# Patient Record
Sex: MALE | URBAN - METROPOLITAN AREA
[De-identification: names, ages, dates, MRNs, and addresses within clinical notes are randomized per-mention and may not be internally consistent; named-entity substitution may affect disease eponyms.]

---

## 2017-07-15 ENCOUNTER — NURSE TRIAGE (OUTPATIENT)
Dept: NURSING | Facility: CLINIC | Age: 42
End: 2017-07-15

## 2017-07-15 NOTE — TELEPHONE ENCOUNTER
"  Reason for Disposition    [1] SEVERE pain (e.g., excruciating) AND [2] present > 1 hour     \"I am calling from [facility]. I am here because I am having severe abdominal pain(in th middle and lower right), They took my vitals but said they are waiting for a room. I haven't left the  because I haven't had to go. But the pain is bad, I am wondering if I should go to [place] [facility]?\" Denies other sx. Patient vomited this am after water and grape fruit juice.  Is urinating normally and having normal bowel movements. I advised to speak with the nurse at [facility] regarding pain. Offered [place] [facility], but explained there may also be a wait time there as well.    Additional Information    Negative: Shock suspected (e.g., cold/pale/clammy skin, too weak to stand, low BP, rapid pulse)    Negative: Difficult to awaken or acting confused  (e.g., disoriented, slurred speech)    Negative: Passed out (i.e., lost consciousness, collapsed and was not responding)    Negative: Sounds like a life-threatening emergency to the triager    Negative: Chest pain    Negative: Pain is mainly in upper abdomen  (if needed ask: \"is it mainly above the belly button?\")    Negative: Followed an abdomen (stomach) injury    Protocols used: ABDOMINAL PAIN - MALE-ADULT-    "

## 2017-09-05 NOTE — PROGRESS NOTES
"  SUBJECTIVE:   Carl Sierra is a 42 year old male who presents to clinic today for the following health issues:    Patient presents with:  Sleep Problem: having trouble while sleeping such as: feelings like heart burn,heart racing    Restlessness:  Restlessness at night with racing heart beat (like ), been stressed a lot lately  Denies any chest pain but has a discomfort. Sometimes occurs after he eats.  The symptoms been going on for 2-3 weeks.    GERD:   Sometimes when eats feels like food gets stuck in the throat.  Denies any heartburn    Elevated BP:   Not had high BP before   Father  Has high BP with DM, Mom has diabetes   Father passed from heart attack at 64.    BMI:   Has lost a lot of weight from about 275 a year ago.    Stress:   Staying in [place] and is supposed to move by end and looking for a place and seems like time is running out..   Been very anxious also about his health.   No history of anxiety or depression.    Problem list and histories reviewed & adjusted, as indicated.  Additional history: as documented    There is no problem list on file for this patient.    History reviewed. No pertinent surgical history.    Social History   Substance Use Topics     Smoking status: Never Smoker     Smokeless tobacco: Never Used     Alcohol use Yes      Comment: OCC     Family History   Problem Relation Age of Onset     DIABETES Mother      DIABETES Father      HEART DISEASE Father          ROS:  Constitutional, HEENT, cardiovascular, pulmonary, gi and gu systems are negative, except as otherwise noted.    OBJECTIVE:   BP (!) 150/100  Pulse 81  Temp 97.5  F (36.4  C) (Oral)  Ht 5' 8.98\" (1.752 m)  Wt 257 lb (116.6 kg)  SpO2 99%  BMI 37.98 kg/m2  Body mass index is 37.98 kg/(m^2).  GENERAL: healthy, anxious appearing, alert and no distress  EYES: Eyes grossly normal to inspection, PERRL and conjunctivae and sclerae normal  NECK: no adenopathy and thyroid normal to palpation  RESP: lungs clear to auscultation " - no rales, rhonchi or wheezes  CV: regular rate and rhythm, no murmur, click or rub, no peripheral edema   ABDOMEN: soft, nontender, no masses and bowel sounds normal  MS: no gross musculoskeletal defects noted, no edema  NEURO: Normal strength and tone, mentation intact and speech normal  PSYCH: mentation appears normal, affect normal/bright    Diagnostic Test Results:        ASSESSMENT/PLAN:     (R45.1) Restlessness  (primary encounter diagnosis)  Comment: Consistent with anxiety. BP also slightly elevated but no cardiac symptoms.   Plan: CBC with platelets differential    (R03.0) Elevated blood pressure reading without diagnosis of hypertension  Comment: Likely high BP. BMP normal. Recheck in 1 week if elevated will start a BP owering pill  Plan: Comprehensive metabolic panel    (Z68.37) BMI 37.0-37.9, adult  Comment: Counseled to make better food choices, exercise as tolerated, and lose weight.   Plan: Lipid panel reflex to direct LDL    (F43.9) Stress  Comment: Anxiety about his health and housing arrangements. Discussed fact that current evaluation is reassuring and fact that can seek community options for accomodation if runs out of time, reaching out to friends and colleagues might alsobe an option    (Z83.3) Family history of diabetes mellitus  Comment: At risk.   Plan: Hemoglobin A1c    Follow up in 1 week or sooner with concerns    Bruce Quiros MD  [facility]

## 2017-09-06 ENCOUNTER — OFFICE VISIT (OUTPATIENT)
Dept: FAMILY MEDICINE | Facility: CLINIC | Age: 42
End: 2017-09-06
Payer: COMMERCIAL

## 2017-09-06 VITALS
WEIGHT: 257 LBS | HEIGHT: 69 IN | BODY MASS INDEX: 38.06 KG/M2 | SYSTOLIC BLOOD PRESSURE: 142 MMHG | TEMPERATURE: 97.5 F | OXYGEN SATURATION: 99 % | HEART RATE: 81 BPM | DIASTOLIC BLOOD PRESSURE: 90 MMHG

## 2017-09-06 DIAGNOSIS — R03.0 ELEVATED BLOOD PRESSURE READING WITHOUT DIAGNOSIS OF HYPERTENSION: ICD-10-CM

## 2017-09-06 DIAGNOSIS — Z23 NEED FOR PROPHYLACTIC VACCINATION WITH TETANUS-DIPHTHERIA (TD): ICD-10-CM

## 2017-09-06 DIAGNOSIS — R45.1 RESTLESSNESS: Primary | ICD-10-CM

## 2017-09-06 DIAGNOSIS — F43.9 STRESS: ICD-10-CM

## 2017-09-06 DIAGNOSIS — Z83.3 FAMILY HISTORY OF DIABETES MELLITUS: ICD-10-CM

## 2017-09-06 LAB
ALBUMIN SERPL-MCNC: 3.8 G/DL (ref 3.4–5)
ALP SERPL-CCNC: 78 U/L (ref 40–150)
ALT SERPL W P-5'-P-CCNC: 36 U/L (ref 0–70)
ANION GAP SERPL CALCULATED.3IONS-SCNC: 6 MMOL/L (ref 3–14)
AST SERPL W P-5'-P-CCNC: 22 U/L (ref 0–45)
BASOPHILS # BLD AUTO: 0 10E9/L (ref 0–0.2)
BASOPHILS NFR BLD AUTO: 0.9 %
BILIRUB SERPL-MCNC: 1.1 MG/DL (ref 0.2–1.3)
BUN SERPL-MCNC: 7 MG/DL (ref 7–30)
CALCIUM SERPL-MCNC: 9.1 MG/DL (ref 8.5–10.1)
CHLORIDE SERPL-SCNC: 102 MMOL/L (ref 94–109)
CHOLEST SERPL-MCNC: 153 MG/DL
CO2 SERPL-SCNC: 28 MMOL/L (ref 20–32)
CREAT SERPL-MCNC: 0.95 MG/DL (ref 0.66–1.25)
DIFFERENTIAL METHOD BLD: ABNORMAL
EOSINOPHIL # BLD AUTO: 0.1 10E9/L (ref 0–0.7)
EOSINOPHIL NFR BLD AUTO: 2.9 %
ERYTHROCYTE [DISTWIDTH] IN BLOOD BY AUTOMATED COUNT: 17.1 % (ref 10–15)
GFR SERPL CREATININE-BSD FRML MDRD: 87 ML/MIN/1.7M2
GLUCOSE SERPL-MCNC: 130 MG/DL (ref 70–99)
HBA1C MFR BLD: 8.3 % (ref 4.3–6)
HCT VFR BLD AUTO: 44.3 % (ref 40–53)
HDLC SERPL-MCNC: 47 MG/DL
HGB BLD-MCNC: 14.9 G/DL (ref 13.3–17.7)
LDLC SERPL CALC-MCNC: 82 MG/DL
LYMPHOCYTES # BLD AUTO: 1.9 10E9/L (ref 0.8–5.3)
LYMPHOCYTES NFR BLD AUTO: 42.4 %
MCH RBC QN AUTO: 27.1 PG (ref 26.5–33)
MCHC RBC AUTO-ENTMCNC: 33.6 G/DL (ref 31.5–36.5)
MCV RBC AUTO: 81 FL (ref 78–100)
MONOCYTES # BLD AUTO: 0.4 10E9/L (ref 0–1.3)
MONOCYTES NFR BLD AUTO: 10 %
NEUTROPHILS # BLD AUTO: 1.9 10E9/L (ref 1.6–8.3)
NEUTROPHILS NFR BLD AUTO: 43.8 %
NONHDLC SERPL-MCNC: 106 MG/DL
PLATELET # BLD AUTO: 199 10E9/L (ref 150–450)
POTASSIUM SERPL-SCNC: 4.1 MMOL/L (ref 3.4–5.3)
PROT SERPL-MCNC: 7.5 G/DL (ref 6.8–8.8)
RBC # BLD AUTO: 5.49 10E12/L (ref 4.4–5.9)
SODIUM SERPL-SCNC: 136 MMOL/L (ref 133–144)
TRIGL SERPL-MCNC: 118 MG/DL
WBC # BLD AUTO: 4.4 10E9/L (ref 4–11)

## 2017-09-06 PROCEDURE — 99203 OFFICE O/P NEW LOW 30 MIN: CPT | Performed by: FAMILY MEDICINE

## 2017-09-06 PROCEDURE — 85025 COMPLETE CBC W/AUTO DIFF WBC: CPT | Performed by: FAMILY MEDICINE

## 2017-09-06 PROCEDURE — 83036 HEMOGLOBIN GLYCOSYLATED A1C: CPT | Performed by: FAMILY MEDICINE

## 2017-09-06 PROCEDURE — 36415 COLL VENOUS BLD VENIPUNCTURE: CPT | Performed by: FAMILY MEDICINE

## 2017-09-06 PROCEDURE — 80053 COMPREHEN METABOLIC PANEL: CPT | Performed by: FAMILY MEDICINE

## 2017-09-06 PROCEDURE — 80061 LIPID PANEL: CPT | Performed by: FAMILY MEDICINE

## 2017-09-06 RX ORDER — FLUTICASONE PROPIONATE 50 MCG
1 SPRAY, SUSPENSION (ML) NASAL DAILY PRN
COMMUNITY

## 2017-09-06 NOTE — NURSING NOTE
"Chief Complaint   Patient presents with     Sleep Problem     having trouble while sleeping such as: feelings like heart burn,heart racing       Initial BP (!) 150/100  Pulse 81  Temp 97.5  F (36.4  C) (Oral)  Ht 5' 8.98\" (1.752 m)  Wt 257 lb (116.6 kg)  SpO2 99%  BMI 37.98 kg/m2 Estimated body mass index is 37.98 kg/(m^2) as calculated from the following:    Height as of this encounter: 5' 8.98\" (1.752 m).    Weight as of this encounter: 257 lb (116.6 kg).  Medication Reconciliation: complete  An TY eZlaya      "

## 2017-09-06 NOTE — LETTER
[facility]  [address]  [place] [address]    September 7, 2017    Carl Sierra  [address] [address]          Dear Pasquale Henry is a copy of your results. Discussed results with patient and will schedule appointment to discuss diabetes.   Results for orders placed or performed in visit on 09/06/17   Lipid panel reflex to direct LDL   Result Value Ref Range    Cholesterol 153 <200 mg/dL    Triglycerides 118 <150 mg/dL    HDL Cholesterol 47 >39 mg/dL    LDL Cholesterol Calculated 82 <100 mg/dL    Non HDL Cholesterol 106 <130 mg/dL   Hemoglobin A1c   Result Value Ref Range    Hemoglobin A1C 8.3 (H) 4.3 - 6.0 %   Comprehensive metabolic panel   Result Value Ref Range    Sodium 136 133 - 144 mmol/L    Potassium 4.1 3.4 - 5.3 mmol/L    Chloride 102 94 - 109 mmol/L    Carbon Dioxide 28 20 - 32 mmol/L    Anion Gap 6 3 - 14 mmol/L    Glucose 130 (H) 70 - 99 mg/dL    Urea Nitrogen 7 7 - 30 mg/dL    Creatinine 0.95 0.66 - 1.25 mg/dL    GFR Estimate 87 >60 mL/min/1.7m2    GFR Estimate If Black >90 >60 mL/min/1.7m2    Calcium 9.1 8.5 - 10.1 mg/dL    Bilirubin Total 1.1 0.2 - 1.3 mg/dL    Albumin 3.8 3.4 - 5.0 g/dL    Protein Total 7.5 6.8 - 8.8 g/dL    Alkaline Phosphatase 78 40 - 150 U/L    ALT 36 0 - 70 U/L    AST 22 0 - 45 U/L   CBC with platelets differential   Result Value Ref Range    WBC 4.4 4.0 - 11.0 10e9/L    RBC Count 5.49 4.4 - 5.9 10e12/L    Hemoglobin 14.9 13.3 - 17.7 g/dL    Hematocrit 44.3 40.0 - 53.0 %    MCV 81 78 - 100 fl    MCH 27.1 26.5 - 33.0 pg    MCHC 33.6 31.5 - 36.5 g/dL    RDW 17.1 (H) 10.0 - 15.0 %    Platelet Count 199 150 - 450 10e9/L    Diff Method Automated Method     % Neutrophils 43.8 %    % Lymphocytes 42.4 %    % Monocytes 10.0 %    % Eosinophils 2.9 %    % Basophils 0.9 %    Absolute Neutrophil 1.9 1.6 - 8.3 10e9/L    Absolute Lymphocytes 1.9 0.8 - 5.3 10e9/L    Absolute Monocytes 0.4 0.0 - 1.3  10e9/L    Absolute Eosinophils 0.1 0.0 - 0.7 10e9/L    Absolute Basophils 0.0 0.0 - 0.2 10e9/L       If you have any questions or concerns, please me or my clinic team at [phone].      Sincerely,        Bruce Quiros MD/bt

## 2017-09-06 NOTE — PATIENT INSTRUCTIONS
[facility]    If you have any questions regarding to your visit please contact your care team:       Team Purple:   Clinic Hours Telephone Number   Dr. Ruma Burns     7am-7pm  Monday - Thursday   7am-5pm  Fridays  [phone]  (Appointment scheduling available 24/7)    Questions about your Visit?   Team Line:  [phone]   Urgent Care - [place] and [facility] - 11am-9pm Monday-Friday Saturday-Sunday- 9am-5pm   [place] - 5pm-9pm Monday-Friday Saturday-Sunday- 9am-5pm  [phone] - [facility]  [phone] - [place]       What options do I have for visits at the clinic other than the traditional office visit?  To expand how we care for you, many of our providers are utilizing electronic visits (e-visits) and telephone visits, when medically appropriate, for interactions with their patients rather than a visit in the clinic.   We also offer nurse visits for many medical concerns. Just like any other service, we will bill your insurance company for this type of visit based on time spent on the phone with your provider. Not all insurance companies cover these visits. Please check with your medical insurance if this type of visit is covered. You will be responsible for any charges that are not paid by your insurance.      E-visits via [employer]:  generally incur a $35.00 fee.  Telephone visits:  Time spent on the phone: *charged based on time that is spent on the phone in increments of 10 minutes. Estimated cost:   5-10 mins $30.00   11-20 mins. $59.00   21-30 mins. $85.00     Use SunLinkt (secure email communication and access to your chart) to send your primary care provider a message or make an appointment. Ask someone on your Team how to sign up for [employer].  For a Price Quote for your services, please call our Consumer Price Line at [phone].  As always, Thank you for trusting us with your health care needs!    Discharged By: An

## 2018-12-08 NOTE — MR AVS SNAPSHOT
After Visit Summary   9/6/2017    Carl Sierra    MRN: [id number]           Patient Information     Date Of Birth          1975        Visit Information        Provider Department      9/6/2017 9:20 AM Bruce Quiros MD [facility]        Today's Diagnoses     Restlessness    -  1    Elevated blood pressure reading without diagnosis of hypertension        BMI 37.0-37.9, adult        Stress        Family history of diabetes mellitus        Need for prophylactic vaccination with tetanus-diphtheria (TD)          Care Instructions    [facility]    If you have any questions regarding to your visit please contact your care team:       Team Purple:   Clinic Hours Telephone Number   Dr. Ruma Burns     7am-7pm  Monday - Thursday   7am-5pm  Fridays  [phone]  (Appointment scheduling available 24/7)    Questions about your Visit?   Team Line:  [phone]   [facility] [place] [facility] - 11am-9pm Monday-Friday Saturday-Sunday- 9am-5pm   [place] - 5pm-9pm Monday-Friday Saturday-Sunday- 9am-5pm  [phone] - [place]   [phone] - [place]       What options do I have for visits at the clinic other than the traditional office visit?  To expand how we care for you, many of our providers are utilizing electronic visits (e-visits) and telephone visits, when medically appropriate, for interactions with their patients rather than a visit in the clinic.   We also offer nurse visits for many medical concerns. Just like any other service, we will bill your insurance company for this type of visit based on time spent on the phone with your provider. Not all insurance companies cover these visits. Please check with your medical insurance if this type of visit is covered. You will be responsible for any charges that are not paid by your insurance.      E-visits via [employer]:  generally incur a $35.00  "fee.  Telephone visits:  Time spent on the phone: *charged based on time that is spent on the phone in increments of 10 minutes. Estimated cost:   5-10 mins $30.00   11-20 mins. $59.00   21-30 mins. $85.00     Use [employer] AShart (secure email communication and access to your chart) to send your primary care provider a message or make an appointment. Ask someone on your Team how to sign up for [employer]elli[employer].  For a Price Quote for your services, please call our [employer] Line at [phone].  As always, Thank you for trusting us with your health care needs!    Discharged By: An            Follow-ups after your visit        Who to contact     If you have questions or need follow up information about today's clinic visit or your schedule please contact [facility] directly at [phone].  Normal or non-critical lab and imaging results will be communicated to you by MyChart, letter or phone within 4 business days after the clinic has received the results. If you do not hear from us within 7 days, please contact the clinic through [employer] or phone. If you have a critical or abnormal lab result, we will notify you by phone as soon as possible.  Submit refill requests through [employer] or call your pharmacy and they will forward the refill request to us. Please allow 3 business days for your refill to be completed.          Additional Information About Your Visit        Sekal AShart Information     IntelliFlot lets you send messages to your doctor, view your test results, renew your prescriptions, schedule appointments and more. To sign up, go to [email or web address] . Click on \"Log in\" on the left side of the screen, which will take you to the Welcome page. Then click on \"Sign up Now\" on the right side of the page.     You will be asked to enter the access code listed below, as well as some personal information. Please follow the directions to create your username and password.     Your access code is: " "[email or web address]  Expires: 2017 10:25 AM     Your access code will  in 90 days. If you need help or a new code, please call your [place] clinic or [phone].        Care EveryWhere ID     This is your Care EveryWhere ID. This could be used by other organizations to access your [place] medical records  [id number]        Your Vitals Were     Pulse Temperature Height Pulse Oximetry BMI (Body Mass Index)       81 97.5  F (36.4  C) (Oral) 5' 8.98\" (1.752 m) 99% 37.98 kg/m2        Blood Pressure from Last 3 Encounters:   17 (!) 150/100    Weight from Last 3 Encounters:   17 257 lb (116.6 kg)              We Performed the Following     CBC with platelets differential     Comprehensive metabolic panel     Hemoglobin A1c     Lipid panel reflex to direct LDL        Primary Care Provider    None Specified       No primary provider on file.        Equal Access to Services     SHANIQUE YEH : Hadii nydia Pope, waaxda luarnulfo, qaybta kaalmada adestephany, sue lau . So [facility] [phone].    ATENCIÓN: Si habla español, tiene a dangelo disposición servicios gratuitos de asistencia lingüística. Nicole al [phone].    We comply with applicable federal civil rights laws and [place] laws. We do not discriminate on the basis of race, color, national origin, age, disability sex, sexual orientation or gender identity.            Thank you!     Thank you for choosing [facility]  for your care. Our goal is always to provide you with excellent care. Hearing back from our patients is one way we can continue to improve our services. Please take a few minutes to complete the written survey that you may receive in the mail after your visit with us. Thank you!             Your Updated Medication List - Protect others around you: Learn how to safely use, store and throw away your medicines at www.disposemymeds.org.          This list is accurate as of: 17 10:25 " AM.  Always use your most recent med list.                   Brand Name Dispense Instructions for use Diagnosis    fluticasone 50 MCG/ACT spray    FLONASE     Spray 1 spray into both nostrils daily as needed for rhinitis or allergies           negative

## 2021-04-08 ENCOUNTER — IMMUNIZATION (OUTPATIENT)
Dept: FAMILY MEDICINE | Facility: CLINIC | Age: 46
End: 2021-04-08
Payer: COMMERCIAL

## 2021-04-08 PROCEDURE — 0011A PR COVID VAC MODERNA 100 MCG/0.5 ML IM: CPT

## 2021-04-08 PROCEDURE — 91301 PR COVID VAC MODERNA 100 MCG/0.5 ML IM: CPT

## 2021-05-06 ENCOUNTER — IMMUNIZATION (OUTPATIENT)
Dept: FAMILY MEDICINE | Facility: CLINIC | Age: 46
End: 2021-05-06
Attending: FAMILY MEDICINE
Payer: COMMERCIAL

## 2021-05-06 PROCEDURE — 0012A PR COVID VAC MODERNA 100 MCG/0.5 ML IM: CPT

## 2021-05-06 PROCEDURE — 91301 PR COVID VAC MODERNA 100 MCG/0.5 ML IM: CPT

## 2024-11-04 ENCOUNTER — HOSPITAL ENCOUNTER (INPATIENT)
Facility: CLINIC | Age: 49
DRG: 057 | End: 2024-11-04
Attending: PHYSICAL MEDICINE & REHABILITATION | Admitting: PHYSICAL MEDICINE & REHABILITATION
Payer: COMMERCIAL

## 2024-11-04 DIAGNOSIS — I63.511 ACUTE ISCHEMIC RIGHT MCA STROKE (H): Primary | ICD-10-CM

## 2024-11-04 LAB
GLUCOSE BLDC GLUCOMTR-MCNC: 124 MG/DL (ref 70–99)
GLUCOSE BLDC GLUCOMTR-MCNC: 128 MG/DL (ref 70–99)

## 2024-11-04 PROCEDURE — 250N000012 HC RX MED GY IP 250 OP 636 PS 637

## 2024-11-04 PROCEDURE — 128N000003 HC R&B REHAB

## 2024-11-04 PROCEDURE — 99222 1ST HOSP IP/OBS MODERATE 55: CPT | Mod: AI | Performed by: PHYSICAL MEDICINE & REHABILITATION

## 2024-11-04 PROCEDURE — 250N000013 HC RX MED GY IP 250 OP 250 PS 637

## 2024-11-04 PROCEDURE — 250N000013 HC RX MED GY IP 250 OP 250 PS 637: Performed by: PHYSICAL MEDICINE & REHABILITATION

## 2024-11-04 RX ORDER — AMLODIPINE BESYLATE 10 MG/1
10 TABLET ORAL DAILY
Status: DISCONTINUED | OUTPATIENT
Start: 2024-11-05 | End: 2024-11-22 | Stop reason: HOSPADM

## 2024-11-04 RX ORDER — LIDOCAINE 4 G/G
1 PATCH TOPICAL
Status: DISCONTINUED | OUTPATIENT
Start: 2024-11-04 | End: 2024-11-04

## 2024-11-04 RX ORDER — NALOXONE HYDROCHLORIDE 0.4 MG/ML
0.4 INJECTION, SOLUTION INTRAMUSCULAR; INTRAVENOUS; SUBCUTANEOUS
Status: DISCONTINUED | OUTPATIENT
Start: 2024-11-04 | End: 2024-11-04

## 2024-11-04 RX ORDER — ASPIRIN 81 MG/1
81 TABLET, CHEWABLE ORAL DAILY
Status: DISCONTINUED | OUTPATIENT
Start: 2024-11-05 | End: 2024-11-22 | Stop reason: HOSPADM

## 2024-11-04 RX ORDER — FAMOTIDINE 20 MG/1
20 TABLET, FILM COATED ORAL 2 TIMES DAILY
Status: DISCONTINUED | OUTPATIENT
Start: 2024-11-04 | End: 2024-11-11

## 2024-11-04 RX ORDER — POLYETHYLENE GLYCOL 3350 17 G/17G
17 POWDER, FOR SOLUTION ORAL DAILY
Status: DISCONTINUED | OUTPATIENT
Start: 2024-11-04 | End: 2024-11-15

## 2024-11-04 RX ORDER — LOSARTAN POTASSIUM 50 MG/1
50 TABLET ORAL 2 TIMES DAILY
Status: DISCONTINUED | OUTPATIENT
Start: 2024-11-04 | End: 2024-11-22 | Stop reason: HOSPADM

## 2024-11-04 RX ORDER — BISACODYL 10 MG
10 SUPPOSITORY, RECTAL RECTAL DAILY PRN
Status: DISCONTINUED | OUTPATIENT
Start: 2024-11-04 | End: 2024-11-06

## 2024-11-04 RX ORDER — DEXTROSE MONOHYDRATE 25 G/50ML
25-50 INJECTION, SOLUTION INTRAVENOUS
Status: DISCONTINUED | OUTPATIENT
Start: 2024-11-04 | End: 2024-11-22 | Stop reason: HOSPADM

## 2024-11-04 RX ORDER — NICOTINE POLACRILEX 4 MG
15-30 LOZENGE BUCCAL
Status: DISCONTINUED | OUTPATIENT
Start: 2024-11-04 | End: 2024-11-22 | Stop reason: HOSPADM

## 2024-11-04 RX ORDER — ATORVASTATIN CALCIUM 40 MG/1
40 TABLET, FILM COATED ORAL EVERY EVENING
Status: DISCONTINUED | OUTPATIENT
Start: 2024-11-04 | End: 2024-11-22 | Stop reason: HOSPADM

## 2024-11-04 RX ORDER — METOPROLOL TARTRATE 50 MG
50 TABLET ORAL 2 TIMES DAILY
Status: DISCONTINUED | OUTPATIENT
Start: 2024-11-04 | End: 2024-11-22 | Stop reason: HOSPADM

## 2024-11-04 RX ORDER — NALOXONE HYDROCHLORIDE 0.4 MG/ML
0.2 INJECTION, SOLUTION INTRAMUSCULAR; INTRAVENOUS; SUBCUTANEOUS
Status: DISCONTINUED | OUTPATIENT
Start: 2024-11-04 | End: 2024-11-04

## 2024-11-04 RX ORDER — OXYCODONE HYDROCHLORIDE 5 MG/1
5 TABLET ORAL EVERY 6 HOURS PRN
Status: DISCONTINUED | OUTPATIENT
Start: 2024-11-04 | End: 2024-11-04

## 2024-11-04 RX ORDER — ACETAMINOPHEN 325 MG/1
650 TABLET ORAL EVERY 4 HOURS PRN
Status: DISCONTINUED | OUTPATIENT
Start: 2024-11-04 | End: 2024-11-22 | Stop reason: HOSPADM

## 2024-11-04 RX ADMIN — ACETAMINOPHEN 650 MG: 325 TABLET, FILM COATED ORAL at 17:46

## 2024-11-04 RX ADMIN — METOPROLOL TARTRATE 50 MG: 50 TABLET, FILM COATED ORAL at 20:29

## 2024-11-04 RX ADMIN — FAMOTIDINE 20 MG: 20 TABLET ORAL at 20:30

## 2024-11-04 RX ADMIN — INSULIN GLARGINE 7 UNITS: 100 INJECTION, SOLUTION SUBCUTANEOUS at 21:21

## 2024-11-04 RX ADMIN — Medication 3 MG: at 20:30

## 2024-11-04 RX ADMIN — POLYETHYLENE GLYCOL 3350 17 G: 17 POWDER, FOR SOLUTION ORAL at 17:40

## 2024-11-04 RX ADMIN — ATORVASTATIN CALCIUM 40 MG: 40 TABLET, FILM COATED ORAL at 20:30

## 2024-11-04 RX ADMIN — LOSARTAN POTASSIUM 50 MG: 50 TABLET, FILM COATED ORAL at 20:30

## 2024-11-04 RX ADMIN — ACETAMINOPHEN 650 MG: 325 TABLET, FILM COATED ORAL at 21:20

## 2024-11-04 ASSESSMENT — ACTIVITIES OF DAILY LIVING (ADL)
ADLS_ACUITY_SCORE: 0

## 2024-11-04 NOTE — H&P
[facility]   Acute Rehabilitation Unit  Admission History and Physical    CHIEF COMPLAINT   stroke     HISTORY OF PRESENT ILLNESS  Carl Sierra is a 49 year old left hand dominant male with past medical history of untreated/undiagnosed obstructive sleep apnea and hypertension who was initially admitted on October 2nd with hypertensive emergency, left upper extremity numbness and weakness.  He was found to have right MCA ischemic stroke and NORMA.  He received TNK on October 2 with no significant improvement of his symptoms.  Brain MRI also showed small right posterior cerebellar stroke and multiple old infarcts.    His hospital course was complicated by progression of his symptoms due to worsening cerebral edema and midline shift requiring right decompressive craniectomy on October 5.  He was also diagnosed by DM II, HLD, nonischemic cardiomyopathy and heart failure with reserved ejection fraction, oral candidiasis, and strep group B and H. Influenza infection (received 5 days course of amoxicillin 10/11-16)  and depression.    Repeat head CT on 10/29 showed revolving infarction, unchanged degree of edema and herniation and some petechial hemorrhage which was also grossly unchanged.     Unfortunately his left-sided weakness has been persistent. Staples were removed from his surgical incision site and he has to use helmet when he is out of bed.  He has had some episodes of waxing and waning of his mental status and headaches.  It seems like his mental status and headaches have both improved with Tylenol and other medical management.  He required NG tube placement and tube feeding early after his surgery but that was removed and he has been able to eat well.  He had hyponatremia which resolved.  Neurosurgery team is planning for a cranioplasty in about 4 to 6 weeks.  Cardiology team also recommended repeat echocardiogram in a month and follow-up in clinic.    During acute  hospitalization, patient was seen and evaluated by PT, OT, and SLP who collectively recommended that patient would benefit from ongoing therapies in the acute inpatient rehabilitation setting, and patient was admitted on 11/4/2024.     Per therapy notes, he has been able to follow one-step commands.  He requires max assist of 2 and lift for transfers and also A of 2 for all mobility and ADLs.  He is on a regular diet with thin liquids but needs assistance with eating.  He has been constipated and receiving bowel meds.  They have been using primofit all day and all night.  He has left-sided inattention, impaired cognition and language which needs further evaluation during his rehab stay.    Upon arrival to the rehab unit, patient was seen at the bedside.  His partner Renea and mother Mya were both available and very supportive.  He reported intermittent headaches, some left knee pain and some tightness and muscle spasms in his neck area and right trap.  He feels the urge for voiding but has been using primofit as above.  He also noted that he has been constipated and his last bowel movement was about 2 days ago.  He also reported left-sided weakness and some numbness.  Appetite has been low. Family and really both confirmed changes in his cognition and language.    PAST MEDICAL HISTORY  Hypertension  Hyperlipidemia  GLENN  Diabetes mellitus.       SOCIAL HISTORY  He lives in a townhouse with his partner Renea.  They have a very small step (threshold) to enter and about 16 to go up stairs where bedrooms and shower are located.  They have a bathroom on the main floor but no shower.    He works full-time as an  at the [employer].    Per chart review: Carl Sierra  reports that he has never smoked. He has never used smokeless tobacco. He reports current alcohol use. He reports that he does not use drugs.    PRIOR FUNCTIONAL HISTORY   Pt was independent with all ADLs/IADLs, transfers, mobility  and gait with no assistive device.    Left hand dominant  Drives      Social History     Socioeconomic History    Marital status: Single     Spouse name: Not on file    Number of children: Not on file    Years of education: Not on file    Highest education level: Not on file   Occupational History    Not on file   Tobacco Use    Smoking status: Never    Smokeless tobacco: Never   Substance and Sexual Activity    Alcohol use: Yes     Comment: OCC    Drug use: No    Sexual activity: Not Currently   Other Topics Concern    Parent/sibling w/ CABG, MI or angioplasty before 65F 55M? Not Asked   Social History Narrative    Not on file     Social Drivers of Health     Financial Resource Strain: Low Risk  (11/4/2024)    Financial Resource Strain     Within the past 12 months, have you or your family members you live with been unable to get utilities (heat, electricity) when it was really needed?: No   Food Insecurity: Low Risk  (11/4/2024)    Food Insecurity     Within the past 12 months, did you worry that your food would run out before you got money to buy more?: No     Within the past 12 months, did the food you bought just not last and you didn t have money to get more?: No   Transportation Needs: Low Risk  (11/4/2024)    Transportation Needs     Within the past 12 months, has lack of transportation kept you from medical appointments, getting your medicines, non-medical meetings or appointments, work, or from getting things that you need?: No   Physical Activity: Not on file   Stress: Not on file   Social Connections: Not on file   Interpersonal Safety: Low Risk  (11/4/2024)    Interpersonal Safety     Do you feel physically and emotionally safe where you currently live?: Yes     Within the past 12 months, have you been hit, slapped, kicked or otherwise physically hurt by someone?: No     Within the past 12 months, have you been humiliated or emotionally abused in other ways by your partner or ex-partner?: No   Housing  "Stability: Low Risk  (11/4/2024)    Housing Stability     Do you have housing? : Yes     Are you worried about losing your housing?: No         FAMILY HISTORY  Reviewed and updated in Epic.  Family History   Problem Relation Age of Onset    Diabetes Mother     Diabetes Father     Heart Disease Father        MEDICATIONS  Scheduled meds  Medications Prior to Admission   Medication Sig Dispense Refill Last Dose/Taking    fluticasone (FLONASE) 50 MCG/ACT spray Spray 1 spray into both nostrils daily as needed for rhinitis or allergies   Unknown       ALLERGIES   No Known Allergies    REVIEW OF SYSTEMS  A 10 point ROS was performed and negative unless otherwise noted in HPI.     PHYSICAL EXAM  VITAL SIGNS:  BP (!) 140/90 (BP Location: Left arm)   Pulse 79   Temp 98  F (36.7  C) (Oral)   Resp 20   Ht 1.753 m (5' 9\")   Wt 102 kg (224 lb 13.9 oz)   SpO2 100%   BMI 33.21 kg/m    BMI:  Estimated body mass index is 33.21 kg/m  as calculated from the following:    Height as of this encounter: 1.753 m (5' 9\").    Weight as of this encounter: 102 kg (224 lb 13.9 oz).     Gen: No acute distress, cooperative.  HEENT: Right cranial incision healing well.  Noted the swelling/asymmetry of his skull on the right side.  Moist mucous membrane  CV: Regular rate, rhythm, no murmurs.  Respiratory: CTAB, no wheezing, crackles or rhonchi. Breathing comfortably on room air.  Abd: Bowel sounds present. Soft, non-distended, non-tender to palpation in four quadrants.  Extremities: Calves warm, soft, non-tender bilaterally.    Neuromuscular: Speech fluent & comprehensible.    Cranial Nerves: Left facial droop, left weak shoulder shrug, he could not follow the instructions for extraocular movement testing.  He also noted to have some nystagmus when looking to the left.  It was not clear if he had left homonymous hemianopsia or just an attention to the left side.   Sensory: Diminished to light touch at left upper and left lower " extremity   Motor: Intact at right upper and right lower extremity.  No volitional movement at left upper or left lower extremity but he had some activation of his pec and proximal shoulder muscles when positioned antigravity.  He also had some resistance with synergistic movement of muscles in his left lower extremity and closed kinetic chain pattern   Harkins's test: negative bilaterally    Babinski reflex: normal downgoing bilaterally    Tone per Modified Cindy Scale: He was mostly flaccid at left upper and lower extremity but noted a slight increase at the end of the range for elbow flexor and knee flexor muscles 1 out of 4 per modified Cindy score.  He also had a few beats of clonus at left wrist and left ankle      LABS  Reviewed in Care Everywhere    IMPRESSION/PLAN:  Carl Sierra is a 49 year old left hand dominant male with past medical history of HTN and undiagnosed GLENN, who initially presented to October 2 with hypertensive emergency, left upper extremity weakness and numbness due to right MCA ischemic stroke. He underwent right craniectomy on October 5 for the management of cerebral edema and midline shift. Hospital course was complicated but he clinically improved and was transferred to ARU.     Admission to acute inpatient rehab: 11/4/2024   Impairment group code: 01.1 Stroke     PT, OT and SLP 60 minutes of each on a daily basis, 6x a week, for 25 days,  in addition to rehab nursing and close management of physiatrist.     Impairment of ADL's:  OT for 60 min daily, 6x a week for 25 days, to work on upper and lower body self care, dressing, toileting, bathing, energy conservation techniques with use of ADs as needed   Impairment of mobility:   PT for 60 min daily, 6x a week for 25 days, to work on strengthening, endurance buildup, transfers and most likely WC mobility.   Impairment of cognition/language/swallow:   SLP for 60 min daily, 6x a week  for cognitive evaluation and treatment strategies  for higher level cognitive deficits and memory impairment   Rehab RN to administer medication, patient education on medication taking, VS monitoring, bowel regimen, glucose monitoring and wound care/surgical wound dressing changes and monitoring.     Medical Conditions  # Right MCA ischemic stroke s/p TNK  # Cerebral edema s/p decompressive craniectomy 10/5  Brain MRI also showed  1. Relatively large acute infarct in the right middle cerebral artery territory involving the insular cortex as well as portions of the frontal and parietal cortex including the pre and postcentral gyri. Additional smaller acute infarcts are also present along the cortex elsewhere in the right middle cerebral artery territory. A few small acute infarcts are present in the posterior medial right cerebellum and a few punctate acute infarcts are present in the left anterior cerebral artery territory. There is no hemorrhage or mass effect.   2. Chronic infarcts in the left brachium pontis and left cerebral peduncle.   CT angio showed  Short segment severe stenosis of the posterior branch of the M2 segment of the right MCA.   1 cm long severe irregular stenosis of the A3 segment of the right anterior cerebral artery.   CT head was done on 10/18, which was concerning for hemorrhagic petechiae in right MCA territory with evolution of stroke. Repeat CT head ordered on 10/19, unchanged compared to previous imaging.     Completed 7 day course of [facility]   Discharging team and neurosurgery  recommended low dose caffeine or modafinil to help with alertness if needed    - continue ASA and plavix  - consider modafinil   - continue acetaminophen-caffeine (Excedrin Tension Headache) 500-65 mg tablet daily (was started on 10/25)    - stroke secondary prevention as below  - surgical wound healing well; staples were removed 10/21  - helmet when OOB  - follow up with neurosurgery for bone flap replacement - [facility]  (Would be direct admission  day prior for lumbar drain placement and bone flap replacement the following day.)  - follow up stroke neurology and PM&R as outpatient        # HTN  Not managed prior to admission    - continue metoprolol 50 bid   - continue losartan 50 bid (was dosed bid for better control of morning elevation in BP and can be changed to daily dosing)  - amlodipine 10 daily       # HLD  LDL 98 on 10/3.   - continue lipitor 40  - repeat lipid panel in 3 months       # DM II  HgbA1c was 9.2 10/3  - DM education   - continue lantus 7 units bid   - continue low dose sliding scale insulin   - BG check QID  - discontinued 2am check for now   - will add an oral agent in 1-2 days and adjust insulin dos accordingly   - consider endo consult pending his course   - repeat HgbA1C in 3 months       # NICM/HFrEF  EF 40 to 45% with global hypokinesis but no intracardiac shunt   - repeat TTE and follow up with cardiology in a month       # Probable undiagnosed GLENN   Family reported episodes of apnea during the night.   - continue prn O2  - formal sleep study as outpatient        # Hyponatremia   Resolved per discharging team; it was 135 and slightly low on 11/4  Will continue to monitor       # Anemia   Mild and stable, likely due to chronic disease vs CHAD.   Will continue to monitor       # Headaches  # Neck pain   # L knee pain   Headaches seem to be well controlled on tylenol. Typically starts towards the end of the day and also responded to lying flat and hydration.   Was using lidocaine patch and bengay for neck pain as well as prn norco. Pain seems myofascial in nature with no clinical s/s of radiculopathy.   L knee pain for 20 years due to sports injury and likely early OA.   - continue tylenol  - was also on norco 0.5-1 tablet q4h prn since 10/29; was discontinued at discharge.   - reported no benefits from lidocaine so it was discontinued  - added topical bengay cream   - can consider TENS for the myofascial pain       # Insomnia    Seroquel made him agitated and was not beneficial so it was discontinued at discharge and per sign out from discharging team (last dose 11/2)  - continue melatonin   - consider ramelteon       # Obesity  BMI 33.86  - nutrition consult       # Depression  # Adjustment to disability  Worsening mood in the setting of his stroke and disability.   - rehab psych consult   - spiritual health consult   - can consider selective serotonin reuptake inhibitor vs SNRI       # Neurogenic bowel  - PRN and scheduled bowel meds available  - consider starting bowel program with scheduled supp      # Neurogenic bladder:   - ok to use primofit  - will check PVRs  - will start using urinal during the day and possibly toileting using the commode pending his course       FEN: regular with thin. Continued B1 supplement.   DVT Prophylaxis: on dual antiplatelet but no chemical DVT prophylaxis. Will discuss with patient consider adding lovenox (per neurosurgery team note on 10/30 - Okay for daily lovenox if needed). Low threshold for checking US pending his clinical course   GI Prophylaxis: none; added protonix upon admission. On pepcid bid which can discontinued pending his symptoms.    Code: Full - confirmed upon admission   Disposition: he will WC based and will require assistance with all ADLs and mobility; home is not accessible. Will need as ramp and possibly chair lift for stairs.   ELOS/Discharge Date:  4 weeks and most likely TCU after that before discharge to home.  Rehab prognosis:  Good   Follow up Appointments on Discharge:   Outpatient PCP in 1-2 weeks.   Outpatient PM&R in 1-2 months.  Stroke neurology   Neurosurgery    Cardiology   Sleep medicine   Psychology         Yessica Villalobos MD  Physical Medicine & Rehabilitation

## 2024-11-05 ENCOUNTER — APPOINTMENT (OUTPATIENT)
Dept: PHYSICAL THERAPY | Facility: CLINIC | Age: 49
DRG: 057 | End: 2024-11-05
Attending: PHYSICAL MEDICINE & REHABILITATION
Payer: COMMERCIAL

## 2024-11-05 ENCOUNTER — APPOINTMENT (OUTPATIENT)
Dept: OCCUPATIONAL THERAPY | Facility: CLINIC | Age: 49
DRG: 057 | End: 2024-11-05
Payer: COMMERCIAL

## 2024-11-05 ENCOUNTER — APPOINTMENT (OUTPATIENT)
Dept: SPEECH THERAPY | Facility: CLINIC | Age: 49
DRG: 057 | End: 2024-11-05
Attending: PHYSICAL MEDICINE & REHABILITATION
Payer: COMMERCIAL

## 2024-11-05 LAB
ANION GAP SERPL CALCULATED.3IONS-SCNC: 9 MMOL/L (ref 7–15)
BUN SERPL-MCNC: 5.4 MG/DL (ref 6–20)
CALCIUM SERPL-MCNC: 9.3 MG/DL (ref 8.8–10.4)
CHLORIDE SERPL-SCNC: 102 MMOL/L (ref 98–107)
CREAT SERPL-MCNC: 0.69 MG/DL (ref 0.67–1.17)
EGFRCR SERPLBLD CKD-EPI 2021: >90 ML/MIN/1.73M2
ERYTHROCYTE [DISTWIDTH] IN BLOOD BY AUTOMATED COUNT: 15.6 % (ref 10–15)
GLUCOSE BLDC GLUCOMTR-MCNC: 104 MG/DL (ref 70–99)
GLUCOSE BLDC GLUCOMTR-MCNC: 105 MG/DL (ref 70–99)
GLUCOSE BLDC GLUCOMTR-MCNC: 120 MG/DL (ref 70–99)
GLUCOSE BLDC GLUCOMTR-MCNC: 99 MG/DL (ref 70–99)
GLUCOSE SERPL-MCNC: 102 MG/DL (ref 70–99)
HCO3 SERPL-SCNC: 24 MMOL/L (ref 22–29)
HCT VFR BLD AUTO: 34.8 % (ref 40–53)
HGB BLD-MCNC: 11.2 G/DL (ref 13.3–17.7)
MCH RBC QN AUTO: 25.6 PG (ref 26.5–33)
MCHC RBC AUTO-ENTMCNC: 32.2 G/DL (ref 31.5–36.5)
MCV RBC AUTO: 80 FL (ref 78–100)
PLATELET # BLD AUTO: 261 10E3/UL (ref 150–450)
POTASSIUM SERPL-SCNC: 3.9 MMOL/L (ref 3.4–5.3)
RBC # BLD AUTO: 4.38 10E6/UL (ref 4.4–5.9)
SODIUM SERPL-SCNC: 135 MMOL/L (ref 135–145)
WBC # BLD AUTO: 4.8 10E3/UL (ref 4–11)

## 2024-11-05 PROCEDURE — 97530 THERAPEUTIC ACTIVITIES: CPT | Mod: GP

## 2024-11-05 PROCEDURE — 97163 PT EVAL HIGH COMPLEX 45 MIN: CPT | Mod: GP

## 2024-11-05 PROCEDURE — 92523 SPEECH SOUND LANG COMPREHEN: CPT | Mod: GN | Performed by: SPEECH-LANGUAGE PATHOLOGIST

## 2024-11-05 PROCEDURE — 80048 BASIC METABOLIC PNL TOTAL CA: CPT

## 2024-11-05 PROCEDURE — 99232 SBSQ HOSP IP/OBS MODERATE 35: CPT | Mod: GC | Performed by: PHYSICAL MEDICINE & REHABILITATION

## 2024-11-05 PROCEDURE — 250N000011 HC RX IP 250 OP 636

## 2024-11-05 PROCEDURE — 250N000013 HC RX MED GY IP 250 OP 250 PS 637

## 2024-11-05 PROCEDURE — 128N000003 HC R&B REHAB

## 2024-11-05 PROCEDURE — 85014 HEMATOCRIT: CPT | Performed by: PHYSICAL MEDICINE & REHABILITATION

## 2024-11-05 PROCEDURE — 97167 OT EVAL HIGH COMPLEX 60 MIN: CPT | Mod: GO | Performed by: OCCUPATIONAL THERAPIST

## 2024-11-05 PROCEDURE — 36415 COLL VENOUS BLD VENIPUNCTURE: CPT

## 2024-11-05 PROCEDURE — 97535 SELF CARE MNGMENT TRAINING: CPT | Mod: GO | Performed by: OCCUPATIONAL THERAPIST

## 2024-11-05 PROCEDURE — 250N000013 HC RX MED GY IP 250 OP 250 PS 637: Performed by: PHYSICAL MEDICINE & REHABILITATION

## 2024-11-05 PROCEDURE — 85048 AUTOMATED LEUKOCYTE COUNT: CPT | Performed by: PHYSICAL MEDICINE & REHABILITATION

## 2024-11-05 RX ORDER — HYDROCODONE BITARTRATE AND ACETAMINOPHEN 5; 325 MG/1; MG/1
1 TABLET ORAL
Status: COMPLETED | OUTPATIENT
Start: 2024-11-05 | End: 2024-11-06

## 2024-11-05 RX ORDER — SENNOSIDES 8.6 MG
2 TABLET ORAL DAILY
Status: DISCONTINUED | OUTPATIENT
Start: 2024-11-06 | End: 2024-11-15

## 2024-11-05 RX ORDER — PANTOPRAZOLE SODIUM 40 MG/1
40 TABLET, DELAYED RELEASE ORAL
Status: DISCONTINUED | OUTPATIENT
Start: 2024-11-05 | End: 2024-11-22 | Stop reason: HOSPADM

## 2024-11-05 RX ORDER — CLOPIDOGREL BISULFATE 75 MG/1
75 TABLET ORAL DAILY
Status: DISCONTINUED | OUTPATIENT
Start: 2024-11-05 | End: 2024-11-22 | Stop reason: HOSPADM

## 2024-11-05 RX ORDER — ENOXAPARIN SODIUM 100 MG/ML
40 INJECTION SUBCUTANEOUS EVERY 24 HOURS
Status: DISCONTINUED | OUTPATIENT
Start: 2024-11-05 | End: 2024-11-22 | Stop reason: HOSPADM

## 2024-11-05 RX ORDER — SENNOSIDES 8.6 MG
2 TABLET ORAL DAILY
Status: DISCONTINUED | OUTPATIENT
Start: 2024-11-05 | End: 2024-11-05

## 2024-11-05 RX ADMIN — ENOXAPARIN SODIUM 40 MG: 40 INJECTION SUBCUTANEOUS at 14:03

## 2024-11-05 RX ADMIN — ASPIRIN 81 MG CHEWABLE TABLET 81 MG: 81 TABLET CHEWABLE at 08:19

## 2024-11-05 RX ADMIN — METOPROLOL TARTRATE 50 MG: 50 TABLET, FILM COATED ORAL at 20:16

## 2024-11-05 RX ADMIN — Medication: at 09:58

## 2024-11-05 RX ADMIN — INSULIN GLARGINE 7 UNITS: 100 INJECTION, SOLUTION SUBCUTANEOUS at 21:49

## 2024-11-05 RX ADMIN — INSULIN GLARGINE 7 UNITS: 100 INJECTION, SOLUTION SUBCUTANEOUS at 08:27

## 2024-11-05 RX ADMIN — ACETAMINOPHEN 650 MG: 325 TABLET, FILM COATED ORAL at 09:58

## 2024-11-05 RX ADMIN — ATORVASTATIN CALCIUM 40 MG: 40 TABLET, FILM COATED ORAL at 20:16

## 2024-11-05 RX ADMIN — POLYETHYLENE GLYCOL 3350 17 G: 17 POWDER, FOR SOLUTION ORAL at 19:35

## 2024-11-05 RX ADMIN — Medication 3 MG: at 19:34

## 2024-11-05 RX ADMIN — PANTOPRAZOLE SODIUM 40 MG: 40 TABLET, DELAYED RELEASE ORAL at 08:20

## 2024-11-05 RX ADMIN — FAMOTIDINE 20 MG: 20 TABLET ORAL at 20:16

## 2024-11-05 RX ADMIN — Medication: at 15:36

## 2024-11-05 RX ADMIN — Medication: at 02:27

## 2024-11-05 RX ADMIN — CLOPIDOGREL BISULFATE 75 MG: 75 TABLET ORAL at 10:44

## 2024-11-05 RX ADMIN — THIAMINE HCL TAB 100 MG 100 MG: 100 TAB at 08:20

## 2024-11-05 RX ADMIN — SENNOSIDES 2 TABLET: 8.6 TABLET, FILM COATED ORAL at 14:03

## 2024-11-05 RX ADMIN — LOSARTAN POTASSIUM 50 MG: 50 TABLET, FILM COATED ORAL at 08:20

## 2024-11-05 RX ADMIN — AMLODIPINE BESYLATE 10 MG: 10 TABLET ORAL at 08:20

## 2024-11-05 RX ADMIN — Medication: at 20:03

## 2024-11-05 RX ADMIN — ACETAMINOPHEN 650 MG: 325 TABLET, FILM COATED ORAL at 14:03

## 2024-11-05 RX ADMIN — Medication 6.25 MG: at 02:33

## 2024-11-05 RX ADMIN — ACETAMINOPHEN, CAFFEINE 1 TABLET: 500; 65 TABLET, FILM COATED ORAL at 08:20

## 2024-11-05 RX ADMIN — ACETAMINOPHEN 650 MG: 325 TABLET, FILM COATED ORAL at 19:35

## 2024-11-05 RX ADMIN — METOPROLOL TARTRATE 50 MG: 50 TABLET, FILM COATED ORAL at 08:20

## 2024-11-05 RX ADMIN — FAMOTIDINE 20 MG: 20 TABLET ORAL at 08:20

## 2024-11-05 RX ADMIN — LOSARTAN POTASSIUM 50 MG: 50 TABLET, FILM COATED ORAL at 20:16

## 2024-11-05 ASSESSMENT — ACTIVITIES OF DAILY LIVING (ADL)
ADLS_ACUITY_SCORE: 0
BADLS,_PREVIOUS_FUNCTIONAL_LEVEL: INDEPENDENT
ADLS_ACUITY_SCORE: 0
IADLS,_PREVIOUS_FUNCTIONAL_LEVEL: INDEPENDENT
ADLS_ACUITY_SCORE: 0
IADLS,_PREVIOUS_FUNCTIONAL_LEVEL: INDEPENDENT
ADLS_ACUITY_SCORE: 0
PREVIOUS_RESPONSIBILITIES: MEAL PREP;HOUSEKEEPING;LAUNDRY;SHOPPING;MEDICATION MANAGEMENT;FINANCES;DRIVING;WORK
ADLS_ACUITY_SCORE: 0
ADLS_ACUITY_SCORE: 0
BADLS,_PREVIOUS_FUNCTIONAL_LEVEL: INDEPENDENT

## 2024-11-05 NOTE — PLAN OF CARE
Goal Outcome Evaluation:      Plan of Care Reviewed With: patient    Overall Patient Progress: no changeOverall Patient Progress: no change    Patient is alert and oriented x4. Make needs known. Woke up at 0200 and requested for seroquel and Norco for neck pain. Per patient, these meds are available to him overnight if he needs it. Was very anxious. Paged on-call. PRN meds were ordered but per Dr. Hurley it should not be taken together. Patient opted for Seroquel. PRN bengay for the neck was also applied. Patient settled down and sleep was encouraged. He is A1-2 in bed mobility. Continent of bladder but is on a primofit. Family at bedside. VSS. Refused left hand splint and L prafo boot. Call light in reach. Bed alarm on. Continue poc.

## 2024-11-05 NOTE — PROGRESS NOTES
"SPEECH LANGUAGE COGNITION EVALUATION     11/05/24 9128   Appointment Info   Signing Clinician's Name / Credentials (SLP) Ayde Douglasisael DURAND   General Information   Onset of Illness/Injury or Date of Surgery 10/02/24   Referring Physician Zac Hurley MD   Patient/Family Therapy Goal Statement (SLP) To gain mobility   Pertinent History of Current Problem Per provider note: \"Carl Sierra is a 49 year old left hand dominant male with past medical history of HTN and undiagnosed GLENN, who initially presented to October 2 with hypertensive emergency, left upper extremity weakness and numbness due to right MCA ischemic stroke. He underwent right craniectomy on October 5 for the management of cerebral edema and midline shift. Hospital course was complicated but he clinically improved and was transferred to ARU.\"   General Observations Patient pleasant, cooperative. Note left inattention/neglect. Patient needed assist of 2 for repositioning upright in bed. Patient used non-dominant right hand for written tasks.   Type of Evaluation   Type of Evaluation Speech, Language, Cognition   Motor Speech   Vocal Loudness (Motor Speech) reduced loudness   Speech Intelligibility (Motor Speech) conversational level  (80% with careful listening; appears variable)   Comment, Motor Speech Assessment Patient did not appear aware of communication breakdowns when intelligibility reduced.   Rate/Prosody (Motor Speech) rapid rate   Articulation (Motor Speech) imprecise articulation   Dysarthria differential diagnosis   (mild mixed unilateral UMN/ataxic dysarthria)   Conversational Level, Speech Intelligibility (Motor Speech) minimal impairment   Cognition   Cognitive Function attention deficit;executive function deficit;memory deficit;safety deficit   Additional cognitive-linguistic evaluation indicated  Completed   Cognitive Status Exam Comments See below for results of standardized assessment.   Affect/Mental Status (Cognition) " flat/blunted affect   Follows Commands (Cognition) WFL   Safety Deficit (Cognition) awareness of need for assistance;impulsivity;insight into deficits/self-awareness;judgment;problem-solving;safety precautions awareness;safety precautions follow-through/compliance   Executive Function Deficit (Cognition) severe deficit;insight/awareness of deficits;judgment;organization;planning/decision-making;problem-solving;reasoning;self-monitoring/self-correction   Attention Deficit (Cognition) severe deficit;focused/sustained attention;alternating attention;divided attention  (visual inattention on left side)   Memory Deficit (Cognition) moderate deficit;short-term memory   General Therapy Interventions   Planned Therapy Interventions Cognitive Treatment   Cognitive treatment Internal memory strategy training;External memory strategy training;Progressive attention training   Clinical Impression   Criteria for Skilled Therapeutic Interventions Met (SLP Eval) Yes, treatment indicated   SLP Diagnosis Cogntiive-communication impairment in areas of attention, memory, executive function and visuospatial skills   Risks & Benefits of therapy have been explained evaluation/treatment results reviewed;care plan/treatment goals reviewed;risks/benefits reviewed;current/potential barriers reviewed;participants voiced agreement with care plan;participants included;patient;spouse/significant other   Clinical Impression Comments Mild dysarthria and cogntiive-communication impairment in areas of attention, memory, executive function and visuospatial skills. Patient scored moderate severity overall on standardized assessment, but in severe range for attention, executive function and visuospatial skills and moderate severity range for memory. Patient scored WNL for language. Recommend initiate SLP itnerventions to optimize functional cognitive-communication for daily interactions and work/life demands as patient independent with all iADL's and  working FT PTA.   SLP Total Evaluation Time   Eval: Sound production with lang comprehension and expression Minutes (71302) 55   SLP Goals   Therapy Frequency (SLP Eval) 6 times/week   SLP Predicted Duration/Target Date for Goal Attainment 12/18/24   SLP Goals SLP Goal 1;SLP Goal 2;SLP Goal 3   SLP: Goal 1 Patient will attend to left side within tasks given strategies and mod cues.   SLP: Goal 2 Patient will recall important information with use of external aids and strategies given min cues.   SLP: Goal 3 Patient will complete easy to moderate level executive function and problem solving/reasoning tasks with 80% accuracy given min cues.   SLP Discharge Planning       SLP Time and Intention   Total Session Time (sum of timed and untimed services) 55   SLP - Acute Rehab Center Time                                    SUMMARY OF TEST:    The CLQT assesses visual attention and perception, working memory and language output skills, as well as auditory memory and comprehension.  Non-linguistic tasks can help assess planning, and self-monitoring, visual discrimination and analysis, as well as creativity and mental flexibility.   Together, these subtests assess the cognitive domains of attention, memory, executive function, language, and visuospatial skills using a severity rating of either WNL (within normal limits), Mild, Moderate or Severe.        Cognitive Domain                   Severity Rating/Score  Attention                                   Severe  Memory                                    Moderate  Executive Functions   Severe  Language                                 WNL  Visuospatial Skills                    Severe  Composite Severity Rating        1.8/4.0 (moderate)  Clock Drawing Severity Rating    Mild    INTERPRETATION OF TEST RESULTS: Cogntiive-communication impairment in areas of attention, memory, executive function and visuospatial skills. Patient scored moderate severity overall on standardized  assessment, but in severe range for attention, executive function and visuospatial skills and moderate severity range for memory. Patient scored WNL for language. Recommend initiate SLP itnerventions to optimize functional cognitive-communication for daily interactions and work/life demands as patient independent with all iADL's and working FT PTA.     Reference:  Yin Narvaez, Angela, CCC-SLP, (2001) [employer]

## 2024-11-05 NOTE — PHARMACY-MEDICATION REGIMEN REVIEW
Pharmacy Medication Regimen Review  Carl Sierra is a 49 year old male who is currently in the Acute Rehab Unit.    Assessment: Upon review of the medications and patient chart the following irregularities were found:   Duplicate therapies: Pantoprazole 40mg daily and Famotidine 20mg BID    Other Recommendations:   Medications that are clinically indicated and are not ordered: Clopidogrel 75mg daily    Plan:   Consider reordering clopidogrel while inpatient   Consider discontinuing famotidine as outlined in the H&P    3. Attending provider will be sent this note for review.  If there are any emergent issues noted above, pharmacist will contact provider directly by phone.      4. Pharmacy will periodically review the resident's medication regimen for any PRN medications not administered in > 72 hours and discontinue them. The pharmacist will discuss gradual dose reductions of psychopharmacologic medications with interdisciplinary team on a regular basis.    5. Please contact pharmacy if the above does not answer specific medication questions/concerns.    Background:  A pharmacist has reviewed all medications and pertinent medical history today.  Medications were reviewed for appropriate use and any irregularities found are listed with recommendations.      Current Facility-Administered Medications:     acetaminophen (TYLENOL) tablet 650 mg, 650 mg, Oral, Q4H PRN, Zac Hurley MD, 650 mg at 11/04/24 2120    acetaminophen-caffeine (EXCEDRIN TENSION HEADACHE) 500-65 MG tablet 1 tablet, 1 tablet, Oral, Daily, Yessica Vilallobos MD    amLODIPine (NORVASC) tablet 10 mg, 10 mg, Oral, Daily, Yessica Villalobos MD    aspirin (ASA) chewable tablet 81 mg, 81 mg, Oral, Daily, Zac Hurley MD    atorvastatin (LIPITOR) tablet 40 mg, 40 mg, Oral, QPM, Zac Hurley MD, 40 mg at 11/04/24 2030    bisacodyl (DULCOLAX) suppository 10 mg, 10 mg, Rectal, Daily PRN, Zac Hurley MD    glucose gel 15-30 g, 15-30 g, Oral, Q15 Min PRN  **OR** dextrose 50 % injection 25-50 mL, 25-50 mL, Intravenous, Q15 Min PRN **OR** glucagon injection 1 mg, 1 mg, Subcutaneous, Q15 Min PRN, Zac Hurley MD    famotidine (PEPCID) tablet 20 mg, 20 mg, Oral, BID, Zac Hurley MD, 20 mg at 11/04/24 2030    HYDROcodone-acetaminophen (NORCO) 5-325 MG per tablet 1 tablet, 1 tablet, Oral, Once PRN, Zac Hurley MD    insulin aspart (NovoLOG) injection (RAPID ACTING), 1-3 Units, Subcutaneous, TID AC, Zac Hurley MD    insulin aspart (NovoLOG) injection (RAPID ACTING), 1-3 Units, Subcutaneous, At Bedtime, Zac Hurley MD    insulin glargine (LANTUS PEN) injection 7 Units, 7 Units, Subcutaneous, QAM , Zac Hurley MD    insulin glargine (LANTUS PEN) injection 7 Units, 7 Units, Subcutaneous, At Bedtime, Zac Hurley MD, 7 Units at 11/04/24 2121    losartan (COZAAR) tablet 50 mg, 50 mg, Oral, BID, Yessica Villalobos MD, 50 mg at 11/04/24 2030    melatonin tablet 3 mg, 3 mg, Oral, At Bedtime, Zac Hurley MD, 3 mg at 11/04/24 2030    menthol (Topical Analgesic) 2.5% (BENGAY VANISHING SCENT) 2.5 % topical gel, , Topical, 4x Daily PRN, eYssica Villalobos MD, Given at 11/05/24 0227    metoprolol tartrate (LOPRESSOR) tablet 50 mg, 50 mg, Oral, BID, Yessica Villalobos MD, 50 mg at 11/04/24 2029    pantoprazole (PROTONIX) EC tablet 40 mg, 40 mg, Oral, TAYO Griselda MENDEZ Parisa, MD    polyethylene glycol (MIRALAX) Packet 17 g, 17 g, Oral, Daily, Zac Hurley MD, 17 g at 11/04/24 1740    thiamine (B-1) tablet 100 mg, 100 mg, Oral, Daily, Zac Hurley MD  No current outpatient prescriptions on file.   PMH: untreated/undiagnosed obstructive sleep apnea and hypertension

## 2024-11-05 NOTE — PLAN OF CARE
Discharge Planner Post-Acute Rehab SLP:     Discharge Plan: TCU vs home with ongoing SLP    Precautions: fall, crani/helmet, impulsivity    Current Status:  Hearing: WFL  Vision: glasses  Communication: Mild dysarthria; speech intelligibility 80% in conversation  Cognition: Moderate cogntiive-communication impairment in areas of attention, memory, executive function and visuospatial skills.   Swallow: Regular diet and thin liquids. 1:1 supervision/assist for safety due to impulsivity. Fully upright position and chair preferred. Small sips/bites. Slow pace.    Assessment: Mild dysarthria and cogntiive-communication impairment in areas of attention, memory, executive function and visuospatial skills. Patient scored moderate severity overall on standardized assessment, but in severe range for attention, executive function and visuospatial skills and moderate severity range for memory. Patient scored WNL for language. Recommend initiate SLP itnerventions to optimize functional cognitive-communication for daily interactions and work/life demands as patient independent with all iADL's and working FT PTA.     Other Barriers to Discharge (Family Training, etc): TBD

## 2024-11-05 NOTE — PROGRESS NOTES
11/05/24 0827   Appointment Info   Signing Clinician's Name / Credentials (PT) Margaux Benitez DPT   Living Environment   People in Home significant other   Living Environment Comments Lives w/ s/o Renea in [facility] w/ 1STE and 12-15 STI to access shower and laundry. 16 to go up stairs where bedrooms and shower are located. They have a bathroom on the main floor but no shower. Spouse working remotely.   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 Enjoys fishing, watching baseball.No formal exercise program. Does not own any equipment.   Post-Acute Assessment Only   Post-Acute Functional Assessment See below   Previous Level of Function/Home Environm   Bathing, Previous Functional Level independent   Grooming, Previous Functional Level independent   Dressing, Previous Functional Level independent   Eating/Feeding, Previous Functional Level independent   Toileting, Previous Functional Level independent   BADLs, Previous Functional Level independent   IADLs, Previous Functional Level independent   Bed Mobility, Previous Functional Level independent   Transfers, Previous Functional Level independent   Household Ambulation, Previous Functional Level independent   Stairs, Previous Functional Level independent   Community Ambulation, Previous Functional Level independent   Functional Cognition, Previous Functional Level WFL   General Information   Onset of Illness/Injury or Date of Surgery 10/02/24   Referring Physician Dr Angel Vargas, DO   Patient/Family Therapy Goals Statement (PT) Get function back   Pertinent History of Current Problem (include personal factors and/or comorbidities that impact the POC) Carl Sierra is a 49 year old left hand dominant male with past medical history of  "HTN and undiagnosed GLENN, who initially presented to October 2 with hypertensive emergency, left upper extremity weakness and numbness due to right MCA ischemic stroke. He underwent right craniectomy on October 5 for the management of cerebral edema and midline shift. Hospital course was complicated but he clinically improved and was transferred to ARU.   Existing Precautions/Restrictions   (fall, crani, LUE hemiplegic shoulder)   General Observations Followed up w/ MD, crani precautions but pt no longer needs to keep head >30degrees. Per spouse, pt was lying flat to help manage headaches. Spouse present during session and assisting w/ subjective. Spouse verbalizing, \"i hope he can walk out of here\". Discussed w/ pt and spouse, expectations for rehab timeline and possible DME pt may need at discharge. Encouraged family continue w/ setup for main level living.   Cognition   Cognitive Status Comments Follows single step commands 75% of the time. Easily distractible, able to redirect to task. At times looks towards spouse to answer questions.   Pain Assessment   Patient Currently in Pain   (L knee pain at baseline. Some L shoulder pain intermittently.)   Integumentary/Edema   Integumentary/Edema Comments No LE edema.   Posture    Posture Comments L glenohumeral subluxation 2 fingers. Long discussion w/ spouse and pt regarding importance of protection LUE and positioning in bed vs in w/c.   Range of Motion (ROM)   ROM Comment LLE WFL, crepitus at L knee  w/ flexion/ext   Strength (Manual Muscle Testing)   Strength Comments Flaccid LUE, functionally noted some proximal activation at pec/upper trap. LLE 0/5 plantar flexion/DF, minimal proximal activation observed functionally w/ bridging and spontaneous activation at quad.   Balance   Balance Comments At best requies minAx1 for sitting w/ RUE support, at most requiring maxAx2. PASS 0/36. Rice 0/56.   Sensory Examination   Sensory Perception Comments RLE intact vibration " light touch, diminished on LLE. Absent monofilament on LLE, 5/6 on RLE. Unable to assess kinesthetic awareness/proprioception d/t pt attention and distractibility .   Coordination   Coordination Comments Limited d/t L hemiparesis   Muscle Tone   Muscle Tone Comments Ankle PF and hamstrings 0/4   Clinical Impression   Criteria for Skilled Therapeutic Intervention Yes, treatment indicated   PT Diagnosis (PT) Force production deficit   Influenced by the following impairments Dense hemiparesis on L, Poor trunk control, impaired sensation, inattention, impaired cognition.   Functional limitations due to impairments Impaired gait and mobility   Clinical Presentation (PT Evaluation Complexity) evolving   Clinical Presentation Rationale >3 personal factors and comorbidities impacting plan of care   Clinical Decision Making (Complexity) high complexity   Planned Therapy Interventions (PT) balance training;groups;neuromuscular re-education;patient/family education;ROM (range of motion);strengthening;stretching;transfer training;TENS;wheelchair management/propulsion training;progressive activity/exercise;risk factor education   Risk & Benefits of therapy have been explained care plan/treatment goals reviewed;evaluation/treatment results reviewed;risks/benefits reviewed;participants voiced agreement with care plan;current/potential barriers reviewed;participants included;patient;spouse/significant other   Clinical Impression Comments Pt presents below baseline following R MCA stroke and small posterior cerebellar stroke, subsequent R craniectomy d/t cerebral edema and midline shift. Pt previously IND w/ all mobility and ADLs. PASS score indicative of pt w/c based at discharge and expect will need physical assist w/ transfers and ADLs pending progress. Need to further assess if L lateropulsion vs L inattention w/ dense hemiparesis and trunk weakness. Pt does have baseline L knee injury (PCL/MCL ~20 years ago). Will benefit from  skilled physical therapy to promote improved functional IND and decreased caregiver burden. ELOS 3-4 weeks.   PT Total Evaluation Time   PT Eval, High Complexity Minutes (59176) 20   Physical Therapy Goals   PT Frequency 6x/week   PT Predicted Duration/Target Date for Goal Attainment 12/02/24   PT Goals Bed Mobility;Transfers;Wheelchair Mobility;PT Goal 1;PT Goal 2   PT: Bed Mobility Minimal assist   PT: Transfers Minimal assist   PT: Wheelchair Mobility 150 feet;manual wheelchair   PT: Goal 1 Pt and family will independently verbalize 3 fall prevention strategies   PT: Goal 2 Pt's family will complete caregiver training and demonstrate 100% consistency w/ safe assist  for transfers, bed mobility, w/c management and positioning w/ pt prior to discharge.   Interventions   Interventions Quick Adds Therapeutic Activity   Therapeutic Activity   Therapeutic Activities: dynamic activities to improve functional performance Minutes (73955) 40   Treatment Detail/Skilled Intervention Obtained broda w/c to encourage OOB sitting. Attempted sarastedy transfer following ~5 min sitting EOB, however when attempting to setup, pt having worsening trunk control and not safe to attempt sarastedy at this time. Recommending Liko Ax2 for all transfers until improved trunk control. See GG. Pt incontinent w/ primofit on during session, removed primofit and Ax2 to change linenes/brief and don new shorts. Attempted donning shirt sitting EOB, not safe w/ pt's impaired trunk control and needing to maintain crani precautions w/ helmet, handoff to OT to trial and expect much safer when sitting up in w/c.   PT Discharge Planning   PT Plan Initiate FES. F/u w/ MD/orthotics to see if better fitting helmet available.   Physical Therapy Time and Intention   Timed Code Treatment Minutes 40   Total Session Time (sum of timed and untimed services) 60   Post Acute Settings Only   What unit is patient on? Acute Rehab   PT - Acute Rehab Center Time    Individual Time (minutes) - PT 60   Group Time (minutes) - PT 0   Concurrent Time (minutes) - PT 0   Co-Treatment Time (minutes) - PT 0   ARC Total Session Time (minutes) - PT 60   ARC Daily Total Session Time   PT ARC Daily Total Session Time 60   ARC Daily Rehab Total Minutes 60   Chair/bed-to-chair Transfer   Describe performance Ax2 liko   Roll Left and Right   Assistance Needed Adaptive equipment   Physical Assistance Level Total assistance   Roll Left to Right CARE Score 1   Sit to Lying   Assistance Needed Adaptive equipment   Physical Assistance Level Total assistance   Sit to Lying CARE Score 1   Lying to Sitting on Side of Bed   Assistance Needed Adaptive equipment   Physical Assistance Level Total assistance   Lying to Sitting CARE Score 1   Sit to Stand   Reason if not Attempted Safety concerns   Sitting to Standing CARE Score 88   Locomotion   Locomotion Comment unable, unsafe   Walk 10 Feet   Reason if not Attempted Safety concerns   Walk 10 Ft. CARE Score 88   Walk 50 Feet with Two Turns   Reason if not Attempted Safety concerns   Walk 50 Ft. CARE Score 88   Walk 150 Feet   Reason if not Attempted Safety concerns   Walk 150 Ft. CARE Score 88   Walking 10 Feet on Uneven Surfaces   Reason if not Attempted Safety concerns   Walking 10 Feet on Uneven Surfaces CARE Score 88   Wheel 50 Feet with Two Turns   Reason if not Attempted Safety concerns   Wheel 50 Feet with Two Turns CARE Score 88   Wheel 150 Feet   Reason if not Attempted Safety concerns   Wheel 150 Feet CARE Score 88   Stairs   Stairs Comment unable, unsafe   1 Step (Curb)   Reason if not Attempted Safety concerns   1 Step CARE Score 88   4 Steps   Reason if not Attempted Safety concerns   4 Steps CARE Score 88   12 Steps   Reason if not Attempted Safety concerns   12 Steps CARE Score 88   Picking Up Object   Reason if not Attempted Safety concerns    CARE Score 88   Car Transfer   Reason if not Attempted Safety concerns   Car Transfer  CARE Score 88

## 2024-11-05 NOTE — PLAN OF CARE
Discharge Planner Post-Acute Rehab OT:     Discharge Plan: Home with assistance vs TCU    Precautions: Falls, Craniectomy - helmet OOB, LUE/LLE hemiparesis, L neglect.  *Safe sitting plan    Current Status:  ADLs:  Mobility: A x 2 bed mobility. Lift transfer. Wheelchair based in Broda chair.   Grooming: Min A seated.  Dressing: UB A x 2 seated in chair. LB A x 2 supine.   Bathing: TBA  Toileting: Bedpan. Dependent hygiene/clothing.  IADLs: PLOF IND. Recommend assist.  Vision/Cognition: Full time corrective lenses. L neglect, possible peripheral field deficits. Moderate cognitive deficits attention, recall, comprehension, problem solving.     Assessment: Pt is a 49 yr old male admitted to IP ARU following hospitalization for a R MCA stroke. Pt is below functional baseline for ADLs due to hemiparesis, L neglect, impaired cognition, and impaired mobility. Goals to maximize IND and safety ADLs and functional mobility. Anticipating extended rehab course to reach a safe functional level to return home, need for increased physical assistance and home modifications for discharge home from ARU. ELOS 4 weeks.     Other Barriers to Discharge (DME, Family Training, etc):   Home set up: 1 LUPE. 16 stairs to access bedroom/bathroom. Can stay on main level with bedroom/bathroom but no shower.   Caregiver support: Partner working remotely, involved and present on unit.   Equipment needs pending progress.

## 2024-11-05 NOTE — PROGRESS NOTES
[facility]   Acute Rehabilitation Unit  Daily Progress Note    INTERVAL HISTORY  Notes and labs reviewed over past 24 hours. No acute overnight events reported. The patient has persistent left sided weakness. He has been experiencing pain and spasms in his extremities at nighttime. He additionally has pain in his right neck which he says is positional. He feels that his pain is well controlled by Tylenol. With regards to anxiety, he feels that the Seroquel has been useful. The therapy that he has received thus far has been going well.     No issues with eating/drinking. His last bowel movement was two days ago. No issues with urination. No headaches, nausea, vomiting, diarrhea, chest pain, or shortness of breath.     ROS: 10 point ROS was assessed and was negative unless otherwise stated in HPI    Functionally,    With PT:   Pending evaluation    With OT:   Pending evaluation    With SLP: 11/5  Hearing: WFL  Vision: glasses  Communication: Mild dysarthria; speech intelligibility 80% in conversation  Cognition: Moderate cogntiive-communication impairment in areas of attention, memory, executive function and visuospatial skills.   Swallow: Regular diet and thin liquids. 1:1 supervision/assist for safety due to impulsivity. Fully upright position and chair preferred. Small sips/bites. Slow pace.    MEDICATIONS  Scheduled meds  Current Facility-Administered Medications   Medication Dose Route Frequency Provider Last Rate Last Admin    acetaminophen-caffeine (EXCEDRIN TENSION HEADACHE) 500-65 MG tablet 1 tablet  1 tablet Oral Daily Yessica Villalobos MD   1 tablet at 11/05/24 0820    amLODIPine (NORVASC) tablet 10 mg  10 mg Oral Daily Yessica Villalobos MD   10 mg at 11/05/24 0820    aspirin (ASA) chewable tablet 81 mg  81 mg Oral Daily Zac Hurley MD   81 mg at 11/05/24 0819    atorvastatin (LIPITOR) tablet 40 mg  40 mg Oral QPM Zac Hurley MD   40 mg at 11/04/24 2030     clopidogrel (PLAVIX) tablet 75 mg  75 mg Oral Daily Zac Hurley MD   75 mg at 11/05/24 1044    enoxaparin ANTICOAGULANT (LOVENOX) injection 40 mg  40 mg Subcutaneous Q24H Zac Hurley MD        famotidine (PEPCID) tablet 20 mg  20 mg Oral BID Zac Hurley MD   20 mg at 11/05/24 0820    insulin aspart (NovoLOG) injection (RAPID ACTING)  1-3 Units Subcutaneous TID  Zac Hurley MD        insulin aspart (NovoLOG) injection (RAPID ACTING)  1-3 Units Subcutaneous At Bedtime Zac Hurley MD        insulin glargine (LANTUS PEN) injection 7 Units  7 Units Subcutaneous QAM  Zac Hurley MD   7 Units at 11/05/24 0827    insulin glargine (LANTUS PEN) injection 7 Units  7 Units Subcutaneous At Bedtime Zac Hurley MD   7 Units at 11/04/24 2121    losartan (COZAAR) tablet 50 mg  50 mg Oral BID Yessica Villalobos MD   50 mg at 11/05/24 0820    melatonin tablet 3 mg  3 mg Oral At Bedtime Zac Hurley MD   3 mg at 11/04/24 2030    metoprolol tartrate (LOPRESSOR) tablet 50 mg  50 mg Oral BID Yessica Villalobos MD   50 mg at 11/05/24 0820    pantoprazole (PROTONIX) EC tablet 40 mg  40 mg Oral QAM  Yessica Villalobos MD   40 mg at 11/05/24 0820    polyethylene glycol (MIRALAX) Packet 17 g  17 g Oral Daily Zac Hurley MD   17 g at 11/04/24 1740    sennosides (SENOKOT) tablet 2 tablet  2 tablet Oral Daily Zac Hurley MD        thiamine (B-1) tablet 100 mg  100 mg Oral Daily Zac Hurley MD   100 mg at 11/05/24 0820       PRN meds:  Current Facility-Administered Medications   Medication Dose Route Frequency Provider Last Rate Last Admin    acetaminophen (TYLENOL) tablet 650 mg  650 mg Oral Q4H PRN Zac Hurley MD   650 mg at 11/05/24 0958    bisacodyl (DULCOLAX) suppository 10 mg  10 mg Rectal Daily PRN Zac Hurley MD        glucose gel 15-30 g  15-30 g Oral Q15 Min PRN Zac Hurley MD        Or    dextrose 50 % injection 25-50 mL  25-50 mL Intravenous Q15 Min PRN Zac Hurley,  "MD        Or    glucagon injection 1 mg  1 mg Subcutaneous Q15 Min PRN Zac Hurley MD        HYDROcodone-acetaminophen (NORCO) 5-325 MG per tablet 1 tablet  1 tablet Oral Once PRN Zac Hurley MD        menthol (Topical Analgesic) 2.5% (BENGAY VANISHING SCENT) 2.5 % topical gel   Topical 4x Daily PRN Yessica Villalobos MD   Given at 11/05/24 0958    QUEtiapine (SEROquel) quarter-tab 6.25 mg  6.25 mg Oral At Bedtime PRN Zac Hurley MD             PHYSICAL EXAM  /74 (BP Location: Right arm, Patient Position: Semi-Vargas's)   Pulse 83   Temp 98.4  F (36.9  C) (Oral)   Resp 16   Ht 1.753 m (5' 9\")   Wt 102 kg (224 lb 13.9 oz)   SpO2 99%   BMI 33.21 kg/m    General: in no acute distress, conversational and alert, resting comfortably in bed  HEENT: atraumatic, nares clear, conjunctiva white  Pulmonary: on room air, no acute distress  Cardiovascular: Warm, well perfused  Abdominal: soft, non-tender to palpation, non-distended  Extremities: warm peripherally, no edema in BLEs  Skin: warm, dry, without erythema, ecchymosis, or rash noted. Craniotomy site C/D/I.   MSK: Calves non-tender to palpation  Neuro: Conjugate gaze, speech non-dysarthric, left facial droop, nystagmus with looking leftward. Flaccid paralysis of the left upper and lower extremity. No spasticity noted in LUE/LLE testing. No clonus noted.     LABS  Results for orders placed or performed during the hospital encounter of 11/04/24 (from the past 24 hours)   Glucose by meter   Result Value Ref Range    GLUCOSE BY METER POCT 128 (H) 70 - 99 mg/dL   Glucose by meter   Result Value Ref Range    GLUCOSE BY METER POCT 124 (H) 70 - 99 mg/dL   Basic metabolic panel   Result Value Ref Range    Sodium 135 135 - 145 mmol/L    Potassium 3.9 3.4 - 5.3 mmol/L    Chloride 102 98 - 107 mmol/L    Carbon Dioxide (CO2) 24 22 - 29 mmol/L    Anion Gap 9 7 - 15 mmol/L    Urea Nitrogen 5.4 (L) 6.0 - 20.0 mg/dL    Creatinine 0.69 0.67 - 1.17 mg/dL    GFR " Estimate >90 >60 mL/min/1.73m2    Calcium 9.3 8.8 - 10.4 mg/dL    Glucose 102 (H) 70 - 99 mg/dL   CBC with platelets   Result Value Ref Range    WBC Count 4.8 4.0 - 11.0 10e3/uL    RBC Count 4.38 (L) 4.40 - 5.90 10e6/uL    Hemoglobin 11.2 (L) 13.3 - 17.7 g/dL    Hematocrit 34.8 (L) 40.0 - 53.0 %    MCV 80 78 - 100 fL    MCH 25.6 (L) 26.5 - 33.0 pg    MCHC 32.2 31.5 - 36.5 g/dL    RDW 15.6 (H) 10.0 - 15.0 %    Platelet Count 261 150 - 450 10e3/uL   Glucose by meter   Result Value Ref Range    GLUCOSE BY METER POCT 105 (H) 70 - 99 mg/dL   Glucose by meter   Result Value Ref Range    GLUCOSE BY METER POCT 120 (H) 70 - 99 mg/dL       ASSESSMENT AND PLAN  Carl Sierra is a 49 year old left hand dominant male with past medical history of HTN and undiagnosed GLENN, who initially presented to October 2 with hypertensive emergency, left upper extremity weakness and numbness due to right MCA ischemic stroke. He underwent right craniectomy on October 5 for the management of cerebral edema and midline shift. Hospital course was complicated but he clinically improved and was transferred to ARU.      Updates Today:  - Agitation/Anxiety: Patient reported that 6.25mg at bedtime PRN during acute hospitalization had been beneficial in helping him sleep during the night. Will resume today.   - Right MCA Ischemic Stroke: Patient's plavix was held during acute hospitalization. Will resume today.  -Anticoagulation: Started lovenox 40 mg QD  - LUE Pain: Intermittent sharp pain shooting into the left hand. Occurring roughly 3x daily. Patient did not want to start additional medication at this time. But we will continue to monitor and could consider low dose gabapentin if indicated.     Admission to acute inpatient rehab: 11/4/2024   Impairment group code: 01.1 Stroke      PT, OT and SLP 60 minutes of each on a daily basis, 6x a week, for 25 days,  in addition to rehab nursing and close management of physiatrist.     Impairment of ADL's:  OT  for 60 min daily, 6x a week for 25 days, to work on upper and lower body self care, dressing, toileting, bathing, energy conservation techniques with use of ADs as needed   Impairment of mobility:   PT for 60 min daily, 6x a week for 25 days, to work on strengthening, endurance buildup, transfers and most likely WC mobility.   Impairment of cognition/language/swallow:   SLP for 60 min daily, 6x a week  for cognitive evaluation and treatment strategies for higher level cognitive deficits and memory impairment   Rehab RN to administer medication, patient education on medication taking, VS monitoring, bowel regimen, glucose monitoring and wound care/surgical wound dressing changes and monitoring.      Medical Conditions  # Right MCA ischemic stroke s/p TNK  # Cerebral edema s/p decompressive craniectomy 10/5  Brain MRI also showed  1. Relatively large acute infarct in the right middle cerebral artery territory involving the insular cortex as well as portions of the frontal and parietal cortex including the pre and postcentral gyri. Additional smaller acute infarcts are also present along the cortex elsewhere in the right middle cerebral artery territory. A few small acute infarcts are present in the posterior medial right cerebellum and a few punctate acute infarcts are present in the left anterior cerebral artery territory. There is no hemorrhage or mass effect.   2. Chronic infarcts in the left brachium pontis and left cerebral peduncle.   CT angio showed  Short segment severe stenosis of the posterior branch of the M2 segment of the right MCA.   1 cm long severe irregular stenosis of the A3 segment of the right anterior cerebral artery.   CT head was done on 10/18, which was concerning for hemorrhagic petechiae in right MCA territory with evolution of stroke. Repeat CT head ordered on 10/19, unchanged compared to previous imaging.      Completed 7 day course of [facility]   Discharging team and neurosurgery  recommended  low dose caffeine or modafinil to help with alertness if needed     - continue ASA and plavix  - consider modafinil.   - continue acetaminophen-caffeine (Excedrin Tension Headache) 500-65 mg tablet daily (was started on 10/25)    - stroke secondary prevention as below  - surgical wound healing well; staples were removed 10/21  - helmet when OOB   - follow up with neurosurgery for bone flap replacement - [facility]  (Would be direct admission day prior for lumbar drain placement and bone flap replacement the following day.)  - follow up stroke neurology and PM&R as outpatient       # Left Sided Hemiplegia, Flaccid  Patient reports persistent left sided weakness as well as nighttime pain and spasms in his extremities. Examination significant for flaccid paralysis of left upper and lower extremity with complete loss of tone.    - Monitor for spasms  - Consider gabapentin      # HTN  Not managed prior to admission    - continue metoprolol 50 bid   - continue losartan 50 bid (was dosed bid for better control of morning elevation in BP and can be changed to daily dosing)  - amlodipine 10 daily      # HLD  LDL 98 on 10/3.   - continue lipitor 40mg QD  - repeat lipid panel in 3 months      # DM II  HgbA1c was 9.2 10/3  - DM education   - continue lantus 7 units bid   - continue low dose sliding scale insulin   - BG check QID  - discontinued 2am check for now   - will consider addition of  an oral agent in the following days and adjust insulin dose accordingly   - consider endo consult pending his course   - repeat HgbA1C in 3 months      # NICM/HFrEF  EF 40 to 45% with global hypokinesis but no intracardiac shunt   - repeat TTE and follow up with cardiology in a month      # Probable undiagnosed GLENN   Family reported episodes of apnea during the night.   - continue prn O2  - formal sleep study as outpatient       # Hyponatremia, Resolving  Resolved per discharging team; it was 135 and slightly low on 11/4  -  Will continue to monitor with qMon/Thurs BMP     # Normocytic Anemia   Mild and stable, likely due to chronic disease vs CHAD.   - Will continue to monitor with qMon/Thurs CBC    # Headaches  # Neck pain   # L knee pain   Headaches seem to be well controlled on tylenol. Typically starts towards the end of the day and also responded to lying flat and hydration.   Was using lidocaine patch and bengay for neck pain as well as prn norco. Pain seems myofascial in nature with no clinical s/s of radiculopathy.   L knee pain for 20 years due to sports injury and likely early OA.   - continue tylenol 650 mg Q4H PRN   - was also on norco 0.5-1 tablet q4h prn since 10/29; was discontinued at discharge.   - reported no benefits from lidocaine so it was discontinued  - added topical bengay cream   - can consider TENS for the myofascial pain      # Insomnia   Per chart review, Seroquel made him agitated and was not beneficial so it was discontinued at discharge and per sign out from discharging team (last dose 11/2). However, on admission to ARU, patient states that Seroquel was beneficial.   - continue melatonin   - Seroquel 6.25 mg PO HS PRN   - consider ramelteon      # Obesity  BMI 33.86  - nutrition consult      # Depression  # Adjustment to disability  Worsening mood in the setting of his stroke and disability.   - rehab psych consult   - spiritual health consult   - can consider selective serotonin reuptake inhibitor vs SNRI      # Neurogenic bowel  - Miralax one packet scheduled daily   - Senna BID, starting tmrw AM   -  Bisacodyl 10mg suppository QD PRN     # Neurogenic bladder:   - ok to use primofit  - will start using urinal during the day and possibly toileting using the commode pending his course       FEN: regular with thin. Continued B1 supplement.   DVT Prophylaxis: DAPT, lovenox 40 mg QD  GI Prophylaxis: Added protonix upon admission. On pepcid bid which can discontinued pending his symptoms.    Code: Full -  confirmed upon admission   Disposition: he will WC based and will require assistance with all ADLs and mobility; home is not accessible. Will need as ramp and possibly chair lift for stairs.   ELOS/Discharge Date:  4 weeks and most likely TCU after that before discharge to home.  Rehab prognosis:  Good   Follow up Appointments on Discharge:   Outpatient PCP in 1-2 weeks.   Outpatient PM&R in 1-2 months.  Stroke neurology   Neurosurgery    Cardiology   Sleep medicine   Psychology     Seen and discussed with Dr. South CASTANEDA, PM&R staff physician     --   Ash Velez MS3    --   Zac Hurley MD  [facility] PM&R PGY-2  Pager: [phone]

## 2024-11-05 NOTE — PLAN OF CARE
"Goal Outcome Evaluation:      Plan of Care Reviewed With: patient, significant other    Overall Patient Progress: no changeOverall Patient Progress: no change    VS: /74 (BP Location: Right arm, Patient Position: Semi-Vargas's)   Pulse 83   Temp 98.4  F (36.9  C) (Oral)   Resp 16   Ht 1.753 m (5' 9\")   Wt 102 kg (224 lb 13.9 oz)   SpO2 99%   BMI 33.21 kg/m       O2: SpO2 > 99 and stable on RA. Denies chest pain and SOB.    Output: Voids spontaneously without difficulty. Primofit on during the shift    Last BM: 11/03 denies abdominal discomfort. BS active / passing flatus.    Activity: Up with A2 LIKO    Skin: WDL except, craniotomy incision    Pain: Pain managed with PRN tylenol    CMS: Intact, AOx4.    Dressing:    Diet: Regular diet. Denies nausea/vomiting.   BG checks before meals and at bedtime. BG check at bedtime   LDA: None    Equipment: personal belongings,    Plan: Continue with plan of care. Call light within reach, pt able to make needs known.    Additional Info: Safety rounds completed. Family at bedside throughout the shift.            "

## 2024-11-05 NOTE — PROGRESS NOTES
11/05/24 1100   Appointment Info   Signing Clinician's Name / Credentials (OT) Nubia Grimes OTR/L   Rehab Comments (OT) Initial OT evaluation   Living Environment   People in Home significant other   Current Living Arrangements other (see comments)  ([facility])   Home Accessibility stairs to enter home;stairs within home   Number of Stairs, Main Entrance 1   Number of Stairs, Within Home, Primary greater than 10 stairs   Transportation Anticipated family or friend will provide   Living Environment Comments Lives w/ s/o Renea in [facility] w/ 1STE and 12-15 STI to access shower and laundry. 16 to go up stairs where bedrooms and shower are located. They have a bathroom on the main floor but no shower. Spouse working remotely.   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 No formal exercise program. Does not own any equipment. Enjoys fishing, watching baseball.   Instrumental Activities of Daily Living (IADL)   Previous Responsibilities meal prep;housekeeping;laundry;shopping;medication management;finances;driving;work   IADL Comments Shares IADL responsibilities with spouse   Post-Acute Assessment Only   Post-Acute Functional Assessment See below   Previous Level of Function/Home Environm   Bathing, Previous Functional Level independent   Grooming, Previous Functional Level independent   Dressing, Previous Functional Level independent   Eating/Feeding, Previous Functional Level independent   Toileting, Previous Functional Level independent   BADLs, Previous Functional Level independent   IADLs, Previous Functional Level independent   Bed Mobility, Previous Functional Level independent   Transfers, Previous Functional Level independent   Household Ambulation, Previous Functional Level  "independent   Stairs, Previous Functional Level independent   Community Ambulation, Previous Functional Level independent   Functional Cognition, Previous Functional Level No concerns   Previous Level of Function Pt was independent with all ADLs/IADLs, transfers, mobility and gait with no assistive device.   Diet/Method of Nutritional Intake, Previous Level thin liquids;regular diet   General Information   Onset of Illness/Injury or Date of Surgery 10/02/24   Referring Physician Angel Vargas, DO   Patient/Family Therapy Goal Statement (OT) Pt goal to \"walk and do more for myself\"   Additional Occupational Profile Info/Pertinent History of Current Problem Carl Sierra is a 49 year old left hand dominant male with past medical history of HTN and undiagnosed GLENN, who initially presented to October 2 with hypertensive emergency, left upper extremity weakness and numbness due to right MCA ischemic stroke. He underwent right craniectomy on October 5 for the management of cerebral edema and midline shift. Hospital course was complicated but he clinically improved and was transferred to ARU.   Performance Patterns (Routines, Roles, Habits) Pt was independent with all ADLs/IADLs, transfers, mobility and gait with no assistive device. Pt driving and working.   Existing Precautions/Restrictions fall;other (see comments)  (craniectomy, helmet OOB, L flaccid hemiparesis)   Limitations/Impairments safety/cognitive;visual;sensory   Left Upper Extremity (Weight-bearing Status) full weight-bearing (FWB)   Right Upper Extremity (Weight-bearing Status) full weight-bearing (FWB)   Left Lower Extremity (Weight-bearing Status) full weight-bearing (FWB)   Right Lower Extremity (Weight-bearing Status) full weight-bearing (FWB)   Cognitive Status Examination   Orientation Status person   Affect/Mental Status (Cognitive) WNL   Follows Commands physical/tactile prompts required;repetition of directions required;verbal cues/prompting " required   Memory Deficit moderate deficit   Attention Deficit moderate deficit   Cognitive Status Comments Alert to self. Able to follow simple commands but requiring significant cues for tasks with multiple steps. Severe deficits L side attention. Functional deficits recall and problem solving.   Visual Perception   Visual Field Deficit other (see comments)  (L peripheral field deficit possible - more likely L side neglect/inattention)   Visual Attention L neglect with R gaze preference   Visual Processing Deficit rhianna-inattention/neglect, left   Visual Acuity Full time corrective lenses   Impact of Vision Impairment on Function (Vision) It was not clear if he had left homonymous hemianopsia or just an attention to the left side.   Sensory   Sensory Comments Diminished to light touch at left upper and left lower extremity   Pain Assessment   Patient Currently in Pain No   Posture   Posture Comments Risk for torticollis due to R gaze preference. Requiring lateral supports due to hemiparesis and inattention with unsupported sitting.   Range of Motion Comprehensive   Comment, General Range of Motion PROM BUE/BLE WNL. AROM severely limited LUE/LLE due to hemiparesis   Strength Comprehensive (MMT)   Comment, General Manual Muscle Testing (MMT) Assessment LUE/LLE flaccid hemiparesis. Initiating some voluntary muscle activation proximally. Not subluxed. He also had some resistance with synergistic movement of muscles in his left lower extremity and closed kinetic chain pattern   Muscle Tone Assessment   Muscle Tone Comments 1/4 MAS biceps and wrist flexors   Coordination   Upper Extremity Coordination Left UE impaired   Functional Limitations Decreased speed;Fine motor ADL performance impaired;Impaired ability to perform bilateral tasks;Object transport impaired;Reach to targets impaired   Coordination Comments L hemiparesis. L hand dominant   Bed Mobility   Comment (Bed Mobility) A x 2 rolling and bed mobility    Transfers   Transfer Comments A x 2 lift transfer   Balance   Balance Comments A x 2 unsupported sitting EOB. Unable to stand without significant assistance.   Clinical Impression   Criteria for Skilled Therapeutic Interventions Met (OT) Yes, treatment indicated   OT Diagnosis Decreased IND ADLs/IADLs and mobility   Influenced by the following impairments L hemiparesis, activity precautions, impaired cognition and communication, impaired coordination, impaired postural control, impaired activity tolerance.   OT Problem List-Impairments impacting ADL problems related to;activity tolerance impaired;balance;cognition;communication;coordination;mobility;motor control;muscle tone;range of motion (ROM);sensation;strength;postural control;vision   Assessment of Occupational Performance 5 or more Performance Deficits   Identified Performance Deficits Performance deficits include mobility, transfers, grooming, dressing, bathing, toileting, meal prep, household management, community transportation, med admin, work, sleep.   Planned Therapy Interventions (OT) ADL retraining;IADL retraining;balance training;bed mobility training;cognition;E-stim;fine motor coordination training;groups;motor coordination training;neuromuscular re-education;orthotic fitting/training;ROM;strengthening;stretching;transfer training;home program guidelines;progressive activity/exercise;visual perception   Clinical Decision Making Complexity (OT) comprehensive assessment/high complexity   Risk & Benefits of therapy have been explained evaluation/treatment results reviewed;care plan/treatment goals reviewed;risks/benefits reviewed;current/potential barriers reviewed;participants voiced agreement with care plan;participants included;patient;spouse/significant other   Clinical Impression Comments Pt is a 49 yr old male admitted to  ARU following hospitalization for a R MCA stroke. Pt is below functional baseline for ADLs due to hemiparesis, L  neglect, impaired cognition, and impaired mobility. Goals to maximize IND and safety ADLs and functional mobility. Anticipating extended rehab course to reach a safe functional level to return home, anticipating need for increased physical assistance and home modifications for discharge home from ARU. ELOS 4 weeks.   OT Total Evaluation Time   OT Eval, High Complexity Minutes (44504) 30   OT Goals   Therapy Frequency (OT) 6 times/week   OT Predicted Duration/Target Date for Goal Attainment 12/03/24   OT Goals Hygiene/Grooming;Upper Body Dressing;Lower Body Dressing;Upper Body Bathing;Lower Body Bathing;Bed Mobility;Transfers;Toilet Transfer/Toileting;Meal Preparation;Home Management;OT Goal 1;Cognition   OT: Hygiene/Grooming independent   OT: Upper Body Dressing Minimal assist   OT: Lower Body Dressing Minimal assist   OT: Upper Body Bathing Minimal assist   OT: Lower Body Bathing Minimal assist   OT: Bed Mobility Minimal assist   OT: Transfer Minimal assist   OT: Toilet Transfer/Toileting Moderate assist   OT: Cognitive Patient/caregiver will verbalize understanding of cognitive assessment results/recommendations as needed for safe discharge planning   OT: Goal 1 Patient will demo Min A functional transfer to shower using DME/AE prn   Self-Care/Home Management   Self-Care/Home Mgmt/ADL, Compensatory, Meal Prep Minutes (83483) 30   Symptoms Noted During/After Treatment (Meal Preparation/Planning Training) fatigue   Treatment Detail/Skilled Intervention Initial OT evaluation completed and plan of care established. Provided education on role of OT in  ARU, goals, and plan of care. Initial assessment of functional mobility and ADLs, recommend A x 2 bed mobility and overhead lift for transfers. Collaboration with PT to issue a broda wheelchair with lateral support, issue gel cushion for LUE support. Created a safe sitting plan. Will further determine a toileting plan, likely dependent rolling commode chair with lift  assistance. Reinforced safe management of LUE with mobility. They have a resting hand orthosis from the hospital, confirmed wear schedule.   OT Discharge Planning   OT Plan Shower - Lift transfer to dependent rolling shower chair, supine assist for LB cares, UB cares upright in broda chair. Determine toileting plan.   Total Session Time   Timed Code Treatment Minutes 30   Total Session Time (sum of timed and untimed services) 60   Post Acute Settings Only   What unit is patient on? Acute Rehab   OT - Acute Rehab Center Time   Individual Time (minutes) - OT 60   Group Time (minutes) - OT 0   Concurrent Time (minutes) - OT 0   Co-Treatment Time (minutes) - OT 0   ARC Total Session Time (minutes) - OT 60   ARC Daily Total Session Time   OT ARC Daily Total Session Time 60   ARC Daily Rehab Total Minutes 175   Oral Hygiene   Complete independence for oral hygiene tasks no   Describe performance Assist to set up brush and paste. Assistance to guide brush to mouth due to L neglect. Cues for thorough oral cares, especially for L side.   Physical assistance for oral hygiene tasks Requires minimal assistance for oral hygiene, less than or equal to 25% assist provided by staff   Grooming (except oral cares)   Grooming Task Performed Washing hands;Washing face   Functional Performance Assistance required for grooming activity (see comments)   Grooming Comment Requiring assistance to wash/dry hands. Able to wash/dry face with set up and cues.   Upper Body Dressing   Describe performance Assist to thread BUE into shirt and pull overhead, A x 2 to adjust down trunk while seated in broda chair.   Clothing Utilized Shirt   Completely independent with Upper Body Dressing no   Physical assistance to don/doff clothing above the waist Total assistance for donning/doffing upper body items, 76-99% assist provided by staff   Lower Body Dressing (Pants/Undergarments)   Complete independence with Lower Body Dressing  Pants/Undergarments no    Describe performance Assist to thread BLE into short, rolling R/L in supine A x 2, assist to pull up over hips   Clothing Utilized Pants   Physical assistance to don/doff clothing below the waist, excluding shoes Completely dependent, pt does none of the effort or 2 helpers   Lower Body Dressing putting on/taking off footwear   Complete independence with Lower Body Dressing Footwear no   Describe performance Dependent to doff/don socks.   Physical assistance to don/doff socks/shoes and other foorwear Galloway does ALL of the effort to don socks/shoes and other footwear   Toilet Hygiene   Complete independence Toileting Task no   Describe performance A x 2 supine hygiene/clothing management.   Physical assistance to complete Toileting Task Complete dependence for toileting (patient does none of effort) or assist of 2 helpers   Toilet Transfer   Complete independence with getting on and off a toilet or commode no   Describe performance Currently using bedpan. Clinical judgement anticipating liko lift transfer A x 2 to dependent commode chair.   Physical assistance for getting on and off a toilet or commode Completely dependent for getting on and off the toilet/commode, pt does none of the effort or 2 helpers.   Chair/bed-to-chair Transfer   Complete independence for chair-bed-to-chair transfer no   Describe performance A x 2 lift transfer   Physical assistance for getting from chair-bed-to-chair Completely dependent for getting from chair-bed-to-chair, pt does none of the effort or 2 helpers

## 2024-11-05 NOTE — CONSULTS
Social Work: Initial Assessment with Discharge Plan    Patient Name: Carl Sierra  : 1975  Age: 49 year old  MRN: [id number]  Completed assessment with: Chart review and interview with patient, patient's significant other, and s/o's mother.  Admitted to ARU: 2024    Presenting Information   Date of SW assessment: 2024  Health Care Directive: Declined completing  Primary Health Care Agent: Patient/self   Secondary Health Care Agent: YOANK would be mother.  Living Situation: Lives with significant other in a two story town home.   Previous Functional Status: IND with ADLs and IADLs at baseline. Driving, Renea able to provide transportation.  DME available: See therapy evaluation for more information   Patient and family understanding of hospitalization: Appropriate and pleasant  Cultural/Language/Spiritual Considerations: 49 year old male, English speaking, Black/.     Physical Health  Reason for admission: Carl Sierra is a 49 year old left hand dominant male with past medical history of untreated/undiagnosed obstructive sleep apnea and hypertension who was initially admitted on  with hypertensive emergency, left upper extremity numbness and weakness.  He was found to have right MCA ischemic stroke and NORMA.  He received TNK on  with no significant improvement of his symptoms.  Brain MRI also showed small right posterior cerebellar stroke and multiple old infarcts.     His hospital course was complicated by progression of his symptoms due to worsening cerebral edema and midline shift requiring right decompressive craniectomy on .  He was also diagnosed by DM II, HLD, nonischemic cardiomyopathy and heart failure with reserved ejection fraction, oral candidiasis, and strep group B and H. Influenza infection (received 5 days course of amoxicillin 10/11-)  and depression.     Repeat head CT on 10/29 showed revolving infarction, unchanged degree of edema  and herniation and some petechial hemorrhage which was also grossly unchanged.      Unfortunately his left-sided weakness has been persistent. Staples were removed from his surgical incision site and he has to use helmet when he is out of bed.  He has had some episodes of waxing and waning of his mental status and headaches.  It seems like his mental status and headaches have both improved with Tylenol and other medical management.  He required NG tube placement and tube feeding early after his surgery but that was removed and he has been able to eat well.  He had hyponatremia which resolved.  Neurosurgery team is planning for a cranioplasty in about 4 to 6 weeks.  Cardiology team also recommended repeat echocardiogram in a month and follow-up in clinic.    Provider Information   Primary Care Physician:No primary care provider, goes to [facility].  ARU [facility] will schedule PCP apt at discharge.   : None reported     Mental Health/Chemical Dependency:   Diagnosis: Pt reports feeling more down, likely related to hospitalization. Denied further mental health needs at this time.   Alcohol/Tobacco/Narcotis: Denies tobacco use.   Support/Services in Place: None reported  Services Needed/Recommended: [facility] support while on ARU available.   Sexuality/Intimacy: Not discussed     Support System  Marital Status: Not , lives with significant other Renea. Renea is able to work from home.   Family support: Renea's family lives nearby and is supportive. Patient's mother lives in [place] and is able to fly in.  Other support available: Pt's brothers live out of state ([place], [place], [place])    Community Resources  Current in home services: Pt denies  Previous services: None reported    Financial/Employment/Education  Employment Status: Works full-time as an  at the [employer].  Income Source: Wages. Currently on FMLA, using PTO. Looked into STD, but was  "not eligible (didn't opt in when signing up for benefits)  Education: Not discussed  Financial Concerns:  Pt denies  Insurance: MEDICA ESSENTIAL     Discharge Plan   Patient and family discharge goal: TBD, pending progress  Provided Education on discharge plan: Evaluations and discharge recommendations pending.   Patient agreeable to discharge plan:  Pending further discussion. Evaluations and discharge recommendations pending.   Provided education and attained signature for Medicare IM and IRF Patient Rights and Privacy Information provided to patient : N/A  Provided patient with [place] [facility] [place] Resources: YES  Barriers to discharge: TBD    Discharge Recommendations   Disposition: See above   Transportation Needs: Patient, family/friends, paid transport, insurance transport (if applicable)     Additional comments   Discharge KAY, CONNIE 28 days    SW added significant other, Earnestines, contact information to the facesheet (PH: [phone]).     SW will remain available and continue to follow as needs arise.     -------------------------------------------------------------------------------------------------------------  REINALDO Pain Assessment    Pain Effect on Sleep  Over the past 5 days, how much of the time has pain made it hard for you to sleep at night?\"    3. Frequently    Pain Interference with Therapy Activities  \"Over the past 5 days, how often have you limited your participation in rehabilitation therapy sessions due to pain?\"  3. Frequently    Pain Interference with Day-to-Day Activities  \"Over the past 5 days, how often have you limited your day-to-day activities (excluding rehabilitation therapy sessions) because of pain?\"  3. Frequently    -------------------------------------------------------------------------------------------------------------    JUS Pisano  [facility], Acute Inpatient Rehab Unit   [facility], [address] [place] [address]  Phone: " [phone], Fax: [phone]

## 2024-11-05 NOTE — PLAN OF CARE
Discharge Plan: home w/ assist. HHPT    Precautions: fall, crani, helmet OOB. Do not leave alone EOB/commode.    Current Status:  Bed Mobility: Ax2 rolling and sup<>sit  Transfer: Liko Ax2  Gait: unable, unsafe  Stairs: unable, unsafe  Balance: Needs maxAx1-2 for static sitting     Outcome Measures:   PASS    11/5: 0/36  Rice   11/5: 0/56    Assessment: Pt presents below baseline following R MCA stroke and small posterior cerebellar stroke, subsequent R craniectomy d/t cerebral edema and midline shift. Pt previously IND w/ all mobility and ADLs. PASS score indicative of pt w/c based at discharge and expect will need physical assist w/ transfers and ADLs pending progress. Need to further assess if L lateropulsion vs L inattention w/ dense hemiparesis and trunk weakness. Pt does have baseline L knee injury (PCL/MCL ~20 years ago). Will benefit from skilled physical therapy to promote improved functional IND and decreased caregiver burden. ELOS 3-4 weeks.     Other Barriers to Discharge (DME, Family Training, etc):   1 LUPE (threshold) + 15STI to access bedroom/bathroom. Option for main floor living in [facility].     DME: pending progress  ________________________  Postural Assessment for Stroke Scale (PASS)    Maintaining posture -   1) Sitting without support - 0  2) Standing with support (feet position free) - 0  3) Standing without support (feet position free) - 0  4) Standing on nonparetic leg - 0  5) Standing on paretic leg - 0    Changing posture -  6) Rolling supine -> affected side - 0  7) Rolling supine -> unaffected side - 0  8) Sup->sitting edge of bed - 0  9) Sitting edge of bed -> sup - 0  10) Sit->stand without support - 0  11) Stand->sit without support - 0  12) Standing,  pencil from floor without support - 0    Total Score - 0/36    The PASS assesses a patient's ability to change and maintain posture during different balance activities. It is not associated with falls risk, but is used to track  progress with the patient's ability to control their posture and balance throughout the course of the patient's admission.    A score of <22 points at admission to Acute Rehab is predictive that pt is not likely to be walking independently at 3 months.   (Jono et al. 2021. Postural Maintenance Is Associated with Walking Ability in People Received Acute Rehabilitation after Stroke)

## 2024-11-06 ENCOUNTER — APPOINTMENT (OUTPATIENT)
Dept: OCCUPATIONAL THERAPY | Facility: CLINIC | Age: 49
DRG: 057 | End: 2024-11-06
Attending: PHYSICAL MEDICINE & REHABILITATION
Payer: COMMERCIAL

## 2024-11-06 ENCOUNTER — APPOINTMENT (OUTPATIENT)
Dept: PHYSICAL THERAPY | Facility: CLINIC | Age: 49
DRG: 057 | End: 2024-11-06
Attending: PHYSICAL MEDICINE & REHABILITATION
Payer: COMMERCIAL

## 2024-11-06 ENCOUNTER — APPOINTMENT (OUTPATIENT)
Dept: SPEECH THERAPY | Facility: CLINIC | Age: 49
DRG: 057 | End: 2024-11-06
Attending: PHYSICAL MEDICINE & REHABILITATION
Payer: COMMERCIAL

## 2024-11-06 LAB
GLUCOSE BLDC GLUCOMTR-MCNC: 101 MG/DL (ref 70–99)
GLUCOSE BLDC GLUCOMTR-MCNC: 110 MG/DL (ref 70–99)
GLUCOSE BLDC GLUCOMTR-MCNC: 111 MG/DL (ref 70–99)
GLUCOSE BLDC GLUCOMTR-MCNC: 179 MG/DL (ref 70–99)

## 2024-11-06 PROCEDURE — 97530 THERAPEUTIC ACTIVITIES: CPT | Mod: GP

## 2024-11-06 PROCEDURE — 90791 PSYCH DIAGNOSTIC EVALUATION: CPT | Performed by: CLINICAL NEUROPSYCHOLOGIST

## 2024-11-06 PROCEDURE — 99232 SBSQ HOSP IP/OBS MODERATE 35: CPT | Performed by: PHYSICAL MEDICINE & REHABILITATION

## 2024-11-06 PROCEDURE — 97112 NEUROMUSCULAR REEDUCATION: CPT | Mod: GP

## 2024-11-06 PROCEDURE — 250N000013 HC RX MED GY IP 250 OP 250 PS 637: Performed by: PHYSICAL MEDICINE & REHABILITATION

## 2024-11-06 PROCEDURE — 128N000003 HC R&B REHAB

## 2024-11-06 PROCEDURE — 250N000011 HC RX IP 250 OP 636

## 2024-11-06 PROCEDURE — 250N000013 HC RX MED GY IP 250 OP 250 PS 637

## 2024-11-06 PROCEDURE — 92610 EVALUATE SWALLOWING FUNCTION: CPT | Mod: GN

## 2024-11-06 PROCEDURE — 97535 SELF CARE MNGMENT TRAINING: CPT | Mod: GO | Performed by: OCCUPATIONAL THERAPIST

## 2024-11-06 RX ORDER — LIDOCAINE 4 G/G
1 PATCH TOPICAL
Status: DISCONTINUED | OUTPATIENT
Start: 2024-11-06 | End: 2024-11-22 | Stop reason: HOSPADM

## 2024-11-06 RX ORDER — BISACODYL 10 MG
10 SUPPOSITORY, RECTAL RECTAL DAILY
Status: DISCONTINUED | OUTPATIENT
Start: 2024-11-06 | End: 2024-11-22 | Stop reason: HOSPADM

## 2024-11-06 RX ADMIN — METOPROLOL TARTRATE 50 MG: 50 TABLET, FILM COATED ORAL at 08:00

## 2024-11-06 RX ADMIN — Medication: at 08:01

## 2024-11-06 RX ADMIN — SENNOSIDES 2 TABLET: 8.6 TABLET, FILM COATED ORAL at 07:56

## 2024-11-06 RX ADMIN — BISACODYL 10 MG: 10 SUPPOSITORY RECTAL at 18:07

## 2024-11-06 RX ADMIN — ENOXAPARIN SODIUM 40 MG: 40 INJECTION SUBCUTANEOUS at 14:59

## 2024-11-06 RX ADMIN — Medication 6.25 MG: at 02:05

## 2024-11-06 RX ADMIN — FAMOTIDINE 20 MG: 20 TABLET ORAL at 21:12

## 2024-11-06 RX ADMIN — ACETAMINOPHEN 650 MG: 325 TABLET, FILM COATED ORAL at 11:13

## 2024-11-06 RX ADMIN — POLYETHYLENE GLYCOL 3350 17 G: 17 POWDER, FOR SOLUTION ORAL at 19:52

## 2024-11-06 RX ADMIN — HYDROCODONE BITARTRATE AND ACETAMINOPHEN 1 TABLET: 5; 325 TABLET ORAL at 04:52

## 2024-11-06 RX ADMIN — Medication: at 01:43

## 2024-11-06 RX ADMIN — CLOPIDOGREL BISULFATE 75 MG: 75 TABLET ORAL at 08:00

## 2024-11-06 RX ADMIN — ACETAMINOPHEN 650 MG: 325 TABLET, FILM COATED ORAL at 00:59

## 2024-11-06 RX ADMIN — PANTOPRAZOLE SODIUM 40 MG: 40 TABLET, DELAYED RELEASE ORAL at 04:52

## 2024-11-06 RX ADMIN — THIAMINE HCL TAB 100 MG 100 MG: 100 TAB at 08:00

## 2024-11-06 RX ADMIN — INSULIN GLARGINE 7 UNITS: 100 INJECTION, SOLUTION SUBCUTANEOUS at 22:32

## 2024-11-06 RX ADMIN — METOPROLOL TARTRATE 50 MG: 50 TABLET, FILM COATED ORAL at 21:12

## 2024-11-06 RX ADMIN — LOSARTAN POTASSIUM 50 MG: 50 TABLET, FILM COATED ORAL at 08:00

## 2024-11-06 RX ADMIN — ACETAMINOPHEN 650 MG: 325 TABLET, FILM COATED ORAL at 21:12

## 2024-11-06 RX ADMIN — Medication 3 MG: at 19:52

## 2024-11-06 RX ADMIN — INSULIN GLARGINE 7 UNITS: 100 INJECTION, SOLUTION SUBCUTANEOUS at 08:02

## 2024-11-06 RX ADMIN — ACETAMINOPHEN 650 MG: 325 TABLET, FILM COATED ORAL at 16:34

## 2024-11-06 RX ADMIN — FAMOTIDINE 20 MG: 20 TABLET ORAL at 08:00

## 2024-11-06 RX ADMIN — ASPIRIN 81 MG CHEWABLE TABLET 81 MG: 81 TABLET CHEWABLE at 07:56

## 2024-11-06 RX ADMIN — AMLODIPINE BESYLATE 10 MG: 10 TABLET ORAL at 08:00

## 2024-11-06 RX ADMIN — ATORVASTATIN CALCIUM 40 MG: 40 TABLET, FILM COATED ORAL at 21:12

## 2024-11-06 RX ADMIN — ACETAMINOPHEN, CAFFEINE 1 TABLET: 500; 65 TABLET, FILM COATED ORAL at 07:55

## 2024-11-06 RX ADMIN — LOSARTAN POTASSIUM 50 MG: 50 TABLET, FILM COATED ORAL at 21:12

## 2024-11-06 RX ADMIN — Medication: at 15:07

## 2024-11-06 RX ADMIN — LIDOCAINE 1 PATCH: 4 PATCH TOPICAL at 19:52

## 2024-11-06 NOTE — PROGRESS NOTES
"Acute Rehabilitation Unit   Clinical Swallow Evaluation    11/06/24 1417   Appointment Info   Signing Clinician's Name / Credentials (SLP) Holly Darnell MS CCC SLP   General Information   Onset of Illness/Injury or Date of Surgery 11/04/24   Referring Physician Zac Hurley MD   Patient/Family Therapy Goal Statement (SLP) To gain mobility   Pertinent History of Current Problem Per provider note: \"Carl Sierra is a 49 year old left hand dominant male with past medical history of HTN and undiagnosed GLENN, who initially presented to October 2 with hypertensive emergency, left upper extremity weakness and numbness due to right MCA ischemic stroke. He underwent right craniectomy on October 5 for the management of cerebral edema and midline shift. Hospital course was complicated but he clinically improved and was transferred to ARU.\"   General Observations Pt is accompanied by s/o. He is agreeable to evaluation.   Type of Evaluation   Type of Evaluation Swallow Evaluation   Oral Motor   Oral Musculature anomalies present   Structural Abnormalities none present   Mucosal Quality adequate   Dentition (Oral Motor)   Dentition (Oral Motor) adequate dentition   Facial Symmetry (Oral Motor)   Facial Symmetry (Oral Motor) left side impairment   Left Side Facial Asymmetry minimal impairment   Lip Function (Oral Motor)   Lip Range of Motion (Oral Motor) protrusion impairment;retraction impairment   Tongue Function (Oral Motor)   Tongue ROM (Oral Motor) WNL   Vocal Quality/Secretion Management (Oral Motor)   Vocal Quality (Oral Motor) WFL   Secretion Management (Oral Motor) WNL   Comment, Vocal Quality/Secretion Management (Oral Motor) Pt reports a nasal vocal quality   General Swallowing Observations   Past History of Dysphagia Pt was seen for dysphagia during acute care admission at [facility]. He completed a VFSS on 10/11- this was negative for aspiration but a pureed diet and thin liquid diet was recommended d/t level of fatigue " impacting oral phase. Since that time he was advanced to a regular diet and has been tolerating well   Respiratory Support room air   Current Diet/Method of Nutritional Intake (General Swallowing Observations, NIS) regular diet;thin liquids (level 0)   Swallowing Evaluation Clinical swallow evaluation   Clinical Swallow Evaluation   Feeding Assistance set up only required   Clinical Swallow Evaluation Textures Trialed thin liquids;solid foods   Clinical Swallow Eval: Thin Liquid Texture Trial   Mode of Presentation, Thin Liquids straw;self-fed   Volume of Liquid or Food Presented 8 oz   Oral Phase of Swallow WFL   Pharyngeal Phase of Swallow intact   Diagnostic Statement No overt s/sx of aspiration   Clinical Swallow Evaluation: Solid Food Texture Trial   Mode of Presentation self-fed   Volume Presented potatoes and sandwich   Oral Phase WFL   Pharyngeal Phase intact   Diagnostic Statement Adequate mastication, mild oral residue/pocketing in L buccal cavity but pt able to eventually clear. No overt s/sx of aspiration. Pt denied any difficulty   Esophageal Phase of Swallow   Patient reports or presents with symptoms of esophageal dysphagia No   Swallowing Recommendations   Diet Consistency Recommendations regular diet;thin liquids (level 0)   Supervision Level for Intake distant supervision needed   Mode of Delivery Recommendations bolus size, small;slow rate of intake   Swallowing Maneuver Recommendations alternate food and liquid intake   Monitoring/Assistance Required (Eating/Swallowing) cue for finger/lingual sweep if oral pocketing present;stop eating activities when fatigue is present   Recommended Feeding/Eating Techniques (Swallow Eval) maintain upright sitting position for eating;minimize distractions during oral intake   Medication Administration Recommendations, Swallowing (SLP) As tolerated, pt reports taking whole with water   Clinical Impression   Criteria for Skilled Therapeutic Interventions Met (SLP  Eval) Evaluation only   SLP Diagnosis Mild oropharyngeal dysphagia   Risks & Benefits of therapy have been explained evaluation/treatment results reviewed;participants voiced agreement with care plan;participants included;patient;spouse/significant other   Clinical Impression Comments Pt seen for clinical swallow evaluation during lunch meal. His s/o is present and provided set up assist for self feeding as pt is left handed with current left handed deficits. Oral mech is notable for reduced L sided labial reduced ROM. Speech is mildly dysarthric and pt reports a nasal quality. Pt consumed all PO trials during meal without overt s/sx of aspiration. Pt denies any difficulty, pain, or globus sensation. Mild oral residue which pt acknowledges but given time and use of liquid wash he was able to clear his oral cavity WFL.     Mild oropharyngeal dysphagia, pt is independently utilizing strategies for safe and efficient PO intake. Continue a regular diet and thin liquids with general swallow strategies of upright position, small bites/sips, slow rate of intake, and alternate solids and liquids using a tongue sweep as needed to clear oral cavity. Swallow evaluation only, no need for additional skilled services at this time. Continue SLP services for dysarthria and cog/ling deficits.   SLP Total Evaluation Time   Eval: oral/pharyngeal swallow function, clinical swallow Minutes (71996) 40

## 2024-11-06 NOTE — PLAN OF CARE
Goal Outcome Evaluation:      Plan of Care Reviewed With: patient    Overall Patient Progress: no changeOverall Patient Progress: no change     Patient is alert and oriented, able to make needs known, slept fairly overnight, complained of headache and right shoulder pain, PRN Tylenol given and ice pack, patient then still unable to sleep, PRN Seroquel given  otherwise no chest pain and respiratory distress. Primo fit in placed, voiding well, NO BM this shift.    Patient woke up again at around 5am  complained of right shoulder pain, IN Norco administered. Safety checks done, fall prevention maintained, bed alarm ON. Plan of care ongoing.

## 2024-11-06 NOTE — PROGRESS NOTES
[facility]   Acute Rehabilitation Unit  Daily Progress Note    INTERVAL HISTORY  Notes and labs reviewed over past 24 hours. No acute overnight events reported.  This late morning he states that right shoulder area feels tight.  Denies chest pain, shortness of breath, no fever or chills.  Discussed rehab goals as well as stretches when not in therapy.  Will have cream at bedside.     ROS: 10 point ROS was assessed and was negative unless otherwise stated in HPI    Functional  OT:  Current Status:  ADLs:  Mobility: A x 2 bed mobility. Lift transfer. Wheelchair based in Broda chair.   Grooming: Min A seated.  Dressing: UB A x 2 seated in chair. LB A x 2 supine.   Bathing: liko lift Ax2 to/from purple shower chair/commode. Assistance with washing/rinsing/drying 6/10 body parts.   Toileting: Liko lift Ax2 to/from purple dependent shower chair/commode. Dependent hygiene/clothing.  IADLs: PLOF IND. Recommend assist.  Vision/Cognition: Full time corrective lenses. L neglect, possible peripheral field deficits. Moderate cognitive deficits attention, recall, comprehension, problem solving.       MEDICATIONS  Scheduled meds  Current Facility-Administered Medications   Medication Dose Route Frequency Provider Last Rate Last Admin    acetaminophen-caffeine (EXCEDRIN TENSION HEADACHE) 500-65 MG tablet 1 tablet  1 tablet Oral Daily Yessica Villalobos MD   1 tablet at 11/06/24 0755    amLODIPine (NORVASC) tablet 10 mg  10 mg Oral Daily Yessica Villalobos MD   10 mg at 11/06/24 0800    aspirin (ASA) chewable tablet 81 mg  81 mg Oral Daily Zac Hurley MD   81 mg at 11/06/24 0756    atorvastatin (LIPITOR) tablet 40 mg  40 mg Oral QPM Zac Hurley MD   40 mg at 11/05/24 2016    clopidogrel (PLAVIX) tablet 75 mg  75 mg Oral Daily Zac Hurley MD   75 mg at 11/06/24 0800    enoxaparin ANTICOAGULANT (LOVENOX) injection 40 mg  40 mg Subcutaneous Q24H Zac Hurley MD   40 mg at 11/05/24  1403    famotidine (PEPCID) tablet 20 mg  20 mg Oral BID Zac Hurley MD   20 mg at 11/06/24 0800    insulin aspart (NovoLOG) injection (RAPID ACTING)  1-3 Units Subcutaneous TID  Zac Hurley MD        insulin aspart (NovoLOG) injection (RAPID ACTING)  1-3 Units Subcutaneous At Bedtime Zac Hurley MD        insulin glargine (LANTUS PEN) injection 7 Units  7 Units Subcutaneous QAM  Zac Hurley MD   7 Units at 11/06/24 0802    insulin glargine (LANTUS PEN) injection 7 Units  7 Units Subcutaneous At Bedtime Zac Hurley MD   7 Units at 11/05/24 2149    losartan (COZAAR) tablet 50 mg  50 mg Oral BID Yessica Villalobos MD   50 mg at 11/06/24 0800    melatonin tablet 3 mg  3 mg Oral At Bedtime Zac Hurley MD   3 mg at 11/05/24 1934    metoprolol tartrate (LOPRESSOR) tablet 50 mg  50 mg Oral BID Yessica Villalobos MD   50 mg at 11/06/24 0800    pantoprazole (PROTONIX) EC tablet 40 mg  40 mg Oral QAM  Yessica Villalobos MD   40 mg at 11/06/24 0452    polyethylene glycol (MIRALAX) Packet 17 g  17 g Oral Daily Zac Hurley MD   17 g at 11/05/24 1935    sennosides (SENOKOT) tablet 2 tablet  2 tablet Oral Daily Zac Hurley MD   2 tablet at 11/06/24 0756    thiamine (B-1) tablet 100 mg  100 mg Oral Daily Zac Hurley MD   100 mg at 11/06/24 0800       PRN meds:  Current Facility-Administered Medications   Medication Dose Route Frequency Provider Last Rate Last Admin    acetaminophen (TYLENOL) tablet 650 mg  650 mg Oral Q4H PRN Zac Hurley MD   650 mg at 11/06/24 1113    bisacodyl (DULCOLAX) suppository 10 mg  10 mg Rectal Daily PRN Zac Hurley MD        glucose gel 15-30 g  15-30 g Oral Q15 Min PRN Zac Hurley MD        Or    dextrose 50 % injection 25-50 mL  25-50 mL Intravenous Q15 Min PRN Zac Hurley MD        Or    glucagon injection 1 mg  1 mg Subcutaneous Q15 Min PRN Zac Hurley MD        menthol (Topical Analgesic) 2.5% (BENGAY VANISHING SCENT) 2.5 % topical  "gel   Topical 4x Daily PRN Yessica Villalobos MD   Given at 11/06/24 0801    QUEtiapine (SEROquel) quarter-tab 6.25 mg  6.25 mg Oral At Bedtime PRN Zac Hurley MD   6.25 mg at 11/06/24 0205         PHYSICAL EXAM  /74 (BP Location: Right arm, Patient Position: Semi-Vargas's, Cuff Size: Adult Large)   Pulse 76   Temp 97.3  F (36.3  C) (Axillary)   Resp 18   Ht 1.753 m (5' 9\")   Wt 102 kg (224 lb 13.9 oz)   SpO2 98%   BMI 33.21 kg/m    General: in no acute distress, conversational and alert, resting comfortably in bed  HEENT: atraumatic, nares clear, conjunctiva white  Pulmonary: on room air, no acute distress  Cardiovascular: Warm, well perfused  Abdominal: soft, non-tender to palpation, non-distended  Extremities: warm peripherally, no edema in BLEs  Skin: warm, dry, without erythema, ecchymosis, or rash noted. Craniotomy site C/D/I.   MSK: Calves non-tender to palpation, R upper trap taut and ropey. - negative speeds test and Yergason.   Neuro: Conjugate gaze, speech non-dysarthric, left facial droop, nystagmus with looking leftward. Flaccid paralysis of the left upper and lower extremity. No spasticity noted in LUE/LLE testing. No clonus noted.     LABS  Results for orders placed or performed during the hospital encounter of 11/04/24 (from the past 24 hours)   Glucose by meter   Result Value Ref Range    GLUCOSE BY METER POCT 99 70 - 99 mg/dL   Glucose by meter   Result Value Ref Range    GLUCOSE BY METER POCT 104 (H) 70 - 99 mg/dL   Glucose by meter   Result Value Ref Range    GLUCOSE BY METER POCT 110 (H) 70 - 99 mg/dL   Glucose by meter   Result Value Ref Range    GLUCOSE BY METER POCT 111 (H) 70 - 99 mg/dL       ASSESSMENT AND PLAN  Carl Sierra is a 49 year old left hand dominant male with past medical history of HTN and undiagnosed GLENN, who initially presented to October 2 with hypertensive emergency, left upper extremity weakness and numbness due to right MCA ischemic stroke. He underwent right " craniectomy on October 5 for the management of cerebral edema and midline shift. Hospital course was complicated but he clinically improved and was transferred to ARU.          Admission to acute inpatient rehab: 11/4/2024   Impairment group code: 01.1 Stroke      PT, OT and SLP 60 minutes of each on a daily basis, 6x a week, for 25 days,  in addition to rehab nursing and close management of physiatrist.     Impairment of ADL's:  OT for 60 min daily, 6x a week for 25 days, to work on upper and lower body self care, dressing, toileting, bathing, energy conservation techniques with use of ADs as needed   Impairment of mobility:   PT for 60 min daily, 6x a week for 25 days, to work on strengthening, endurance buildup, transfers and most likely WC mobility.   Impairment of cognition/language/swallow:   SLP for 60 min daily, 6x a week  for cognitive evaluation and treatment strategies for higher level cognitive deficits and memory impairment   Rehab RN to administer medication, patient education on medication taking, VS monitoring, bowel regimen, glucose monitoring and wound care/surgical wound dressing changes and monitoring.      Medical Conditions  # Right MCA ischemic stroke s/p TNK  # Cerebral edema s/p decompressive craniectomy 10/5  Brain MRI also showed  1. Relatively large acute infarct in the right middle cerebral artery territory involving the insular cortex as well as portions of the frontal and parietal cortex including the pre and postcentral gyri. Additional smaller acute infarcts are also present along the cortex elsewhere in the right middle cerebral artery territory. A few small acute infarcts are present in the posterior medial right cerebellum and a few punctate acute infarcts are present in the left anterior cerebral artery territory. There is no hemorrhage or mass effect.   2. Chronic infarcts in the left brachium pontis and left cerebral peduncle.   CT angio showed  Short segment severe stenosis of  the posterior branch of the M2 segment of the right MCA.   1 cm long severe irregular stenosis of the A3 segment of the right anterior cerebral artery.   CT head was done on 10/18, which was concerning for hemorrhagic petechiae in right MCA territory with evolution of stroke. Repeat CT head ordered on 10/19, unchanged compared to previous imaging.      Completed 7 day course of [facility]   Discharging team and neurosurgery  recommended low dose caffeine or modafinil to help with alertness if needed     - continue ASA and plavix  - consider modafinil.   - continue acetaminophen-caffeine (Excedrin Tension Headache) 500-65 mg tablet daily (was started on 10/25)    - stroke secondary prevention as below  - surgical wound healing well; staples were removed 10/21  - helmet when OOB   - follow up with neurosurgery for bone flap replacement - [facility]  (Would be direct admission day prior for lumbar drain placement and bone flap replacement the following day.)  - follow up stroke neurology and PM&R as outpatient       # Left Sided Hemiplegia, Flaccid  Patient reports persistent left sided weakness as well as nighttime pain and spasms in his extremities. Examination significant for flaccid paralysis of left upper and lower extremity with complete loss of tone.    - Monitor for spasms  - Consider gabapentin      # HTN  Not managed prior to admission    - continue metoprolol 50 bid   - continue losartan 50 bid (was dosed bid for better control of morning elevation in BP and can be changed to daily dosing)  - amlodipine 10 daily      # HLD  LDL 98 on 10/3.   - continue lipitor 40mg QD  - repeat lipid panel in 3 months      # DM II  HgbA1c was 9.2 10/3  - DM education   - continue lantus 7 units bid   - continue low dose sliding scale insulin   - BG check QID  - discontinued 2am check for now   - will consider addition of  an oral agent in the following days and adjust insulin dose accordingly   - consider endo  consult pending his course   - repeat HgbA1C in 3 months      # NICM/HFrEF  EF 40 to 45% with global hypokinesis but no intracardiac shunt   - repeat TTE and follow up with cardiology in a month      # Probable undiagnosed GLENN   Family reported episodes of apnea during the night.   - continue prn O2  - formal sleep study as outpatient       # Hyponatremia, Resolving  Resolved per discharging team; it was 135 and slightly low on 11/4  - Will continue to monitor with qMon/Thurs BMP     # Normocytic Anemia   Mild and stable, likely due to chronic disease vs CHAD.   - Will continue to monitor with qMon/Thurs CBC    # Headaches  # Neck pain   # L knee pain   Headaches seem to be well controlled on tylenol. Typically starts towards the end of the day and also responded to lying flat and hydration.   -Was using lidocaine patch and bengay for neck pain as well as prn norco. Pain seems myofascial in nature with no clinical s/s of radiculopathy. Likely trigger point, at this point wants to avoid trigger point injections.   L knee pain for 20 years due to sports injury and likely early OA.   - continue tylenol 650 mg Q4H PRN   - was also on norco 0.5-1 tablet q4h prn since 10/29; was discontinued at discharge.   - reported no benefits from lidocaine so it was discontinued  - added topical bengay cream   - can consider TENS for the myofascial pain      # Insomnia   Per chart review, Seroquel made him agitated and was not beneficial so it was discontinued at discharge and per sign out from discharging team (last dose 11/2). However, on admission to ARU, patient states that Seroquel was beneficial.   - continue melatonin   - Seroquel 6.25 mg PO HS PRN   - consider ramelteon      # Obesity  BMI 33.86  - nutrition consult      # Depression  # Adjustment to disability  Worsening mood in the setting of his stroke and disability.   - rehab psych consult   - spiritual health consult   - can consider selective serotonin reuptake  inhibitor vs SNRI      # Neurogenic bowel  - Miralax one packet scheduled daily   - Senna BID, starting tmrw AM   -  Bisacodyl 10mg suppository QD PRN     # Neurogenic bladder:   - ok to use primofit  - will start using urinal during the day and possibly toileting using the commode pending his course       FEN: regular with thin. Continued B1 supplement.   DVT Prophylaxis: DAPT, lovenox 40 mg QD  GI Prophylaxis: Added protonix upon admission. On pepcid bid which can discontinued pending his symptoms.    Code: Full - confirmed upon admission   Disposition: he will WC based and will require assistance with all ADLs and mobility; home is not accessible. Will need as ramp and possibly chair lift for stairs.   ELOS/Discharge Date:  4 weeks and most likely TCU after that before discharge to home.  Rehab prognosis:  Good   Follow up Appointments on Discharge:   Outpatient PCP in 1-2 weeks.   Outpatient PM&R in 1-2 months.  Stroke neurology   Neurosurgery    Cardiology   Sleep medicine   Psychology       Angel Vargas,       I spent a total of 35 minutes face to face and coordinating care of Carl Sierra. Over 50% of my time on the unit was spent counseling the patient and /or coordinating care regarding post CVA with L hemiplegia.

## 2024-11-06 NOTE — PLAN OF CARE
Goal Outcome Evaluation:      Plan of Care Reviewed With: patient    Overall Patient Progress: no changeOverall Patient Progress: no change       Vitals/BGs: Stable vitals and BGs. No correction novolog given. Just scheduled Lantus insulin 7 units in the morning.  Orientation: Pt alert and oriented x 4.   Pain: c/o right shoulder pain a few times during the day and a headache this afternoon. Prn tylenol and prn bengay helpful with right shoulder pain. Will start lidocaine patch tonight. Scheduled excedrin in AM and prn tylenol given this afternoon with good effect.  Mobility: Liko lift with 2 staff to transfer on and off the bed. Assist of 2 to reposition in bed.  Diet: Tolerating regular/thin diet with fair appetite.  Bladder: Mixed continence. Using the urinal but he sometimes spills. Primofit at night.  Bowel: Bowel program with a supp will be started after dinner tonight.  Skin/Lines: Scalp incision healing well, JUANITO.  Safety: Helmet when out of bed. Alarms on at all times.   Update 7pm-Pt had a positive result from bowel program. He had a medium bm on the purple shower chair.

## 2024-11-06 NOTE — PLAN OF CARE
Goal Outcome Evaluation:      Plan of Care Reviewed With: patient, significant other    Overall Patient Progress: no changeOverall Patient Progress: no change Patient is alert and oriented x 4 can be forgetful. A-2 emile steramon. Regular/thin diet. Incontinent of bladder uses primo fit at night. No BM this shift last Bm 11/3. Patient denied headache, dizziness, CP or SOB. Mild posterior neck and right shoulder pain managed with PRN and scheduled medication and was helpful. VS WDL no care concern at this time. Turn and repositioning per plan of care. PCD applied on bilateral calf per order. Call light is with in reach alarm is on.

## 2024-11-06 NOTE — PLAN OF CARE
Discharge Planner Post-Acute Rehab SLP:     Discharge Plan: TCU vs home with ongoing SLP    Precautions: fall, crani/helmet, impulsivity    Current Status:  Hearing: WFL  Vision: glasses  Communication: Mild dysarthria; speech intelligibility 80% in conversation  Cognition: Moderate cogntiive-communication impairment in areas of attention, memory, executive function and visuospatial skills.   Swallow: Swallow eval 11/6 - continue regular diet and thin liquids - set up assist as needed, reminder for upright position/chair position is ideal, small bites/sips, slow rate, and alternate solids and liquids. No further swallow goals at this time as pt is independently utilizing strategies for safe and efficient oral intake.     Assessment: Mild oropharyngeal dysphagia, pt is independently utilizing strategies for safe and efficient PO intake. Continue a regular diet and thin liquids with general swallow strategies of upright position, small bites/sips, slow rate of intake, and alternate solids and liquids using a tongue sweep as needed to clear oral cavity. Swallow- evaluation only, no need for additional skilled services at this time. Continue SLP services for dysarthria and cog/ling deficits.      Other Barriers to Discharge (Family Training, etc): TBD

## 2024-11-06 NOTE — PLAN OF CARE
Discharge Plan: home w/ assist. HHPT    Precautions: fall, crani, helmet OOB. Do not leave alone EOB/commode.    Current Status:  Bed Mobility: Ax2 rolling and sup<>sit  Transfer: Liko Ax2  Gait: unable, unsafe  Stairs: unable, unsafe  Balance: Needs maxAx1-2 for static sitting     Outcome Measures:   PASS    11/5: 0/36  Rice   11/5: 0/56    Assessment: Initiated FES, see below. Improved IND and sequencing w/ rolling compared to yesterday.        Other Barriers to Discharge (DME, Family Training, etc):   1 LUPE (threshold) + 15STI to access bedroom/bathroom. Option for main floor living in [facility].     DME: pending progress  ________________________  Skilled set up on :  Pt dependent for proper placement of electrodes on L ant tib and gastroc (plan to add L quad, hamstring next session) for optimum muscle contraction, safe positioning on w/c within frame and cushion for prevention of skin breakdown, feet secured to foot pedals, w/c secured to  w/ Q-straints.  Passive motion assessed to ensure proper positioning.    Strap added around thighs to optimize LLE position in sagittal plane.    Pt performed 15 minutes of active FES ergometry with 100% stimulation applied to above muscles at 35 rpm with 0.5 Nm resistance.  This PT adjusted e-stim and cycling parameters in real-time to ensure palpable muscle contractions throughout session.      Changes in parameters that were part of this treatment:   -resistance  -pulse width, frequency  -amplitude  -pedal speed      Functional outcomes from this intervention include:   -improved sensory awareness and proprioception  -improved muscle strength  -improved motor coordination    Session Summary:   -Average Power: 1.1W  -Asymmetry: 0%  -Active Minutes: 6:42

## 2024-11-06 NOTE — PROGRESS NOTES
Discharge Planner Post-Acute Rehab OT:      Discharge Plan: Home with assistance vs TCU     Precautions: Falls, Craniectomy - helmet OOB, LUE/LLE hemiparesis, L neglect.  *Safe sitting plan     Current Status:  ADLs:  Mobility: A x 2 bed mobility. Lift transfer. Wheelchair based in Broda chair.   Grooming: Min A seated.  Dressing: UB A x 2 seated in chair. LB A x 2 supine.   Bathing: liko lift Ax2 to/from purple shower chair/commode. Assistance with washing/rinsing/drying 6/10 body parts.   Toileting: Liko lift Ax2 to/from purple dependent shower chair/commode. Dependent hygiene/clothing.  IADLs: PLOF IND. Recommend assist.  Vision/Cognition: Full time corrective lenses. L neglect, possible peripheral field deficits. Moderate cognitive deficits attention, recall, comprehension, problem solving.      Assessment: Completed full shower and ADLs. Pt tolerated sitting in purple dependent shower chair/commode for shower and toileting with recline in chair, LUE  supported with gel wedge, and pillow behind his head. Requested for bowel program from MD. Pt c/o of R shoulder pain, notified MD.      Other Barriers to Discharge (DME, Family Training, etc):   Home set up: 1 LUPE. 16 stairs to access bedroom/bathroom. Can stay on main level with bedroom/bathroom but no shower.   Caregiver support: Partner working remotely, involved and present on unit.   Equipment needs pending progress.

## 2024-11-07 ENCOUNTER — APPOINTMENT (OUTPATIENT)
Dept: OCCUPATIONAL THERAPY | Facility: CLINIC | Age: 49
DRG: 057 | End: 2024-11-07
Attending: PHYSICAL MEDICINE & REHABILITATION
Payer: COMMERCIAL

## 2024-11-07 ENCOUNTER — APPOINTMENT (OUTPATIENT)
Dept: SPEECH THERAPY | Facility: CLINIC | Age: 49
DRG: 057 | End: 2024-11-07
Attending: PHYSICAL MEDICINE & REHABILITATION
Payer: COMMERCIAL

## 2024-11-07 ENCOUNTER — APPOINTMENT (OUTPATIENT)
Dept: PHYSICAL THERAPY | Facility: CLINIC | Age: 49
DRG: 057 | End: 2024-11-07
Attending: PHYSICAL MEDICINE & REHABILITATION
Payer: COMMERCIAL

## 2024-11-07 LAB
ANION GAP SERPL CALCULATED.3IONS-SCNC: 10 MMOL/L (ref 7–15)
BUN SERPL-MCNC: 6.2 MG/DL (ref 6–20)
CALCIUM SERPL-MCNC: 9.2 MG/DL (ref 8.8–10.4)
CHLORIDE SERPL-SCNC: 105 MMOL/L (ref 98–107)
CREAT SERPL-MCNC: 0.69 MG/DL (ref 0.67–1.17)
EGFRCR SERPLBLD CKD-EPI 2021: >90 ML/MIN/1.73M2
ERYTHROCYTE [DISTWIDTH] IN BLOOD BY AUTOMATED COUNT: 16 % (ref 10–15)
GLUCOSE BLDC GLUCOMTR-MCNC: 118 MG/DL (ref 70–99)
GLUCOSE BLDC GLUCOMTR-MCNC: 135 MG/DL (ref 70–99)
GLUCOSE BLDC GLUCOMTR-MCNC: 135 MG/DL (ref 70–99)
GLUCOSE BLDC GLUCOMTR-MCNC: 98 MG/DL (ref 70–99)
GLUCOSE SERPL-MCNC: 104 MG/DL (ref 70–99)
HCO3 SERPL-SCNC: 24 MMOL/L (ref 22–29)
HCT VFR BLD AUTO: 33.8 % (ref 40–53)
HGB BLD-MCNC: 11.1 G/DL (ref 13.3–17.7)
MCH RBC QN AUTO: 26 PG (ref 26.5–33)
MCHC RBC AUTO-ENTMCNC: 32.8 G/DL (ref 31.5–36.5)
MCV RBC AUTO: 79 FL (ref 78–100)
PLATELET # BLD AUTO: 246 10E3/UL (ref 150–450)
POTASSIUM SERPL-SCNC: 3.8 MMOL/L (ref 3.4–5.3)
RBC # BLD AUTO: 4.27 10E6/UL (ref 4.4–5.9)
SODIUM SERPL-SCNC: 139 MMOL/L (ref 135–145)
WBC # BLD AUTO: 4.7 10E3/UL (ref 4–11)

## 2024-11-07 PROCEDURE — 250N000013 HC RX MED GY IP 250 OP 250 PS 637: Performed by: PHYSICAL MEDICINE & REHABILITATION

## 2024-11-07 PROCEDURE — 85041 AUTOMATED RBC COUNT: CPT

## 2024-11-07 PROCEDURE — 97530 THERAPEUTIC ACTIVITIES: CPT | Mod: GP

## 2024-11-07 PROCEDURE — 36415 COLL VENOUS BLD VENIPUNCTURE: CPT

## 2024-11-07 PROCEDURE — 97112 NEUROMUSCULAR REEDUCATION: CPT | Mod: GO | Performed by: OCCUPATIONAL THERAPIST

## 2024-11-07 PROCEDURE — 97130 THER IVNTJ EA ADDL 15 MIN: CPT | Mod: GN | Performed by: SPEECH-LANGUAGE PATHOLOGIST

## 2024-11-07 PROCEDURE — 128N000003 HC R&B REHAB

## 2024-11-07 PROCEDURE — 99232 SBSQ HOSP IP/OBS MODERATE 35: CPT | Performed by: PHYSICAL MEDICINE & REHABILITATION

## 2024-11-07 PROCEDURE — 250N000011 HC RX IP 250 OP 636

## 2024-11-07 PROCEDURE — 82435 ASSAY OF BLOOD CHLORIDE: CPT

## 2024-11-07 PROCEDURE — 82374 ASSAY BLOOD CARBON DIOXIDE: CPT

## 2024-11-07 PROCEDURE — 97112 NEUROMUSCULAR REEDUCATION: CPT | Mod: GP

## 2024-11-07 PROCEDURE — 250N000013 HC RX MED GY IP 250 OP 250 PS 637

## 2024-11-07 PROCEDURE — 80048 BASIC METABOLIC PNL TOTAL CA: CPT

## 2024-11-07 PROCEDURE — 97129 THER IVNTJ 1ST 15 MIN: CPT | Mod: GN

## 2024-11-07 PROCEDURE — 97130 THER IVNTJ EA ADDL 15 MIN: CPT | Mod: GN

## 2024-11-07 PROCEDURE — 85014 HEMATOCRIT: CPT

## 2024-11-07 PROCEDURE — 97129 THER IVNTJ 1ST 15 MIN: CPT | Mod: GN | Performed by: SPEECH-LANGUAGE PATHOLOGIST

## 2024-11-07 RX ADMIN — Medication: at 06:58

## 2024-11-07 RX ADMIN — FAMOTIDINE 20 MG: 20 TABLET ORAL at 08:16

## 2024-11-07 RX ADMIN — METOPROLOL TARTRATE 50 MG: 50 TABLET, FILM COATED ORAL at 08:16

## 2024-11-07 RX ADMIN — LIDOCAINE 1 PATCH: 4 PATCH TOPICAL at 20:47

## 2024-11-07 RX ADMIN — POLYETHYLENE GLYCOL 3350 17 G: 17 POWDER, FOR SOLUTION ORAL at 20:47

## 2024-11-07 RX ADMIN — ASPIRIN 81 MG CHEWABLE TABLET 81 MG: 81 TABLET CHEWABLE at 08:16

## 2024-11-07 RX ADMIN — AMLODIPINE BESYLATE 10 MG: 10 TABLET ORAL at 08:15

## 2024-11-07 RX ADMIN — ACETAMINOPHEN 650 MG: 325 TABLET, FILM COATED ORAL at 04:18

## 2024-11-07 RX ADMIN — INSULIN GLARGINE 7 UNITS: 100 INJECTION, SOLUTION SUBCUTANEOUS at 22:26

## 2024-11-07 RX ADMIN — PANTOPRAZOLE SODIUM 40 MG: 40 TABLET, DELAYED RELEASE ORAL at 06:14

## 2024-11-07 RX ADMIN — LOSARTAN POTASSIUM 50 MG: 50 TABLET, FILM COATED ORAL at 08:16

## 2024-11-07 RX ADMIN — ACETAMINOPHEN 650 MG: 325 TABLET, FILM COATED ORAL at 09:54

## 2024-11-07 RX ADMIN — FAMOTIDINE 20 MG: 20 TABLET ORAL at 20:48

## 2024-11-07 RX ADMIN — INSULIN GLARGINE 7 UNITS: 100 INJECTION, SOLUTION SUBCUTANEOUS at 08:14

## 2024-11-07 RX ADMIN — Medication 6.25 MG: at 20:48

## 2024-11-07 RX ADMIN — CLOPIDOGREL BISULFATE 75 MG: 75 TABLET ORAL at 08:16

## 2024-11-07 RX ADMIN — SENNOSIDES 2 TABLET: 8.6 TABLET, FILM COATED ORAL at 08:16

## 2024-11-07 RX ADMIN — LOSARTAN POTASSIUM 50 MG: 50 TABLET, FILM COATED ORAL at 20:47

## 2024-11-07 RX ADMIN — THIAMINE HCL TAB 100 MG 100 MG: 100 TAB at 08:16

## 2024-11-07 RX ADMIN — ATORVASTATIN CALCIUM 40 MG: 40 TABLET, FILM COATED ORAL at 20:47

## 2024-11-07 RX ADMIN — Medication 5 MG: at 20:59

## 2024-11-07 RX ADMIN — ENOXAPARIN SODIUM 40 MG: 40 INJECTION SUBCUTANEOUS at 15:26

## 2024-11-07 RX ADMIN — ACETAMINOPHEN, CAFFEINE 1 TABLET: 500; 65 TABLET, FILM COATED ORAL at 08:16

## 2024-11-07 RX ADMIN — ACETAMINOPHEN 650 MG: 325 TABLET, FILM COATED ORAL at 20:48

## 2024-11-07 RX ADMIN — METOPROLOL TARTRATE 50 MG: 50 TABLET, FILM COATED ORAL at 20:47

## 2024-11-07 RX ADMIN — ACETAMINOPHEN 650 MG: 325 TABLET, FILM COATED ORAL at 15:29

## 2024-11-07 RX ADMIN — BISACODYL 10 MG: 10 SUPPOSITORY RECTAL at 18:10

## 2024-11-07 NOTE — CONSULTS
"PSYCHOLOGY CONSULTATION - INITIAL NOTE  Start Time: 1400  Stop Time: 1430  Session Duration in Minutes:    REASON FOR CONSULTATION: Psychology consulted to assess and provide interventions for adjustment to new functional status as well as history of depression.     BACKGROUND PER EMR: Carl Sierra is a 49 year old left hand dominant male with past medical history of HTN and undiagnosed GLENN, who initially presented to October 2 with hypertensive emergency, left upper extremity weakness and numbness due to right MCA ischemic stroke. He underwent right craniectomy on October 5 for the management of cerebral edema and midline shift. Hospital course was complicated but he clinically improved and was transferred to ARU.     SUBJECTIVE: Met with Carl and his partner, Renea.  We briefly discussed his history of depression, however, he denied any significant symptoms of depression at this time.  He stated he was anxious about the future.  We discussed the importance of switching between \"big picture\" viewing and \"taking things day by day.\"  We need to be able to move between these two ideas.     Carl endorsed some sleeping difficulties, he now has to sleep on his back, and he previously is a side or stomach sleeper.  We discussed how he may bring in some things that remind him of home such as a blanket.  He was encouraged to reach out to his team to see if a weighted blanket is appropriate (I'm concerned about positioning).     He endorsed some pain.      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  He is eager to return to work.      We talked a bit about the mechanisms of injury (including primary and secondary injuries, along with recovery /trajectory in rehabilitation).  Basic stroke education was provided.     OBJECTIVE: Carl was laying back in his ARU hospital bed.  Spontaneous movements were noted " "in his upper extremities.  Some dysarthria was noted, and he responded well to asking him to repeat himself for my comprehension. He appeared to follow conversation without significant difficulty, although Renea did repeat some more complex things. Thoughts were goal-directed and linear.  No SI, HI, dahlia, or confusion noted.     ASSESSMENT: Carl appears to be demonstrating relatively expected emotions following a major medical event. He does endorse some anxiety and feeling overwhelmed when looking at the \"big picture\" however, he responds well to cognitive re frames.  I am a bit concerned that he will attempt to return to work too quickly, and potentially more education about this will need to be provided.     DIAGNOSIS:  Adjustment disorder with anxious mood  History of depression    RECOMMENDATION/PLAN:   Will continue to meet with Carl weekly using CBT/ACT techniques throughout his ARU stay.     Please feel free to call if urgent concerns arise prior to the next follow-up session.    Susan Elias PsyD, EDWAR  Clinical Neuropsychologist  "

## 2024-11-07 NOTE — PROGRESS NOTES
Discharge Planner Post-Acute Rehab OT:      Discharge Plan: Home with assistance vs TCU     Precautions: Falls, Craniectomy - helmet OOB, LUE/LLE hemiparesis, L neglect.  *Safe sitting plan     Current Status:  ADLs:  Mobility: A x 2 bed mobility. Lift transfer. Wheelchair based in Broda chair.   Grooming: Min A seated.  Dressing: UB A x 2 seated in chair. LB A x 2 supine.   Bathing: liko lift Ax2 to/from purple shower chair/commode. Assistance with washing/rinsing/drying 6/10 body parts.   Toileting: Liko lift Ax2 to/from purple dependent shower chair/commode. Dependent hygiene/clothing.  IADLs: PLOF IND. Recommend assist.  Vision/Cognition: Full time corrective lenses. L neglect, possible peripheral field deficits. Moderate cognitive deficits attention, recall, comprehension, problem solving.      Assessment: Initiated FES for LUE neuro re-education and sensorimotor skills; refer to care plan note for further details. Utilized TIS W/C for FES cycling (borrowed from michelle Summers to use for FES sessions).      Other Barriers to Discharge (DME, Family Training, etc):   Home set up: 1 LUPE. 16 stairs to access bedroom/bathroom. Can stay on main level with bedroom/bathroom but no shower.   Caregiver support: Partner working remotely, involved and present on unit.   Equipment needs pending progress.

## 2024-11-07 NOTE — PROGRESS NOTES
[facility]   Acute Rehabilitation Unit  Daily Progress Note    INTERVAL HISTORY  Notes and labs reviewed over past 24 hours. No acute overnight events reported.  Therapies have been going well. He has had persistent right sided neck pain. No change in left sided weakness.     ROS: 10 point ROS was assessed and was negative unless otherwise stated in HPI    Functional  OT: 11/6   ADLs:  Mobility: A x 2 bed mobility. Lift transfer. Wheelchair based in Broda chair.   Grooming: Min A seated.  Dressing: UB A x 2 seated in chair. LB A x 2 supine.   Bathing: liko lift Ax2 to/from purple shower chair/commode. Assistance with washing/rinsing/drying 6/10 body parts.   Toileting: Liko lift Ax2 to/from purple dependent shower chair/commode. Dependent hygiene/clothing.  IADLs: PLOF IND. Recommend assist.  Vision/Cognition: Full time corrective lenses. L neglect, possible peripheral field deficits. Moderate cognitive deficits attention, recall, comprehension, problem solving.     PT: 11/7  Bed Mobility: Ax2 rolling and sup<>sit  Transfer: Liko Ax2  Gait: unable, unsafe  Stairs: unable, unsafe  Balance: Needs maxAx1-2 for static sitting     SLP: 11/7  Hearing: WFL  Vision: glasses  Communication: Mild dysarthria; speech intelligibility 80% in conversation  Cognition: Moderate cogntiive-communication impairment in areas of attention, memory, executive function and visuospatial skills.   Swallow: Swallow eval 11/6 - continue regular diet and thin liquids - set up assist as needed, reminder for upright position/chair position is ideal, small bites/sips, slow rate, and alternate solids and liquids. No further swallow goals at this time as pt is independently utilizing strategies for safe and efficient oral intake.       MEDICATIONS  Scheduled meds  Current Facility-Administered Medications   Medication Dose Route Frequency Provider Last Rate Last Admin    acetaminophen-caffeine (EXCEDRIN TENSION  HEADACHE) 500-65 MG tablet 1 tablet  1 tablet Oral Daily Yessica Villalobos MD   1 tablet at 11/07/24 0816    amLODIPine (NORVASC) tablet 10 mg  10 mg Oral Daily Yessica Villalobos MD   10 mg at 11/07/24 0815    aspirin (ASA) chewable tablet 81 mg  81 mg Oral Daily Zac Hurley MD   81 mg at 11/07/24 0816    atorvastatin (LIPITOR) tablet 40 mg  40 mg Oral QPM Zac Hurley MD   40 mg at 11/06/24 2112    bisacodyl (DULCOLAX) suppository 10 mg  10 mg Rectal Daily Angel Vargas DO   10 mg at 11/06/24 1807    clopidogrel (PLAVIX) tablet 75 mg  75 mg Oral Daily Zac Hurley MD   75 mg at 11/07/24 0816    enoxaparin ANTICOAGULANT (LOVENOX) injection 40 mg  40 mg Subcutaneous Q24H Zac Hurley MD   40 mg at 11/06/24 1459    famotidine (PEPCID) tablet 20 mg  20 mg Oral BID Zac Hurley MD   20 mg at 11/07/24 0816    insulin aspart (NovoLOG) injection (RAPID ACTING)  1-3 Units Subcutaneous TID  Zac Hurley MD        insulin aspart (NovoLOG) injection (RAPID ACTING)  1-3 Units Subcutaneous At Bedtime Zac Hurley MD        insulin glargine (LANTUS PEN) injection 7 Units  7 Units Subcutaneous QAM AC Zac Hurley MD   7 Units at 11/07/24 0814    insulin glargine (LANTUS PEN) injection 7 Units  7 Units Subcutaneous At Bedtime Zac Hurley MD   7 Units at 11/06/24 2232    Lidocaine (LIDOCARE) 4 % Patch 1 patch  1 patch Transdermal Q24h Angel Vargas DO   1 patch at 11/06/24 1952    losartan (COZAAR) tablet 50 mg  50 mg Oral BID Yessica Villalobos MD   50 mg at 11/07/24 0816    melatonin tablet 3 mg  3 mg Oral At Bedtime Zac Hurley MD   3 mg at 11/06/24 1952    metoprolol tartrate (LOPRESSOR) tablet 50 mg  50 mg Oral BID Yessica Villalobos MD   50 mg at 11/07/24 0816    pantoprazole (PROTONIX) EC tablet 40 mg  40 mg Oral QAM AC Yessica Villalobos MD   40 mg at 11/07/24 0614    polyethylene glycol (MIRALAX) Packet 17 g  17 g Oral Daily Zac Hurley MD   17 g at 11/06/24 1952     "sennosides (SENOKOT) tablet 2 tablet  2 tablet Oral Daily Zac Hurley MD   2 tablet at 11/07/24 0816    thiamine (B-1) tablet 100 mg  100 mg Oral Daily Zac Hurley MD   100 mg at 11/07/24 0816       PRN meds:  Current Facility-Administered Medications   Medication Dose Route Frequency Provider Last Rate Last Admin    acetaminophen (TYLENOL) tablet 650 mg  650 mg Oral Q4H PRN Zac Hurley MD   650 mg at 11/07/24 0954    glucose gel 15-30 g  15-30 g Oral Q15 Min PRN Zac Hurley MD        Or    dextrose 50 % injection 25-50 mL  25-50 mL Intravenous Q15 Min PRN Zac Hurley MD        Or    glucagon injection 1 mg  1 mg Subcutaneous Q15 Min PRN Zac Hurley MD        menthol (Topical Analgesic) 2.5% (BENGAY VANISHING SCENT) 2.5 % topical gel   Topical 4x Daily PRN Yessica Villalobos MD   Given at 11/07/24 0658    QUEtiapine (SEROquel) quarter-tab 6.25 mg  6.25 mg Oral At Bedtime PRN Zac Hurley MD   6.25 mg at 11/06/24 0205         PHYSICAL EXAM  /74 (BP Location: Right arm)   Pulse 88   Temp 98.1  F (36.7  C) (Oral)   Resp 16   Ht 1.753 m (5' 9\")   Wt 102 kg (224 lb 13.9 oz)   SpO2 99%   BMI 33.21 kg/m    General: in no acute distress, conversational and alert, resting comfortably in chair  HEENT: atraumatic, nares clear, conjunctiva white  Pulmonary: on room air, no acute distress  Cardiovascular: Warm, well perfused  Abdominal: soft, non-tender to palpation, non-distended  Extremities: warm peripherally, no edema in BLEs  Skin: warm, dry, without erythema, ecchymosis, or rash noted. Craniotomy site C/D/I.   MSK: Calves non-tender to palpation, R upper trap taut and ropey. - negative speeds test and Yergason.   Neuro: Conjugate gaze, speech non-dysarthric, left facial droop, nystagmus with looking leftward. Flaccid paralysis of the left upper and lower extremity. No spasticity noted in LUE/LLE testing. No clonus noted.     LABS  Results for orders placed or performed during the " hospital encounter of 11/04/24 (from the past 24 hours)   Glucose by meter   Result Value Ref Range    GLUCOSE BY METER POCT 101 (H) 70 - 99 mg/dL   Glucose by meter   Result Value Ref Range    GLUCOSE BY METER POCT 179 (H) 70 - 99 mg/dL   Basic metabolic panel   Result Value Ref Range    Sodium 139 135 - 145 mmol/L    Potassium 3.8 3.4 - 5.3 mmol/L    Chloride 105 98 - 107 mmol/L    Carbon Dioxide (CO2) 24 22 - 29 mmol/L    Anion Gap 10 7 - 15 mmol/L    Urea Nitrogen 6.2 6.0 - 20.0 mg/dL    Creatinine 0.69 0.67 - 1.17 mg/dL    GFR Estimate >90 >60 mL/min/1.73m2    Calcium 9.2 8.8 - 10.4 mg/dL    Glucose 104 (H) 70 - 99 mg/dL   CBC with platelets   Result Value Ref Range    WBC Count 4.7 4.0 - 11.0 10e3/uL    RBC Count 4.27 (L) 4.40 - 5.90 10e6/uL    Hemoglobin 11.1 (L) 13.3 - 17.7 g/dL    Hematocrit 33.8 (L) 40.0 - 53.0 %    MCV 79 78 - 100 fL    MCH 26.0 (L) 26.5 - 33.0 pg    MCHC 32.8 31.5 - 36.5 g/dL    RDW 16.0 (H) 10.0 - 15.0 %    Platelet Count 246 150 - 450 10e3/uL   Glucose by meter   Result Value Ref Range    GLUCOSE BY METER POCT 98 70 - 99 mg/dL   Glucose by meter   Result Value Ref Range    GLUCOSE BY METER POCT 135 (H) 70 - 99 mg/dL       ASSESSMENT AND PLAN  Carl Sierra is a 49 year old left hand dominant male with past medical history of HTN and undiagnosed GLENN, who initially presented to October 2 with hypertensive emergency, left upper extremity weakness and numbness due to right MCA ischemic stroke. He underwent right craniectomy on October 5 for the management of cerebral edema and midline shift. Hospital course was complicated but he clinically improved and was transferred to ARU.     Admission to acute inpatient rehab: 11/4/2024   Impairment group code: 01.1 Stroke      PT, OT and SLP 60 minutes of each on a daily basis, 6x a week, for 25 days,  in addition to rehab nursing and close management of physiatrist.     Impairment of ADL's:  OT for 60 min daily, 6x a week for 25 days, to work on upper  and lower body self care, dressing, toileting, bathing, energy conservation techniques with use of ADs as needed   Impairment of mobility:   PT for 60 min daily, 6x a week for 25 days, to work on strengthening, endurance buildup, transfers and most likely WC mobility.   Impairment of cognition/language/swallow:   SLP for 60 min daily, 6x a week  for cognitive evaluation and treatment strategies for higher level cognitive deficits and memory impairment   Rehab RN to administer medication, patient education on medication taking, VS monitoring, bowel regimen, glucose monitoring and wound care/surgical wound dressing changes and monitoring.      Medical Conditions  # Right MCA ischemic stroke s/p TNK  # Cerebral edema s/p decompressive craniectomy 10/5  Brain MRI also showed  1. Relatively large acute infarct in the right middle cerebral artery territory involving the insular cortex as well as portions of the frontal and parietal cortex including the pre and postcentral gyri. Additional smaller acute infarcts are also present along the cortex elsewhere in the right middle cerebral artery territory. A few small acute infarcts are present in the posterior medial right cerebellum and a few punctate acute infarcts are present in the left anterior cerebral artery territory. There is no hemorrhage or mass effect.   2. Chronic infarcts in the left brachium pontis and left cerebral peduncle.   CT angio showed  Short segment severe stenosis of the posterior branch of the M2 segment of the right MCA.   1 cm long severe irregular stenosis of the A3 segment of the right anterior cerebral artery.   CT head was done on 10/18, which was concerning for hemorrhagic petechiae in right MCA territory with evolution of stroke. Repeat CT head ordered on 10/19, unchanged compared to previous imaging.      Completed 7 day course of [facility]   Discharging team and neurosurgery  recommended low dose caffeine or modafinil to help with alertness if  needed     - continue ASA and plavix  - consider modafinil.   - continue acetaminophen-caffeine (Excedrin Tension Headache) 500-65 mg tablet daily (was started on 10/25)    - stroke secondary prevention as below  - surgical wound healing well; staples were removed 10/21  - helmet when OOB   - follow up with neurosurgery for bone flap replacement - [facility]  (Would be direct admission day prior for lumbar drain placement and bone flap replacement the following day.)  - follow up stroke neurology and PM&R as outpatient       # Left Sided Hemiplegia, Flaccid  Patient reports persistent left sided weakness as well as nighttime pain and spasms in his extremities. Examination significant for flaccid paralysis of left upper and lower extremity with complete loss of tone.    - Monitor for spasms  - Consider gabapentin      # HTN  Not managed prior to admission    - continue metoprolol 50 bid   - continue losartan 50 bid (was dosed bid for better control of morning elevation in BP and can be changed to daily dosing)  - amlodipine 10 daily      # HLD  LDL 98 on 10/3.   - continue lipitor 40mg QD  - repeat lipid panel in 3 months      # DM II  HgbA1c was 9.2 10/3  - DM education   - continue lantus 7 units bid   - continue low dose sliding scale insulin   - BG check QID  - discontinued 2am check for now   - will consider addition of  an oral agent in the following days and adjust insulin dose accordingly   - consider endo consult pending his course   - repeat HgbA1C in 3 months      # NICM/HFrEF  EF 40 to 45% with global hypokinesis but no intracardiac shunt   - repeat TTE and follow up with cardiology in a month      # Probable undiagnosed GLENN   Family reported episodes of apnea during the night.   - continue prn O2  - formal sleep study as outpatient       # Hyponatremia, Resolving  Resolved per discharging team; it was 135 and slightly low on 11/4  - Will continue to monitor with qMon/Thurs BMP     #  Normocytic Anemia   Mild and stable, likely due to chronic disease vs CHAD.   - Will continue to monitor with qMon/Thurs CBC    # Headaches  # Neck pain   # L knee pain   Headaches seem to be well controlled on tylenol. Typically starts towards the end of the day and also responded to lying flat and hydration.   -Was using lidocaine patch and bengay for neck pain as well as prn norco. Pain seems myofascial in nature with no clinical s/s of radiculopathy. Likely trigger point, at this point wants to avoid trigger point injections.   L knee pain for 20 years due to sports injury and likely early OA.   - continue tylenol 650 mg Q4H PRN   - was also on norco 0.5-1 tablet q4h prn since 10/29; was discontinued at discharge.   - reported no benefits from lidocaine so it was discontinued  - added topical bengay cream   - can consider TENS for the myofascial pain      # Insomnia   Per chart review, Seroquel made him agitated and was not beneficial so it was discontinued at discharge and per sign out from discharging team (last dose 11/2). However, on admission to ARU, patient states that Seroquel was beneficial.   - continue melatonin   - Seroquel 6.25 mg PO HS PRN   - consider ramelteon      # Obesity  BMI 33.86  - nutrition consult      # Depression  # Adjustment to disability  Worsening mood in the setting of his stroke and disability.   - rehab psych consult   - spiritual health consult   - can consider selective serotonin reuptake inhibitor vs SNRI      # Neurogenic bowel  - Miralax one packet scheduled daily   - Senna BID, starting tmrw AM   -  Bisacodyl 10mg suppository QD PRN     # Neurogenic bladder:   - ok to use primofit  - will start using urinal during the day and possibly toileting using the commode pending his course       FEN: regular with thin. Continued B1 supplement.   DVT Prophylaxis: DAPT, lovenox 40 mg QD  GI Prophylaxis: Added protonix upon admission. On pepcid bid which can discontinued pending his  symptoms.    Code: Full - confirmed upon admission   Disposition: he will WC based and will require assistance with all ADLs and mobility; home is not accessible. Will need as ramp and possibly chair lift for stairs.   ELOS/Discharge Date:  4 weeks and most likely TCU after that before discharge to home.  Rehab prognosis:  Good   Follow up Appointments on Discharge:   Outpatient PCP in 1-2 weeks.   Outpatient PM&R in 1-2 months.  Stroke neurology   Neurosurgery    Cardiology   Sleep medicine   Psychology       Ash Velez MS3          Physician Attestation   I, Angel Vargas DO, was present with the medical student who participated in the service and in the documentation of the note.  I have verified the history and personally performed the physical exam and medical decision making.  I agree with the assessment and plan of care as documented in the note.        Please see A&P for additional details of medical decision making.    I have personally reviewed the following data over the past 24 hrs:    4.7  \   11.1 (L)   / 246     139 105 6.2 /  135 (H)   3.8 24 0.69 \         Angel Vargas DO  Date of Service (when I saw the patient): 11/07/24

## 2024-11-07 NOTE — PROGRESS NOTES
Helmet adjustments made to current helmet. I have ordered a medium helmet and will provide it as soon as it arrives in office.   KUSH Greenfield.

## 2024-11-07 NOTE — PLAN OF CARE
Discharge Plan: home w/ assist. HHPT    Precautions: fall, crani, helmet OOB. Do not leave alone EOB/commode.    Current Status:  Bed Mobility: Ax2 rolling and sup<>sit  Transfer: Liko Ax2  Gait: unable, unsafe  Stairs: unable, unsafe  Balance: Needs maxAx1-2 for static sitting     Outcome Measures:   PASS    11/5: 0/36  Rice   11/5: 0/56    Assessment: Treatment emphasis on encoraging L gaze, R weight shift and R trunk elongation to reduce L lateropulsion, using fishing task for saliency. Pt demonstrates R LE abduction in sitting indicating L lateropulsion tendency.    Other Barriers to Discharge (DME, Family Training, etc):   1 LUPE (threshold) + 15STI to access bedroom/bathroom. Option for main floor living in [facility].     DME: pending progress

## 2024-11-07 NOTE — PLAN OF CARE
"Discharge Planner Post-Acute Rehab SLP:      Discharge Plan: TCU vs home with ongoing SLP     Precautions: fall, crani/helmet, impulsivity     Current Status:  Hearing: WFL  Vision: glasses  Communication: Mild dysarthria; speech intelligibility 80% in conversation  Cognition: Moderate cogntiive-communication impairment in areas of attention, memory, executive function and visuospatial skills.   Swallow: Swallow eval 11/6 - continue regular diet and thin liquids - set up assist as needed, reminder for upright position/chair position is ideal, small bites/sips, slow rate, and alternate solids and liquids. No further swallow goals at this time as pt is independently utilizing strategies for safe and efficient oral intake.      Assessment: Patient's spouse present and patient up in chair upon SLP arrival. Provided further education about results of RBANS standardized cognitive testing results. Patient participated in LUCERO subtest telling time and completed with 80% accuracy due to left neglect (e.g. read 7:15 as \"3:15). Patient participated in visual scanning/cancellation task and needed mod cues to scan left and with use of visual anchor on left to locate all targets. Note organized pattern of cancellation at times. Patient participated in verbal/rehearsal strategy for recall of visual scene. Patient benefitted from red anchor line on left side of scene. Patient verbalized picture scene with good accuracy. Patient recalled details with 80% accuracy with no delay or distractor.        Other Barriers to Discharge (Family Training, etc): TBD                        "

## 2024-11-07 NOTE — PROGRESS NOTES
Individualized Overall Plan Of Care (IOPOC)      Rehab diagnosis/Impairment Group Code: 01.1 stroke (l body involvement, r brain): right mca stroke requiring right decompressive craniectomy in setting of hypertensive emergency  Acute ischemic right mca stroke (h)     Expected functional outcome: reach a level of mod I     Clinical Impression Comments: L hemiplegia post CVA s/p decompressive craniectomy in setting of hypertensive emergency    Mobility: Pt presents below baseline following R MCA stroke and small posterior cerebellar stroke, subsequent R craniectomy d/t cerebral edema and midline shift. Pt previously IND w/ all mobility and ADLs. PASS score indicative of pt w/c based at discharge and expect will need physical assist w/ transfers and ADLs pending progress. Need to further assess if L lateropulsion vs L inattention w/ dense hemiparesis and trunk weakness. Pt does have baseline L knee injury (PCL/MCL ~20 years ago). Will benefit from skilled physical therapy to promote improved functional IND and decreased caregiver burden. ELOS 3-4 weeks.    ADL: Pt is a 49 yr old male admitted to IP ARU following hospitalization for a R MCA stroke. Pt is below functional baseline for ADLs due to hemiparesis, L neglect, impaired cognition, and impaired mobility. Goals to maximize IND and safety ADLs and functional mobility. Anticipating extended rehab course to reach a safe functional level to return home, anticipating need for increased physical assistance and home modifications for discharge home from ARU. ELOS 4 weeks.    Communication/Cognition/Swallow: Pt seen for clinical swallow evaluation during lunch meal. His s/o is present and provided set up assist for self feeding as pt is left handed with current left handed deficits. Oral mech is notable for reduced L sided labial reduced ROM. Speech is mildly dysarthric and pt reports a nasal quality. Pt consumed all PO trials during meal without overt s/sx of aspiration.  Pt denies any difficulty, pain, or globus sensation. Mild oral residue which pt acknowledges but given time and use of liquid wash he was able to clear his oral cavity WFL. Mild oropharyngeal dysphagia, pt is independently utilizing strategies for safe and efficient PO intake. Continue a regular diet and thin liquids with general swallow strategies of upright position, small bites/sips, slow rate of intake, and alternate solids and liquids using a tongue sweep as needed to clear oral cavity. Swallow evaluation only, no need for additional skilled services at this time. Continue SLP services for dysphagia and cog/ling deficits.     Intensity of therapy:   PT 60 minutes, 6x/week, for 18 days   OT 60 minutes, 6 times/week, for 18 days   SLP 60 minutes, 6 times/week, for 18 days      Orthotics AFO, PRAFO, and wrist splint  Education stroke  Neuropsychology Testing: No        Medical Prognosis: good       Physician summary statement: decompressive craniectomy in setting of hypertensive emergency, goal is to reach a level of mod I     Discharge destination: prior home  Discharge rehabilitation needs: home care, RN, PT, OT, and SLP      Estimated length of stay: 18 days      Rehabilitation Physician Angel Vargas DO

## 2024-11-07 NOTE — PROGRESS NOTES
Skilled set up on :  Pt dependent for proper placement of electrodes on LUE for optimum muscle contraction, safe positioning on w/c within frame and cushion for prevention of skin breakdown, feet secured to foot pedals, w/c secured to  w/ Q-straints.  Passive motion assessed to ensure proper positioning.      Pt performed 20 minutes of active FES ergometry with 0-100% stimulation applied to above muscles at .50 rpm with 35 Nm resistance. This OT adjusted e-stim and cycling parameters in real-time to ensure palpable muscle contractions throughout session.      Changes in parameters that were part of this treatment:   -resistance  -pulse width, frequency  -amplitude  -pedal speed    Functional outcomes from this intervention include:   -reduced spasticity  -improved sensory awareness and proprioception  -improved muscle strength  -improved motor coordination

## 2024-11-07 NOTE — PLAN OF CARE
End of shift summary: Pt A&O x4 and is able to make needs known. Call light in reach. Pain managed with PRN tylenol and lidocaine patch     VS: Stable on RA    CV/Resp: Denies SOB and chest pain   Neuro/MSK: L sided hemiplegia. Absent movement on LUE and LLE. Sensation present, pt reports numbness and tingling on LUE and numbness on LLE. L sided mouth droop  Pain: R shoulder pain 7/10 - lidocaine patch on. 5/10 pain on neck + headache - managed with PRN tylenol.   GI/: Incontinent of B&B. Primofit on overnight. LBM 11/6  Diet: Regular with thin liquids. Takes pills whole one at a time    Activity: Ax2 with lift   Skin: Craniotomy incision JUANITO  Lines/Drains: None     Plan: Discharge plan pending, continue POC

## 2024-11-07 NOTE — PLAN OF CARE
Acute Rehab Care Conference/Team Rounds      Type: Team Rounds    Present: Dr. Vargas, Dr. Elias Neuropsychologist, Tiki Way PT, Angela Moran OT, Holly Morley SLP, Kay Caldwell , Wendy Sims RD, Myrna Galloway RN, and Carl Sierra Patient.      Discharge Barriers/Treatment/Education    Rehab Diagnosis:  post craniectomy 2/2 CVA and L hemiplegia     Active Medical Co-morbidities/Prognosis:     Patient Active Problem List   Diagnosis    Acute ischemic right MCA stroke (H)   Gait instability   L hemiplegia       Safety:Pt alert and oriented, able to make needs known, calls appropriately, A2 lift for transfers, bed alarm ON    Pain: pain managed by scheduled and PRN medication, Lido patch and cream as well.     Medications, Skin, Tubes/Lines: takes medication whole, no skin issue, no lines/tubes    Swallowing/Nutrition: regular,  thin (0) liquids - set up assist as needed, reminder for upright position/chair position is ideal, small bites/sips, slow rate, and alternate solids and liquids. No further SLP indicated for dysphagia as pt is independently utilizing strategies for safe and efficient oral intake.        Bowel/Bladder: Incontinent of B&B, Primo fit at night, LBM 11/6/2024 ( per report )     Psychosocial: Lives with significant other, Renea. Grew up in [place], mother still lives there but is able to fly in. Significant other's family supportive. Works full-time as an  at the [employer].     ADLs/IADLs: Pt early in ARU stay. Pt presenting with L hemiparesis, impaired trunk control, impaired sensation, impaired cognition, and R shoulder pain. Pt requires liko lift Ax2 to/from purple dependent shower chair/commode for toileting and showering. Pt requires Ax2 with LBD tasks and toilet hygiene supine. Pt requires Total A with UBD tasks seated or supine. Pt requires min A with G/H tasks seated. Plan to initiate FES for LUE neuro re-education and  sensorimotor skills. Due to pt's severity of deficits anticipate a 4 week LOS at ARU then a prolonged rehab course vs home modifications and assistance from family upon discharge.     Mobility: pt early in rehab stay, presenting with L hemiparesis, impaired sitting balance, sensation impairments, inattention and impaired cognition. Currently pt requires Ax2 for all mobility including bed mobility, sitting unsupported, and OH lift for transfers Ax2. Based upon current deficits and PASS score pt likely to not be ambulatory by discharge, anticipate will require either prolonged rehab course vs home modifications and increased assist as pt previously independent in mobility.     Cognition/Language: Moderate cognitive -communication impairment in areas of attention, memory, executive function and visuospatial skills. L neglect. May benefit from increased IADL assist pending progress and ongoing SLP    Community Re-Entry:  therapies at discharge vs ongoing rehab. Community mobility wc based with assist.    Transportation: Currently pt would require stretcher medical transportation d/t impaired sitting balance.     Decision maker: self    Plan of Care and goals reviewed and updated.    Discharge Plan/Recommendations    Fall Precautions: continue    Patient/Family input to goals: yes     Estimated length of stay: 18 days     Overall plan for the patient: reach a level of mod I       Utilization Review and Continued Stay Justification    Medical Necessity Criteria:    For any criteria that is not met, please document reason and plan for discharge, transfer, or modification of plan of care to address.    Requires intensive rehabilitation program to treat functional deficits?: Yes    Requires 3x per week or greater involvement of rehabilitation physician to oversee rehabilitation program?: Yes    Requires rehabilitation nursing interventions?: Yes    Patient is making functional progress?: Yes    There is a potential for  additional functional progress? Yes    Patient is participating in therapy 3 hours per day a minimum of 5 days per week or 15 hours per week in 7 day period?:Yes    Has discharge needs that require coordinated discharge planning approach?:Yes      Barriers/Concerns related to meeting medical necessity criteria:  None     Team Plan to Address Concern:  As needed      Final Physician Sign off    Statement of Approval:     I agree with all the recommendations detailed in this document.      Angel Vargas, DO      Patient Goals  Social Work Goals: Confirm discharge recommendations with therapy, coordinate safe discharge plan and remain available to support and assist as needed.    OT Predicted Duration/Target Date for Goal Attainment: 12/03/24  Therapy Frequency (OT): 6 times/week  OT: Hygiene/Grooming: independent  OT: Upper Body Dressing: Minimal assist  OT: Lower Body Dressing: Minimal assist  OT: Upper Body Bathing: Minimal assist  OT: Lower Body Bathing: Minimal assist  OT: Bed Mobility: Minimal assist  OT: Transfer: Minimal assist  OT: Toilet Transfer/Toileting: Moderate assist  OT: Cognitive: Patient/caregiver will verbalize understanding of cognitive assessment results/recommendations as needed for safe discharge planning  OT: Goal 1: Patient will demo Min A functional transfer to shower using DME/AE prn       PT Predicted Duration/Target Date for Goal Attainment: 12/02/24  PT Frequency: 6x/week  PT: Bed Mobility: Minimal assist  PT: Transfers: Minimal assist  PT: Wheelchair Mobility: 150 feet, manual wheelchair  PT: Goal 1: Pt and family will independently verbalize 3 fall prevention strategies  PT: Goal 2: Pt's family will complete caregiver training and demonstrate 100% consistency w/ safe assist  for transfers, bed mobility, w/c management and positioning w/ pt prior to discharge.       SLP Predicted Duration/Target Date for Goal Attainment: 12/18/24  Therapy Frequency (SLP Eval): 6 times/week  SLP:  Goal 1: Patient will attend to left side within tasks given strategies and mod cues.  SLP: Goal 2: Patient will recall important information with use of external aids and strategies given min cues.  SLP: Goal 3: Patient will complete easy to moderate level executive function and problem solving/reasoning tasks with 80% accuracy given min cues.          Nursing Goals  Bowel and Bladder care  Fall prevention   Medication Education  Skin Care protection         Plan of Care Reviewed With: patient    Overall Patient Progress: no changeOverall Patient Progress: no change

## 2024-11-08 ENCOUNTER — APPOINTMENT (OUTPATIENT)
Dept: OCCUPATIONAL THERAPY | Facility: CLINIC | Age: 49
DRG: 057 | End: 2024-11-08
Attending: PHYSICAL MEDICINE & REHABILITATION
Payer: COMMERCIAL

## 2024-11-08 ENCOUNTER — APPOINTMENT (OUTPATIENT)
Dept: SPEECH THERAPY | Facility: CLINIC | Age: 49
DRG: 057 | End: 2024-11-08
Attending: PHYSICAL MEDICINE & REHABILITATION
Payer: COMMERCIAL

## 2024-11-08 ENCOUNTER — APPOINTMENT (OUTPATIENT)
Dept: PHYSICAL THERAPY | Facility: CLINIC | Age: 49
DRG: 057 | End: 2024-11-08
Attending: PHYSICAL MEDICINE & REHABILITATION
Payer: COMMERCIAL

## 2024-11-08 LAB
GLUCOSE BLDC GLUCOMTR-MCNC: 101 MG/DL (ref 70–99)
GLUCOSE BLDC GLUCOMTR-MCNC: 111 MG/DL (ref 70–99)
GLUCOSE BLDC GLUCOMTR-MCNC: 114 MG/DL (ref 70–99)
GLUCOSE BLDC GLUCOMTR-MCNC: 117 MG/DL (ref 70–99)

## 2024-11-08 PROCEDURE — 93005 ELECTROCARDIOGRAM TRACING: CPT

## 2024-11-08 PROCEDURE — 250N000013 HC RX MED GY IP 250 OP 250 PS 637

## 2024-11-08 PROCEDURE — 250N000011 HC RX IP 250 OP 636

## 2024-11-08 PROCEDURE — 250N000013 HC RX MED GY IP 250 OP 250 PS 637: Performed by: PHYSICAL MEDICINE & REHABILITATION

## 2024-11-08 PROCEDURE — 97535 SELF CARE MNGMENT TRAINING: CPT | Mod: GO | Performed by: OCCUPATIONAL THERAPIST

## 2024-11-08 PROCEDURE — 128N000003 HC R&B REHAB

## 2024-11-08 PROCEDURE — 97530 THERAPEUTIC ACTIVITIES: CPT | Mod: GP

## 2024-11-08 PROCEDURE — 97112 NEUROMUSCULAR REEDUCATION: CPT | Mod: GO | Performed by: OCCUPATIONAL THERAPIST

## 2024-11-08 PROCEDURE — 97112 NEUROMUSCULAR REEDUCATION: CPT | Mod: GP

## 2024-11-08 PROCEDURE — 97130 THER IVNTJ EA ADDL 15 MIN: CPT | Mod: GN | Performed by: SPEECH-LANGUAGE PATHOLOGIST

## 2024-11-08 PROCEDURE — 97129 THER IVNTJ 1ST 15 MIN: CPT | Mod: GN | Performed by: SPEECH-LANGUAGE PATHOLOGIST

## 2024-11-08 PROCEDURE — 99232 SBSQ HOSP IP/OBS MODERATE 35: CPT | Performed by: PHYSICAL MEDICINE & REHABILITATION

## 2024-11-08 RX ORDER — METHOCARBAMOL 500 MG/1
500 TABLET, FILM COATED ORAL 3 TIMES DAILY PRN
Status: DISCONTINUED | OUTPATIENT
Start: 2024-11-08 | End: 2024-11-08

## 2024-11-08 RX ORDER — METHOCARBAMOL 500 MG/1
500 TABLET, FILM COATED ORAL AT BEDTIME
Status: DISCONTINUED | OUTPATIENT
Start: 2024-11-08 | End: 2024-11-14

## 2024-11-08 RX ADMIN — ACETAMINOPHEN 650 MG: 325 TABLET, FILM COATED ORAL at 03:06

## 2024-11-08 RX ADMIN — Medication: at 21:03

## 2024-11-08 RX ADMIN — CLOPIDOGREL BISULFATE 75 MG: 75 TABLET ORAL at 07:54

## 2024-11-08 RX ADMIN — LOSARTAN POTASSIUM 50 MG: 50 TABLET, FILM COATED ORAL at 20:53

## 2024-11-08 RX ADMIN — PANTOPRAZOLE SODIUM 40 MG: 40 TABLET, DELAYED RELEASE ORAL at 07:07

## 2024-11-08 RX ADMIN — ACETAMINOPHEN 650 MG: 325 TABLET, FILM COATED ORAL at 22:10

## 2024-11-08 RX ADMIN — ENOXAPARIN SODIUM 40 MG: 40 INJECTION SUBCUTANEOUS at 13:05

## 2024-11-08 RX ADMIN — METOPROLOL TARTRATE 50 MG: 50 TABLET, FILM COATED ORAL at 07:54

## 2024-11-08 RX ADMIN — METHOCARBAMOL 500 MG: 500 TABLET ORAL at 20:52

## 2024-11-08 RX ADMIN — LOSARTAN POTASSIUM 50 MG: 50 TABLET, FILM COATED ORAL at 07:54

## 2024-11-08 RX ADMIN — POLYETHYLENE GLYCOL 3350 17 G: 17 POWDER, FOR SOLUTION ORAL at 20:52

## 2024-11-08 RX ADMIN — ASPIRIN 81 MG CHEWABLE TABLET 81 MG: 81 TABLET CHEWABLE at 07:53

## 2024-11-08 RX ADMIN — Medication: at 15:58

## 2024-11-08 RX ADMIN — FAMOTIDINE 20 MG: 20 TABLET ORAL at 20:52

## 2024-11-08 RX ADMIN — AMLODIPINE BESYLATE 10 MG: 10 TABLET ORAL at 07:53

## 2024-11-08 RX ADMIN — Medication 6.25 MG: at 21:03

## 2024-11-08 RX ADMIN — ACETAMINOPHEN, CAFFEINE 1 TABLET: 500; 65 TABLET, FILM COATED ORAL at 07:53

## 2024-11-08 RX ADMIN — FAMOTIDINE 20 MG: 20 TABLET ORAL at 07:54

## 2024-11-08 RX ADMIN — LIDOCAINE 1 PATCH: 4 PATCH TOPICAL at 20:52

## 2024-11-08 RX ADMIN — BISACODYL 10 MG: 10 SUPPOSITORY RECTAL at 18:22

## 2024-11-08 RX ADMIN — INSULIN GLARGINE 7 UNITS: 100 INJECTION, SOLUTION SUBCUTANEOUS at 07:55

## 2024-11-08 RX ADMIN — METOPROLOL TARTRATE 50 MG: 50 TABLET, FILM COATED ORAL at 20:53

## 2024-11-08 RX ADMIN — THIAMINE HCL TAB 100 MG 100 MG: 100 TAB at 07:54

## 2024-11-08 RX ADMIN — INSULIN GLARGINE 7 UNITS: 100 INJECTION, SOLUTION SUBCUTANEOUS at 21:18

## 2024-11-08 RX ADMIN — ATORVASTATIN CALCIUM 40 MG: 40 TABLET, FILM COATED ORAL at 20:52

## 2024-11-08 RX ADMIN — ACETAMINOPHEN 650 MG: 325 TABLET, FILM COATED ORAL at 15:58

## 2024-11-08 RX ADMIN — SENNOSIDES 2 TABLET: 8.6 TABLET, FILM COATED ORAL at 07:54

## 2024-11-08 RX ADMIN — Medication 5 MG: at 20:53

## 2024-11-08 NOTE — PLAN OF CARE
Discharge Planner Post-Acute Rehab OT:      Discharge Plan: Home with assistance vs TCU     Precautions: Falls, Craniectomy - helmet OOB, LUE/LLE hemiparesis, L neglect.  *Safe sitting plan     Current Status:  ADLs:  Mobility: A x 2 bed mobility. Lift transfer. Wheelchair based in Broda chair.   Grooming: Min A seated.  Dressing: UB A x 2 seated in chair. LB A x 2 supine.   Bathing: liko lift Ax2 to/from purple shower chair/commode. Assistance with washing/rinsing/drying 6/10 body parts.   Toileting: Liko lift Ax2 to/from purple dependent shower chair/commode. Dependent hygiene/clothing.  IADLs: PLOF IND. Recommend assist.  Vision/Cognition: Full time corrective lenses. L neglect, possible peripheral field deficits. Moderate cognitive deficits attention, recall, comprehension, problem solving.      Assessment: Working on WB at EOB this date, as well as funcitonal ADLs with rhianna LUE. Pt having fatigue and pain in R shoulder d/t over compensation. Tx to wc with assist of 2. NMES initiated on L rhianna shoulder; See flowsheet for details.     Other Barriers to Discharge (DME, Family Training, etc):   Home set up: 1 LUPE. 16 stairs to access bedroom/bathroom. Can stay on main level with bedroom/bathroom but no shower.   Caregiver support: Partner working remotely, involved and present on unit.   Equipment needs pending progress.

## 2024-11-08 NOTE — PLAN OF CARE
Discharge Plan: home w/ assist. HHPT    Precautions: fall, crani, helmet OOB. Do not leave alone EOB/commode.    Current Status:  Bed Mobility: Ax2 rolling and sup<>sit  Transfer: Liko Ax2  Gait: unable, unsafe  Stairs: unable, unsafe  Balance: Needs maxAx1-2 for static sitting     Outcome Measures:   PASS    11/5: 0/36  Rice   11/5: 0/56    Assessment: FES bike for sensory motor re-integration, see below for parameters.    Other Barriers to Discharge (DME, Family Training, etc):   1 LUPE (threshold) + 15STI to access bedroom/bathroom. Option for main floor living in den.     DME: pending progress    ---------------------------------------------------------------------------  Skilled set up on :  Pt dependent for proper placement of electrodes on L TA, gastroc, hamstring, quadriceps for optimum muscle contraction, safe positioning on w/c within frame and cushion for prevention of skin breakdown, feet secured to foot pedals, w/c secured to  w/ Q-straints.  Passive motion assessed to ensure proper positioning.      Pt performed 14 minutes of active FES ergometry with 100% stimulation applied to above muscles at 35 rpm with 1.99 Nm resistance.  This PT adjusted e-stim and cycling parameters in real-time to ensure palpable muscle contractions throughout session.      Changes in parameters that were part of this treatment:   -resistance  -pulse width, frequency  -amplitude  -pedal speed      Functional outcomes from this intervention include:   -reduced spasticity  -improved sensory awareness and proprioception  -improved muscle strength  -improved motor coordination    Session Summary:   -Average Power: 5.2  -Asymmetry: R6%  -Active Minutes: 8:34

## 2024-11-08 NOTE — PLAN OF CARE
Goal Outcome Evaluation:      Plan of Care Reviewed With: patient    Overall Patient Progress: improvingOverall Patient Progress: improving       Vitals/BGs: Stable vitals and BGs. No correction novolog given. Just scheduled Lantus insulin 7 units in the morning.  Orientation: Pt alert and oriented x 4.   Pain: c/o right shoulder pain. Prn tylenol helpful. Overnight lidocaine patch also helpful per pt.  Mobility: Liko lift with 2 staff to transfer on and off the bed. Assist of 2 to reposition in bed.  Diet: Tolerating regular/thin diet with fair appetite.  Bladder: Mixed continence. Using the urinal but he sometimes spills. Primofit at night.  Bowel: Bowel program with a supp. He had a medium result in the purple shower chair/BSC.  Skin/Lines: Scalp incision healing well, JUANITO.  Safety: Helmet when out of bed. Alarms on at all times.

## 2024-11-08 NOTE — PLAN OF CARE
Goal Outcome Evaluation:      Plan of Care Reviewed With: patient    Overall Patient Progress: no changeOverall Patient Progress: no change     Pt is alert and oriented x 4, but forgetful. Assist of 2 with lift, continent of bowel and bladder, last BM 11/7, but uses premafit overnight, on a regular diet thin liquids, meds whole with water, pt needs help with breakfast. Pt was repositioned and changed this shift. Pt denied chest pain, N/V, SOB, no acute issue noted this shift. Continue pt's plan of care

## 2024-11-08 NOTE — PLAN OF CARE
Discharge Planner Post-Acute Rehab SLP:      Discharge Plan: TCU vs home with ongoing SLP     Precautions: fall, crani/helmet, impulsivity     Current Status:  Hearing: WFL  Vision: glasses; significant visual impairment (baseline per patient) as well as left neglect/inattention  Communication: Mild dysarthria; speech intelligibility 80% in conversation  Cognition: Moderate cogntiive-communication impairment in areas of attention, memory, executive function and visuospatial skills.   Swallow: Swallow eval 11/6 - continue regular diet and thin liquids - set up assist as needed, reminder for upright position/chair position is ideal, small bites/sips, slow rate, and alternate solids and liquids. No further swallow goals at this time as pt is independently utilizing strategies for safe and efficient oral intake.      Assessment: Patient participated in visual scanning task locating information on schedule. Patient needed mod-max cues to scan to left most of the time to read questions but for final question was independent. Patient located information on schedule with 80% accuracy. Patient recalled details from visual scene from session 11/7/2024 with 80% accuracy.     PM session: Patient participated in generative naming task and stated 10-13 items per category. Patient participated in visual scanning/attention task following 2 part written instructions. Patient benefitted from visual anchor on left side of page and bright orange folder behind page as a border and needed min-moderate cues to scan left. At times patient becoming more independent with scanning left. Patient completed instructions accurately once visualized. Patient participated in divided attention/trail making task given assist with pencil. Patient stated and located next letter or number (alternating letter/number task) given occasional min cues. Patient reported significant visual deficits PTA and tendency to read information too quickly PTA.         Other Barriers to Discharge (Family Training, etc): TBD

## 2024-11-08 NOTE — PROGRESS NOTES
[facility]   Acute Rehabilitation Unit  Daily Progress Note    INTERVAL HISTORY  Notes and labs reviewed over past 24 hours. No acute overnight events reported. LUE and LLE weakness is unchanged. Sleep is improved but still bothered by right shoulder/neck pain. Pain is worse at night/mornings after inactivity. Mild headache but feels this is manageable. Excedrin has been helping with pain and alertness. Feels therapies are going okay. No chest pain, shortness of breath, abdominal pain, nausea, or vomiting.     Will start on Robaxin 500 mg at nighttime for right shoulder/neck pain.     ROS: 10 point ROS was assessed and was negative unless otherwise stated in HPI    Functional  OT: 11/7  ADLs:  Mobility: A x 2 bed mobility. Lift transfer. Wheelchair based in Broda chair.   Grooming: Min A seated.  Dressing: UB A x 2 seated in chair. LB A x 2 supine.   Bathing: liko lift Ax2 to/from purple shower chair/commode. Assistance with washing/rinsing/drying 6/10 body parts.   Toileting: Liko lift Ax2 to/from purple dependent shower chair/commode. Dependent hygiene/clothing.  IADLs: PLOF IND. Recommend assist.  Vision/Cognition: Full time corrective lenses. L neglect, possible peripheral field deficits. Moderate cognitive deficits attention, recall, comprehension, problem solving.     PT: 11/8  Current Status:  Bed Mobility: Ax2 rolling and sup<>sit  Transfer: Liko Ax2  Gait: unable, unsafe  Stairs: unable, unsafe  Balance: Needs maxAx1-2 for static sitting       SLP: 11/7  Hearing: WFL  Vision: glasses  Communication: Mild dysarthria; speech intelligibility 80% in conversation  Cognition: Moderate cogntiive-communication impairment in areas of attention, memory, executive function and visuospatial skills.   Swallow: Swallow eval 11/6 - continue regular diet and thin liquids - set up assist as needed, reminder for upright position/chair position is ideal, small bites/sips, slow rate, and  alternate solids and liquids. No further swallow goals at this time as pt is independently utilizing strategies for safe and efficient oral intake.       MEDICATIONS  Scheduled meds  Current Facility-Administered Medications   Medication Dose Route Frequency Provider Last Rate Last Admin    [START ON 11/9/2024] acetaminophen-caffeine (EXCEDRIN TENSION HEADACHE) 500-65 MG tablet 1 tablet  1 tablet Oral Daily Angel Vargas DO        amLODIPine (NORVASC) tablet 10 mg  10 mg Oral Daily Yessica Villalobos MD   10 mg at 11/08/24 0753    aspirin (ASA) chewable tablet 81 mg  81 mg Oral Daily Zac Hurley MD   81 mg at 11/08/24 0753    atorvastatin (LIPITOR) tablet 40 mg  40 mg Oral QPM Zac Hurley MD   40 mg at 11/07/24 2047    bisacodyl (DULCOLAX) suppository 10 mg  10 mg Rectal Daily Angel Vargas DO   10 mg at 11/07/24 1810    clopidogrel (PLAVIX) tablet 75 mg  75 mg Oral Daily Zac Hurley MD   75 mg at 11/08/24 0754    enoxaparin ANTICOAGULANT (LOVENOX) injection 40 mg  40 mg Subcutaneous Q24H Zac Hurley MD   40 mg at 11/07/24 1526    famotidine (PEPCID) tablet 20 mg  20 mg Oral BID Zac Hurley MD   20 mg at 11/08/24 0754    insulin aspart (NovoLOG) injection (RAPID ACTING)  1-3 Units Subcutaneous TID AC Zac Hurley MD        insulin aspart (NovoLOG) injection (RAPID ACTING)  1-3 Units Subcutaneous At Bedtime Zac Hurley MD        insulin glargine (LANTUS PEN) injection 7 Units  7 Units Subcutaneous QAM AC Zac Hurley MD   7 Units at 11/08/24 0755    insulin glargine (LANTUS PEN) injection 7 Units  7 Units Subcutaneous At Bedtime Zac Hurley MD   7 Units at 11/07/24 2226    Lidocaine (LIDOCARE) 4 % Patch 1 patch  1 patch Transdermal Q24h Angel Vargas DO   1 patch at 11/07/24 2047    losartan (COZAAR) tablet 50 mg  50 mg Oral BID Yessica Villalobos MD   50 mg at 11/08/24 0754    melatonin tablet 5 mg  5 mg Oral At Bedtime Angel Vargas DO   5 mg at  "11/07/24 2059    methocarbamol (ROBAXIN) tablet 500 mg  500 mg Oral At Bedtime Angel Vargas,         metoprolol tartrate (LOPRESSOR) tablet 50 mg  50 mg Oral BID Yessica Villalobos MD   50 mg at 11/08/24 0754    pantoprazole (PROTONIX) EC tablet 40 mg  40 mg Oral QAM AC Yessica Villalobos MD   40 mg at 11/08/24 0707    polyethylene glycol (MIRALAX) Packet 17 g  17 g Oral Daily Zac Hurley MD   17 g at 11/07/24 2047    sennosides (SENOKOT) tablet 2 tablet  2 tablet Oral Daily Zac Hurley MD   2 tablet at 11/08/24 0754    thiamine (B-1) tablet 100 mg  100 mg Oral Daily Zac Hurley MD   100 mg at 11/08/24 0754       PRN meds:  Current Facility-Administered Medications   Medication Dose Route Frequency Provider Last Rate Last Admin    acetaminophen (TYLENOL) tablet 650 mg  650 mg Oral Q4H PRN Zac Hurley MD   650 mg at 11/08/24 0306    glucose gel 15-30 g  15-30 g Oral Q15 Min PRN Zac Hruley MD        Or    dextrose 50 % injection 25-50 mL  25-50 mL Intravenous Q15 Min PRN Zac Hurley MD        Or    glucagon injection 1 mg  1 mg Subcutaneous Q15 Min PRN Zac Hurley MD        menthol (Topical Analgesic) 2.5% (BENGAY VANISHING SCENT) 2.5 % topical gel   Topical 4x Daily PRN Yessica Villalobos MD   Given at 11/07/24 0658    QUEtiapine (SEROquel) quarter-tab 6.25 mg  6.25 mg Oral At Bedtime PRN Zac Hurley MD   6.25 mg at 11/07/24 2048         PHYSICAL EXAM  BP (!) 158/88 (BP Location: Right arm)   Pulse 76   Temp 97.9  F (36.6  C) (Oral)   Resp 18   Ht 1.753 m (5' 9\")   Wt 102 kg (224 lb 13.9 oz)   SpO2 99%   BMI 33.21 kg/m    General: in no acute distress, conversational and alert, resting comfortably in chair  HEENT: Craniotomy incision C/D/I. nares clear, conjunctiva white  Pulmonary: on room air, no acute distress  Cardiovascular: Warm, well perfused  Abdominal: soft, non-tender to palpation, non-distended  Extremities: warm peripherally, no edema in BLEs  Skin: warm, " dry, without erythema, ecchymosis, or rash noted. Craniotomy site C/D/I.   MSK: R upper trap taut.   Neuro: Conjugate gaze, left facial droop, Flaccid paralysis of the left upper and lower extremity. No spasticity noted in LUE/LLE testing. No clonus noted.     LABS  Results for orders placed or performed during the hospital encounter of 11/04/24 (from the past 24 hours)   Glucose by meter   Result Value Ref Range    GLUCOSE BY METER POCT 135 (H) 70 - 99 mg/dL   Glucose by meter   Result Value Ref Range    GLUCOSE BY METER POCT 135 (H) 70 - 99 mg/dL   Glucose by meter   Result Value Ref Range    GLUCOSE BY METER POCT 118 (H) 70 - 99 mg/dL   Glucose by meter   Result Value Ref Range    GLUCOSE BY METER POCT 101 (H) 70 - 99 mg/dL       ASSESSMENT AND PLAN  Carl Sierra is a 49 year old left hand dominant male with past medical history of HTN and undiagnosed GLENN, who initially presented to October 2 with hypertensive emergency, left upper extremity weakness and numbness due to right MCA ischemic stroke. He underwent right craniectomy on October 5 for the management of cerebral edema and midline shift. Hospital course was complicated but he clinically improved and was transferred to ARU.     Admission to acute inpatient rehab: 11/4/2024   Impairment group code: 01.1 Stroke      PT, OT and SLP 60 minutes of each on a daily basis, 6x a week, for 25 days,  in addition to rehab nursing and close management of physiatrist.     Impairment of ADL's:  OT for 60 min daily, 6x a week for 25 days, to work on upper and lower body self care, dressing, toileting, bathing, energy conservation techniques with use of ADs as needed   Impairment of mobility:   PT for 60 min daily, 6x a week for 25 days, to work on strengthening, endurance buildup, transfers and most likely WC mobility.   Impairment of cognition/language/swallow:   SLP for 60 min daily, 6x a week  for cognitive evaluation and treatment strategies for higher level cognitive  deficits and memory impairment   Rehab RN to administer medication, patient education on medication taking, VS monitoring, bowel regimen, glucose monitoring and wound care/surgical wound dressing changes and monitoring.      Medical Conditions  # Right MCA ischemic stroke s/p TNK  # Cerebral edema s/p decompressive craniectomy 10/5  Brain MRI also showed  1. Relatively large acute infarct in the right middle cerebral artery territory involving the insular cortex as well as portions of the frontal and parietal cortex including the pre and postcentral gyri. Additional smaller acute infarcts are also present along the cortex elsewhere in the right middle cerebral artery territory. A few small acute infarcts are present in the posterior medial right cerebellum and a few punctate acute infarcts are present in the left anterior cerebral artery territory. There is no hemorrhage or mass effect.   2. Chronic infarcts in the left brachium pontis and left cerebral peduncle.   CT angio showed  Short segment severe stenosis of the posterior branch of the M2 segment of the right MCA.   1 cm long severe irregular stenosis of the A3 segment of the right anterior cerebral artery.   CT head was done on 10/18, which was concerning for hemorrhagic petechiae in right MCA territory with evolution of stroke. Repeat CT head ordered on 10/19, unchanged compared to previous imaging.      Completed 7 day course of [facility]   Discharging team and neurosurgery  recommended low dose caffeine or modafinil to help with alertness if needed     - continue ASA and plavix  - consider modafinil.   - continue acetaminophen-caffeine (Excedrin Tension Headache) 500-65 mg tablet daily (was started on 10/25)    - stroke secondary prevention as below  - surgical wound healing well; staples were removed 10/21  - helmet when OOB   - follow up with neurosurgery for bone flap replacement - [facility]  (Would be direct admission day prior for lumbar  drain placement and bone flap replacement the following day.)  - follow up stroke neurology and PM&R as outpatient       # Left Sided Hemiplegia, Flaccid  Patient reports persistent left sided weakness as well as nighttime pain and spasms in his extremities. Examination significant for flaccid paralysis of left upper and lower extremity with complete loss of tone.    - Monitor for spasms  - Consider gabapentin      # HTN  Not managed prior to admission . Has had intermittent hypertension while at ARU.   - continue metoprolol 50 bid   - continue losartan 50 bid (was dosed bid for better control of morning elevation in BP and can be changed to daily dosing)  - amlodipine 10 daily      # HLD  LDL 98 on 10/3.   - continue lipitor 40mg QD  - repeat lipid panel in 3 months      # DM II  HgbA1c was 9.2 10/3  - DM education   - continue lantus 7 units bid   - continue low dose sliding scale insulin   - BG check QID  - discontinued 2am check for now   - will consider addition of  an oral agent in the following days and adjust insulin dose accordingly   - consider endo consult pending his course   - repeat HgbA1C in 3 months      # NICM/HFrEF  EF 40 to 45% with global hypokinesis but no intracardiac shunt   - repeat TTE and follow up with cardiology in a month      # Probable undiagnosed GLENN   Family reported episodes of apnea during the night.   - continue prn O2  - formal sleep study as outpatient       # Hyponatremia, Resolving  Resolved per discharging team; it was 135 and slightly low on 11/4  - Will continue to monitor with qMon/Thurs BMP     # Normocytic Anemia   Mild and stable, likely due to chronic disease vs CHAD.   - Will continue to monitor with qMon/Thurs CBC    # Headaches  # Neck pain   # L knee pain   Headaches seem to be well controlled on tylenol. Typically starts towards the end of the day and also responded to lying flat and hydration.   -Was using lidocaine patch and bengay for neck pain as well as prn  norco. Pain seems myofascial in nature with no clinical s/s of radiculopathy. Likely trigger point, at this point wants to avoid trigger point injections.   L knee pain for 20 years due to sports injury and likely early OA.   - Add Robaxin 500 mg at nighttime, monitor for drowsiness   - continue tylenol 650 mg Q4H PRN   - was also on norco 0.5-1 tablet q4h prn since 10/29; was discontinued at discharge.   - reported no benefits from lidocaine so it was discontinued  - added topical bengay cream   - can consider TENS for the myofascial pain      # Insomnia   Per chart review, Seroquel made him agitated and was not beneficial so it was discontinued at discharge and per sign out from discharging team (last dose 11/2). However, on admission to ARU, patient states that Seroquel was beneficial.   - continue melatonin   - Seroquel 6.25 mg PO HS PRN   - consider ramelteon      # Obesity  BMI 33.86  - nutrition consult      # Depression  # Adjustment to disability  Worsening mood in the setting of his stroke and disability.   - rehab psych consult   - spiritual health consult   - can consider selective serotonin reuptake inhibitor vs SNRI      # Neurogenic bowel  - Miralax one packet scheduled daily   - Senna BID, starting tmrw AM   -  Bisacodyl 10mg suppository QD PRN     # Neurogenic bladder:   - ok to use primofit  - will start using urinal during the day and possibly toileting using the commode pending his course       FEN: regular with thin. Continued B1 supplement.   DVT Prophylaxis: DAPT, lovenox 40 mg QD  GI Prophylaxis: Added protonix upon admission. On pepcid bid which can discontinued pending his symptoms.    Code: Full - confirmed upon admission   Disposition: he will  based and will require assistance with all ADLs and mobility; home is not accessible. Will need as ramp and possibly chair lift for stairs.   ELOS/Discharge Date:  4 weeks and most likely TCU after that before discharge to home.  Rehab prognosis:   Good   Follow up Appointments on Discharge:   Outpatient PCP in 1-2 weeks.   Outpatient PM&R in 1-2 months.  Stroke neurology   Neurosurgery    Cardiology   Sleep medicine   Psychology       Ash Velez MS3        Physician Attestation   I, Angel Vargas DO, was present with the medical/MARIANO student who participated in the service and in the documentation of the note.  I have verified the history and personally performed the physical exam and medical decision making.  I agree with the assessment and plan of care as documented in the note.          Please see A&P for additional details of medical decision making.          Angel Vargas DO  Date of Service (when I saw the patient): 11/08/24

## 2024-11-08 NOTE — PLAN OF CARE
"Goal Outcome Evaluation:      Plan of Care Reviewed With: patient    Overall Patient Progress: no changeOverall Patient Progress: no change      VS:  BP (!) 158/88 (BP Location: Right arm)   Pulse 76   Temp 97.9  F (36.6  C) (Oral)   Resp 18   Ht 1.753 m (5' 9\")   Wt 102 kg (224 lb 13.9 oz)   SpO2 99%   BMI 33.21 kg/m       O2: SpO2 > 99 and stable on RA. Denies chest pain and SOB.    Output: Voids spontaneously without difficulty. Primofit on during the shift    Last BM: 11/8 denies abdominal discomfort. BS active / passing flatus.    Activity: Up with A2 LIKO    Skin: WDL except, craniotomy incision which is open to air    Pain: Pain managed with PRN tylenol    CMS: Intact, AOx4.    Dressing:     Diet: Regular diet. Denies nausea/vomiting.   BG checks before meals and at bedtime. BG check at bedtime   LDA: None    Equipment: personal belongings,    Plan: Continue with plan of care. Call light within reach, pt able to make needs known.    Additional Info: Safety rounds completed. Family at bedside throughout the shift.           "

## 2024-11-09 ENCOUNTER — APPOINTMENT (OUTPATIENT)
Dept: PHYSICAL THERAPY | Facility: CLINIC | Age: 49
DRG: 057 | End: 2024-11-09
Attending: PHYSICAL MEDICINE & REHABILITATION
Payer: COMMERCIAL

## 2024-11-09 ENCOUNTER — APPOINTMENT (OUTPATIENT)
Dept: OCCUPATIONAL THERAPY | Facility: CLINIC | Age: 49
DRG: 057 | End: 2024-11-09
Attending: PHYSICAL MEDICINE & REHABILITATION
Payer: COMMERCIAL

## 2024-11-09 LAB
GLUCOSE BLDC GLUCOMTR-MCNC: 107 MG/DL (ref 70–99)
GLUCOSE BLDC GLUCOMTR-MCNC: 109 MG/DL (ref 70–99)
GLUCOSE BLDC GLUCOMTR-MCNC: 109 MG/DL (ref 70–99)
GLUCOSE BLDC GLUCOMTR-MCNC: 116 MG/DL (ref 70–99)

## 2024-11-09 PROCEDURE — 250N000011 HC RX IP 250 OP 636

## 2024-11-09 PROCEDURE — 128N000003 HC R&B REHAB

## 2024-11-09 PROCEDURE — 99231 SBSQ HOSP IP/OBS SF/LOW 25: CPT | Performed by: STUDENT IN AN ORGANIZED HEALTH CARE EDUCATION/TRAINING PROGRAM

## 2024-11-09 PROCEDURE — 250N000013 HC RX MED GY IP 250 OP 250 PS 637

## 2024-11-09 PROCEDURE — 97530 THERAPEUTIC ACTIVITIES: CPT | Mod: GP | Performed by: PHYSICAL THERAPIST

## 2024-11-09 PROCEDURE — 250N000013 HC RX MED GY IP 250 OP 250 PS 637: Performed by: PHYSICAL MEDICINE & REHABILITATION

## 2024-11-09 PROCEDURE — 97530 THERAPEUTIC ACTIVITIES: CPT | Mod: GP

## 2024-11-09 PROCEDURE — 97112 NEUROMUSCULAR REEDUCATION: CPT | Mod: GP | Performed by: PHYSICAL THERAPIST

## 2024-11-09 PROCEDURE — 97535 SELF CARE MNGMENT TRAINING: CPT | Mod: GO | Performed by: STUDENT IN AN ORGANIZED HEALTH CARE EDUCATION/TRAINING PROGRAM

## 2024-11-09 PROCEDURE — 97112 NEUROMUSCULAR REEDUCATION: CPT | Mod: GP

## 2024-11-09 PROCEDURE — 97112 NEUROMUSCULAR REEDUCATION: CPT | Mod: GO | Performed by: STUDENT IN AN ORGANIZED HEALTH CARE EDUCATION/TRAINING PROGRAM

## 2024-11-09 RX ADMIN — ASPIRIN 81 MG CHEWABLE TABLET 81 MG: 81 TABLET CHEWABLE at 08:07

## 2024-11-09 RX ADMIN — ENOXAPARIN SODIUM 40 MG: 40 INJECTION SUBCUTANEOUS at 13:06

## 2024-11-09 RX ADMIN — FAMOTIDINE 20 MG: 20 TABLET ORAL at 20:33

## 2024-11-09 RX ADMIN — INSULIN GLARGINE 7 UNITS: 100 INJECTION, SOLUTION SUBCUTANEOUS at 22:08

## 2024-11-09 RX ADMIN — THIAMINE HCL TAB 100 MG 100 MG: 100 TAB at 08:07

## 2024-11-09 RX ADMIN — INSULIN GLARGINE 7 UNITS: 100 INJECTION, SOLUTION SUBCUTANEOUS at 08:12

## 2024-11-09 RX ADMIN — METOPROLOL TARTRATE 50 MG: 50 TABLET, FILM COATED ORAL at 20:33

## 2024-11-09 RX ADMIN — LIDOCAINE 1 PATCH: 4 PATCH TOPICAL at 20:33

## 2024-11-09 RX ADMIN — PANTOPRAZOLE SODIUM 40 MG: 40 TABLET, DELAYED RELEASE ORAL at 06:32

## 2024-11-09 RX ADMIN — LOSARTAN POTASSIUM 50 MG: 50 TABLET, FILM COATED ORAL at 08:07

## 2024-11-09 RX ADMIN — FAMOTIDINE 20 MG: 20 TABLET ORAL at 08:07

## 2024-11-09 RX ADMIN — ATORVASTATIN CALCIUM 40 MG: 40 TABLET, FILM COATED ORAL at 20:33

## 2024-11-09 RX ADMIN — LOSARTAN POTASSIUM 50 MG: 50 TABLET, FILM COATED ORAL at 20:33

## 2024-11-09 RX ADMIN — ACETAMINOPHEN, CAFFEINE 1 TABLET: 500; 65 TABLET, FILM COATED ORAL at 08:07

## 2024-11-09 RX ADMIN — CLOPIDOGREL BISULFATE 75 MG: 75 TABLET ORAL at 08:07

## 2024-11-09 RX ADMIN — ACETAMINOPHEN 650 MG: 325 TABLET, FILM COATED ORAL at 20:50

## 2024-11-09 RX ADMIN — METOPROLOL TARTRATE 50 MG: 50 TABLET, FILM COATED ORAL at 08:07

## 2024-11-09 RX ADMIN — SENNOSIDES 2 TABLET: 8.6 TABLET, FILM COATED ORAL at 08:07

## 2024-11-09 RX ADMIN — METHOCARBAMOL 500 MG: 500 TABLET ORAL at 20:33

## 2024-11-09 RX ADMIN — ACETAMINOPHEN 650 MG: 325 TABLET, FILM COATED ORAL at 02:34

## 2024-11-09 RX ADMIN — BISACODYL 10 MG: 10 SUPPOSITORY RECTAL at 18:15

## 2024-11-09 RX ADMIN — ACETAMINOPHEN 650 MG: 325 TABLET, FILM COATED ORAL at 12:36

## 2024-11-09 RX ADMIN — Medication 6.25 MG: at 20:50

## 2024-11-09 RX ADMIN — Medication 5 MG: at 20:33

## 2024-11-09 RX ADMIN — AMLODIPINE BESYLATE 10 MG: 10 TABLET ORAL at 08:07

## 2024-11-09 NOTE — PROGRESS NOTES
"  [facility]   Acute Rehabilitation Unit  Weekend Progress Note    INTERVAL HISTORY:  Overnight, he had an episode of nonradiating chest pain. EKG showed sinus rhythm.  Started on robaxin yesterday.  Mixed continence.  Last bowel movement 11/8.  He has right shoulder pain that PT also noted, attributing to overuse.  Bengay helps.  No headaches, chest pain, shortness of breath, constipation, or urinary issues.  Family has question about drying powder for groin area.  No endorsed rash.    Functional status update:  He's undergoing FES biking.  Needed cues for reading.  Fatigue and pain in right shoulder due to overcompensation for left arm weakness.  NMES started for left shoulder.  _________________________________________________     OBJECTIVE:     Physical Exam:  BP (!) 151/87 (BP Location: Right arm)   Pulse 83   Temp 97.8  F (36.6  C) (Oral)   Resp 16   Ht 1.753 m (5' 9\")   Wt 102 kg (224 lb 13.9 oz)   SpO2 99%   BMI 33.21 kg/m    GEN:  No acute distress, doing therapy  HEENT: no conjunctival injection, moist mucous membranes  CV:  regular rate and rhythm, no murmurs  PULM:  Clear to auscultation bilaterally  ABD:  soft, nondistended, nontender  EXT:  warm and well perfused.  Right shoulder good range of motion.  Some discomfort on external rotation. Negative daigle.  SKIN:  No rashes or bruises visualized on exposed skin  NEURO/MSK: Alert and oriented, goal oriented thought.  Follows commands promptly.  Moves all extremities.  _________________________________________________  Pertinent Labs/Imaging:  CBC:   Recent Labs   Lab Test 11/07/24  0609   WBC 4.7   RBC 4.27*   HGB 11.1*   MCV 79   MCH 26.0*   MCHC 32.8   RDW 16.0*        BMP:   Recent Labs   Lab Test 11/09/24  0758 11/07/24  0737 11/07/24  0609   NA  --   --  139   POTASSIUM  --   --  3.8   CHLORIDE  --   --  105   CO2  --   --  24   BUN  --   --  6.2   CR  --   --  0.69   *   < > 104*    < > = " values in this interval not displayed.   _________________________________________________     ASSESSMENT:  Carl Sierra is a 49 year old male admitted to acute rehabilitation on 11/4/2024 for right MCA stroke.  Additional complications include craniectomy.     PLAN:  #Rehabilitation  - Continue current appropriate rehabilitation plan of care    #Right shoulder overuse  - continue bengay  - consider diclofenac if bengay no longer effective  - continue shoulder preservation therapy    Laureano Vilchis MD  Physical Medicine & Rehabilitation    I spent a total of 25 minutes face-to-face or in coordination of care during today's visit doing history, exam, counseling, and and management. Over 50% of this time was spent counseling the patient and/or performing coordinating care.

## 2024-11-09 NOTE — PLAN OF CARE
"Goal Outcome Evaluation:      Plan of Care Reviewed With: patient    Overall Patient Progress: no changeOverall Patient Progress: no change    VS:  BP (!) 151/87 (BP Location: Right arm)   Pulse 83   Temp 97.8  F (36.6  C) (Oral)   Resp 16   Ht 1.753 m (5' 9\")   Wt 102 kg (224 lb 13.9 oz)   SpO2 99%   BMI 33.21 kg/m       O2: SpO2 > 99 and stable on RA. Denies chest pain and SOB.    Output: Voids spontaneously without difficulty. Primofit on during the shift    Last BM: 11/9 denies abdominal discomfort. BS active / passing flatus.    Activity: Up with A2 LIKO    Skin: WDL except, craniotomy incision which is open to air    Pain: Pain managed with PRN tylenol    CMS: Intact, AOx4.    Dressing:     Diet: Regular diet. Denies nausea/vomiting.   BG checks before meals and at bedtime. BG check at bedtime   LDA: None    Equipment: personal belongings,    Plan: Continue with plan of care. Call light within reach, pt able to make needs known.    Additional Info: Safety rounds completed. Family at bedside throughout the shift.           "

## 2024-11-09 NOTE — PLAN OF CARE
Discharge Planner Post-Acute Rehab OT:      Discharge Plan: Home with assistance vs TCU     Precautions: Falls, Craniectomy - helmet OOB, LUE/LLE hemiparesis, L neglect.  *Safe sitting plan     Current Status:  ADLs:  Mobility: A x 2 bed mobility. Lift transfer. Wheelchair based in Broda chair.   Grooming: Min A seated.  Dressing: UB A x 2 seated in chair. LB A x 2 supine.   Bathing: liko lift Ax2 to/from purple shower chair/commode. Assistance with washing/rinsing/drying 6/10 body parts.   Toileting: Liko lift Ax2 to/from purple dependent shower chair/commode. Dependent hygiene/clothing.  IADLs: PLOF IND. Recommend assist.  Vision/Cognition: Full time corrective lenses. L neglect, possible peripheral field deficits. Moderate cognitive deficits attention, recall, comprehension, problem solving.      Assessment: FES used for LUE.    Skilled set up on :  Pt dependent for proper placement of electrodes on LUE for optimum muscle contraction, safe positioning on w/c within frame and cushion for prevention of skin breakdown, feet secured to foot pedals, w/c secured to  w/ Q-straints.  Passive motion assessed to ensure proper positioning.      Pt performed 20 minutes of active FES ergometry with 100% stimulation applied to above muscles at 30 rpm with .5 Nm resistance.  This OT adjusted e-stim and cycling parameters in real-time to ensure palpable muscle contractions throughout session.      Changes in parameters that were part of this treatment:   -resistance  -pulse width, frequency  -amplitude  -pedal speed      Functional outcomes from this intervention include:   -reduced spasticity  -improved sensory awareness and proprioception  -improved muscle strength  -improved motor coordination      Other Barriers to Discharge (DME, Family Training, etc):   Home set up: 1 LUPE. 16 stairs to access bedroom/bathroom. Can stay on main level with bedroom/bathroom but no shower.   Caregiver support: Partner working  remotely, involved and present on unit.   Equipment needs pending progress.

## 2024-11-09 NOTE — PLAN OF CARE
Discharge Plan: home w/ assist. HHPT    Precautions: fall, crani, helmet OOB. Do not leave alone EOB/commode.    Current Status:  Bed Mobility: Mod-A L, Max-A R rolling and supine<>sit  Transfer: Liko Ax2  Gait: unable, unsafe  Stairs: unable, unsafe  Balance: Able to sit unsupported SBA to min-A. Dynamic posterior/L lean, pt works to correct and is aware of midline    Outcome Measures:   PASS    11/5: 0/36  Rice   11/5: 0/56    Assessment: Performed well with static sitting EOM. Difficulty with controlled anterior L reaching, improved throughout session.    Other Barriers to Discharge (DME, Family Training, etc):   1 LUPE (threshold) + 15STI to access bedroom/bathroom. Option for main floor living in [facility].     DME: pending progress

## 2024-11-09 NOTE — PLAN OF CARE
Goal Outcome Evaluation:      Plan of Care Reviewed With: patient, significant other    Overall Patient Progress: no changeOverall Patient Progress: no change      Orientation:A/O x4, forgetful.  VSS on RA with slightly elevated bps. Scheduled bp meds given.    Bowel: Patient had BM this evening after bowel program.  LBM 11/8  Bladder: incont x1 this shift.  Primofit placed at HS.    Pain:c/o right shoulder/ chest pain.  MD notified. No pain radiating anywhere. EKG wnl.  Prn bengay applied and helped. Prn tylenol given x2.  New scheduled muscle relaxer give at HS.  Ambulation/Transfers: up with assist two with lift. Helmet on when up.    Blood sugars:before dinner 114 and .   Diet/ Liquids:Regular thin pills whole.  Feed assist set up.    Skin: intact, cranial incision open to air approximated.   Bed alarm on for safety, call light within reach. Continue with POC.

## 2024-11-09 NOTE — PLAN OF CARE
Goal Outcome Evaluation:      Plan of Care Reviewed With: patient    Overall Patient Progress: no changeOverall Patient Progress: no change       Pt. alert oriented x4, can be forgetful. C/O R shoulder pain PRN Tylenol given was effective. Denies SOB N/V and chest pain. Pt. Observed not sleeping good this shift, uses bedpan 3x, Mix continent of BM. Incontinent of bladder uses primofit. A2 liko for transfer. Safety measures in place. Call light within reach. Safety rounds completed. Will continue with current POC.

## 2024-11-10 ENCOUNTER — APPOINTMENT (OUTPATIENT)
Dept: PHYSICAL THERAPY | Facility: CLINIC | Age: 49
DRG: 057 | End: 2024-11-10
Attending: PHYSICAL MEDICINE & REHABILITATION
Payer: COMMERCIAL

## 2024-11-10 ENCOUNTER — APPOINTMENT (OUTPATIENT)
Dept: OCCUPATIONAL THERAPY | Facility: CLINIC | Age: 49
DRG: 057 | End: 2024-11-10
Attending: PHYSICAL MEDICINE & REHABILITATION
Payer: COMMERCIAL

## 2024-11-10 LAB
GLUCOSE BLDC GLUCOMTR-MCNC: 101 MG/DL (ref 70–99)
GLUCOSE BLDC GLUCOMTR-MCNC: 113 MG/DL (ref 70–99)
GLUCOSE BLDC GLUCOMTR-MCNC: 145 MG/DL (ref 70–99)
GLUCOSE BLDC GLUCOMTR-MCNC: 86 MG/DL (ref 70–99)
GLUCOSE BLDC GLUCOMTR-MCNC: 98 MG/DL (ref 70–99)

## 2024-11-10 PROCEDURE — 250N000011 HC RX IP 250 OP 636

## 2024-11-10 PROCEDURE — 250N000013 HC RX MED GY IP 250 OP 250 PS 637

## 2024-11-10 PROCEDURE — 97112 NEUROMUSCULAR REEDUCATION: CPT | Mod: GP

## 2024-11-10 PROCEDURE — 250N000013 HC RX MED GY IP 250 OP 250 PS 637: Performed by: PHYSICAL MEDICINE & REHABILITATION

## 2024-11-10 PROCEDURE — 97530 THERAPEUTIC ACTIVITIES: CPT | Mod: GO | Performed by: OCCUPATIONAL THERAPIST

## 2024-11-10 PROCEDURE — 97112 NEUROMUSCULAR REEDUCATION: CPT | Mod: GO | Performed by: OCCUPATIONAL THERAPIST

## 2024-11-10 PROCEDURE — 97530 THERAPEUTIC ACTIVITIES: CPT | Mod: GP

## 2024-11-10 PROCEDURE — 128N000003 HC R&B REHAB

## 2024-11-10 RX ADMIN — Medication: at 21:36

## 2024-11-10 RX ADMIN — ACETAMINOPHEN 650 MG: 325 TABLET, FILM COATED ORAL at 22:21

## 2024-11-10 RX ADMIN — LOSARTAN POTASSIUM 50 MG: 50 TABLET, FILM COATED ORAL at 08:20

## 2024-11-10 RX ADMIN — AMLODIPINE BESYLATE 10 MG: 10 TABLET ORAL at 08:20

## 2024-11-10 RX ADMIN — THIAMINE HCL TAB 100 MG 100 MG: 100 TAB at 08:20

## 2024-11-10 RX ADMIN — Medication: at 16:40

## 2024-11-10 RX ADMIN — INSULIN GLARGINE 7 UNITS: 100 INJECTION, SOLUTION SUBCUTANEOUS at 22:13

## 2024-11-10 RX ADMIN — METHOCARBAMOL 500 MG: 500 TABLET ORAL at 20:41

## 2024-11-10 RX ADMIN — POLYETHYLENE GLYCOL 3350 17 G: 17 POWDER, FOR SOLUTION ORAL at 20:45

## 2024-11-10 RX ADMIN — ACETAMINOPHEN 650 MG: 325 TABLET, FILM COATED ORAL at 01:46

## 2024-11-10 RX ADMIN — SENNOSIDES 2 TABLET: 8.6 TABLET, FILM COATED ORAL at 08:21

## 2024-11-10 RX ADMIN — Medication: at 05:26

## 2024-11-10 RX ADMIN — Medication: at 19:24

## 2024-11-10 RX ADMIN — CLOPIDOGREL BISULFATE 75 MG: 75 TABLET ORAL at 08:20

## 2024-11-10 RX ADMIN — ACETAMINOPHEN 650 MG: 325 TABLET, FILM COATED ORAL at 16:40

## 2024-11-10 RX ADMIN — LOSARTAN POTASSIUM 50 MG: 50 TABLET, FILM COATED ORAL at 20:41

## 2024-11-10 RX ADMIN — ATORVASTATIN CALCIUM 40 MG: 40 TABLET, FILM COATED ORAL at 20:41

## 2024-11-10 RX ADMIN — PANTOPRAZOLE SODIUM 40 MG: 40 TABLET, DELAYED RELEASE ORAL at 05:25

## 2024-11-10 RX ADMIN — ASPIRIN 81 MG CHEWABLE TABLET 81 MG: 81 TABLET CHEWABLE at 08:20

## 2024-11-10 RX ADMIN — ENOXAPARIN SODIUM 40 MG: 40 INJECTION SUBCUTANEOUS at 13:18

## 2024-11-10 RX ADMIN — INSULIN GLARGINE 7 UNITS: 100 INJECTION, SOLUTION SUBCUTANEOUS at 08:23

## 2024-11-10 RX ADMIN — Medication 6.25 MG: at 22:21

## 2024-11-10 RX ADMIN — LIDOCAINE 1 PATCH: 4 PATCH TOPICAL at 20:45

## 2024-11-10 RX ADMIN — METOPROLOL TARTRATE 50 MG: 50 TABLET, FILM COATED ORAL at 20:41

## 2024-11-10 RX ADMIN — BISACODYL 10 MG: 10 SUPPOSITORY RECTAL at 18:18

## 2024-11-10 RX ADMIN — Medication 5 MG: at 20:41

## 2024-11-10 RX ADMIN — METOPROLOL TARTRATE 50 MG: 50 TABLET, FILM COATED ORAL at 08:30

## 2024-11-10 RX ADMIN — FAMOTIDINE 20 MG: 20 TABLET ORAL at 20:41

## 2024-11-10 RX ADMIN — ACETAMINOPHEN, CAFFEINE 1 TABLET: 500; 65 TABLET, FILM COATED ORAL at 08:20

## 2024-11-10 RX ADMIN — FAMOTIDINE 20 MG: 20 TABLET ORAL at 08:21

## 2024-11-10 NOTE — PROGRESS NOTES
Skilled set up on :  Pt dependent for proper placement of electrodes on L TA, gastroc, hamstring, quadriceps for optimum muscle contraction, safe positioning on w/c within frame and cushion for prevention of skin breakdown, feet secured to foot pedals, w/c secured to  w/ Q-straints.  Passive motion assessed to ensure proper positioning.       Pt performed 26 minutes of active FES ergometry with 100% stimulation applied to above muscles at 35 rpm with 1.99 Nm resistance.  This PT adjusted e-stim and cycling parameters in real-time to ensure palpable muscle contractions throughout session.       Changes in parameters that were part of this treatment:   - HS turned off due to tolerance - resistance titrated with varying effort levels to smooth pedaling        Functional outcomes from this intervention include:   -reduced spasticity  -improved sensory awareness and proprioception  -improved muscle strength  -improved motor coordination     Session Summary:   -Average Power: 3.4W  -Asymmetry: R 3%  -Active Minutes: 15

## 2024-11-10 NOTE — PLAN OF CARE
Discharge Planner Post-Acute Rehab OT:      Discharge Plan: Home with assistance vs TCU     Precautions: Falls, Craniectomy - helmet OOB, LUE/LLE hemiparesis, L neglect.  *Safe sitting plan     Current Status:  ADLs:  Mobility: A x 2 bed mobility. Lift transfer. Wheelchair based in Broda chair.   Grooming: Min A seated.  Dressing: UB A x 2 seated in chair. LB A x 2 supine.   Bathing: liko lift Ax2 to/from purple shower chair/commode. Assistance with washing/rinsing/drying 6/10 body parts.   Toileting: Liko lift Ax2 to/from purple dependent shower chair/commode. Dependent hygiene/clothing.  IADLs: PLOF IND. Recommend assist.  Vision/Cognition: Full time corrective lenses. L neglect, possible peripheral field deficits. Moderate cognitive deficits attention, recall, comprehension, problem solving.      Assessment: Pt working on dynamic standing reaching and tending to L with blaze pods in first OT session. Second OT session focusing on functional cognition with med task. Pt initially earning a 1/5, but able to correct errors to 3/5 with moderate prompting. See flowsheet for details.     Other Barriers to Discharge (DME, Family Training, etc):   Home set up: 1 LUPE. 16 stairs to access bedroom/bathroom. Can stay on main level with bedroom/bathroom but no shower.   Caregiver support: Partner working remotely, involved and present on unit.   Equipment needs pending progress.

## 2024-11-10 NOTE — PLAN OF CARE
"Goal Outcome Evaluation:      Plan of Care Reviewed With: patient    Overall Patient Progress: no changeOverall Patient Progress: no change        VS:  BP (!) 142/92 (BP Location: Right arm, Patient Position: Semi-Vargas's, Cuff Size: Adult Large)   Pulse 84   Temp 98.5  F (36.9  C) (Oral)   Resp 18   Ht 1.753 m (5' 9\")   Wt 102 kg (224 lb 13.9 oz)   SpO2 99%   BMI 33.21 kg/m       O2: SpO2 > 99 and stable on RA. Denies chest pain and SOB.    Output: Voids spontaneously without difficulty. Primofit on during the shift    Last BM: 11/9 denies abdominal discomfort. BS active / passing flatus.    Activity: Up with A2 LIKO    Skin: WDL except, craniotomy incision which is open to air    Pain: Pain managed with PRN tylenol    CMS: Intact, AOx4.    Dressing:     Diet: Regular diet. Denies nausea/vomiting.   BG checks before meals and at bedtime. BG check at bedtime   LDA: None    Equipment: personal belongings,    Plan: Continue with plan of care. Call light within reach, pt able to make needs known.    Additional Info: Safety rounds completed. Family at bedside throughout the shift.          "

## 2024-11-10 NOTE — PLAN OF CARE
Discharge Plan: home w/ assist. HHPT     Precautions: fall, crani, helmet OOB. Do not leave alone EOB/commode.     Current Status:  Bed Mobility: Mod-A L, Max-A R rolling and supine<>sit  Transfer: Liko Ax2  Gait: unable, unsafe  Stairs: unable, unsafe  Balance: Able to sit unsupported SBA to min-A. Dynamic posterior/L lean, pt works to correct and is aware of midline     Outcome Measures:   PASS               11/5: 0/36  Rice              11/5: 0/56     Assessment: Reaching R & L stacking cones, in Broda chair, reduced trunk control with L reach. Sitting edge of mat with static sitting/midline self correction with mirror feedback and cues, progressing to trunk control activities including small lateral leans and trunk control with R LE movements, with Maria L-CGA for all.      Other Barriers to Discharge (DME, Family Training, etc):   1 LUPE (threshold) + 15STI to access bedroom/bathroom. Option for main floor living in [facility].      DME: pending progress

## 2024-11-10 NOTE — PLAN OF CARE
Goal Outcome Evaluation:      Plan of Care Reviewed With: patient    Overall Patient Progress: no change    Pt. alert and oriented x4. Mother at the bedside involved w/ cares. Pt. complained of right shoulder pain, managed w/ Tylenol.  Mixed incontinence, uses primofit at night. Pt has a lidocaine patch at right shoulder, intact. Call light w/in reach. Continue POC.       Patient's most recent vital signs are:     Vital signs:  BP: 156/96  Temp: 98.5  HR: 84  RR: 16  SpO2: 99 %     Patient does not have new respiratory symptoms.  Patient does not have new sore throat.  Patient does not have a fever greater than 99.5.

## 2024-11-10 NOTE — PLAN OF CARE
Goal Outcome Evaluation:      Plan of Care Reviewed With: patient, parent  Overall Patient Progress: no change  Overall Patient Progress: no change        Orientation: AOX4  Bowel: Incontinent: given suppository and on bowel program @ 1930H  LBM: 11-9-24; Medium, Brown, Formed  Bladder: Mixed Continence  Pain: Rated generalized pain as 2/10; Given PRN Tylenol 650 mg @ 2050H; Scheduled Meds given  Ambulation/Transfers: AX2 w/ Liko Lift  Blood sugars: 109 mg/dl @ 2120H  Diet/Liquids: Combination regular Diet; Thin Liquids  Tubes/Lines/Drains: Primofit attached  Skin:  WDL      Continue POC

## 2024-11-11 ENCOUNTER — APPOINTMENT (OUTPATIENT)
Dept: OCCUPATIONAL THERAPY | Facility: CLINIC | Age: 49
DRG: 057 | End: 2024-11-11
Attending: PHYSICAL MEDICINE & REHABILITATION
Payer: COMMERCIAL

## 2024-11-11 ENCOUNTER — APPOINTMENT (OUTPATIENT)
Dept: SPEECH THERAPY | Facility: CLINIC | Age: 49
DRG: 057 | End: 2024-11-11
Attending: PHYSICAL MEDICINE & REHABILITATION
Payer: COMMERCIAL

## 2024-11-11 ENCOUNTER — APPOINTMENT (OUTPATIENT)
Dept: PHYSICAL THERAPY | Facility: CLINIC | Age: 49
DRG: 057 | End: 2024-11-11
Attending: PHYSICAL MEDICINE & REHABILITATION
Payer: COMMERCIAL

## 2024-11-11 LAB
ANION GAP SERPL CALCULATED.3IONS-SCNC: 11 MMOL/L (ref 7–15)
ATRIAL RATE - MUSE: 76 BPM
BUN SERPL-MCNC: 7.2 MG/DL (ref 6–20)
CALCIUM SERPL-MCNC: 9.3 MG/DL (ref 8.8–10.4)
CHLORIDE SERPL-SCNC: 105 MMOL/L (ref 98–107)
CREAT SERPL-MCNC: 0.7 MG/DL (ref 0.67–1.17)
DIASTOLIC BLOOD PRESSURE - MUSE: NORMAL MMHG
EGFRCR SERPLBLD CKD-EPI 2021: >90 ML/MIN/1.73M2
ERYTHROCYTE [DISTWIDTH] IN BLOOD BY AUTOMATED COUNT: 16.2 % (ref 10–15)
GLUCOSE BLDC GLUCOMTR-MCNC: 112 MG/DL (ref 70–99)
GLUCOSE BLDC GLUCOMTR-MCNC: 132 MG/DL (ref 70–99)
GLUCOSE BLDC GLUCOMTR-MCNC: 156 MG/DL (ref 70–99)
GLUCOSE BLDC GLUCOMTR-MCNC: 95 MG/DL (ref 70–99)
GLUCOSE SERPL-MCNC: 93 MG/DL (ref 70–99)
HCO3 SERPL-SCNC: 24 MMOL/L (ref 22–29)
HCT VFR BLD AUTO: 34.8 % (ref 40–53)
HGB BLD-MCNC: 11.4 G/DL (ref 13.3–17.7)
INTERPRETATION ECG - MUSE: NORMAL
MCH RBC QN AUTO: 26.1 PG (ref 26.5–33)
MCHC RBC AUTO-ENTMCNC: 32.8 G/DL (ref 31.5–36.5)
MCV RBC AUTO: 80 FL (ref 78–100)
P AXIS - MUSE: 43 DEGREES
PLATELET # BLD AUTO: 213 10E3/UL (ref 150–450)
POTASSIUM SERPL-SCNC: 3.9 MMOL/L (ref 3.4–5.3)
PR INTERVAL - MUSE: 184 MS
QRS DURATION - MUSE: 90 MS
QT - MUSE: 364 MS
QTC - MUSE: 409 MS
R AXIS - MUSE: 11 DEGREES
RBC # BLD AUTO: 4.37 10E6/UL (ref 4.4–5.9)
SODIUM SERPL-SCNC: 140 MMOL/L (ref 135–145)
SYSTOLIC BLOOD PRESSURE - MUSE: NORMAL MMHG
T AXIS - MUSE: 26 DEGREES
VENTRICULAR RATE- MUSE: 76 BPM
WBC # BLD AUTO: 4.1 10E3/UL (ref 4–11)

## 2024-11-11 PROCEDURE — 250N000013 HC RX MED GY IP 250 OP 250 PS 637: Performed by: PHYSICAL MEDICINE & REHABILITATION

## 2024-11-11 PROCEDURE — 99232 SBSQ HOSP IP/OBS MODERATE 35: CPT | Mod: GC | Performed by: PHYSICAL MEDICINE & REHABILITATION

## 2024-11-11 PROCEDURE — 128N000003 HC R&B REHAB

## 2024-11-11 PROCEDURE — 97112 NEUROMUSCULAR REEDUCATION: CPT | Mod: GP

## 2024-11-11 PROCEDURE — 82374 ASSAY BLOOD CARBON DIOXIDE: CPT

## 2024-11-11 PROCEDURE — 85014 HEMATOCRIT: CPT

## 2024-11-11 PROCEDURE — 250N000011 HC RX IP 250 OP 636

## 2024-11-11 PROCEDURE — 97130 THER IVNTJ EA ADDL 15 MIN: CPT | Mod: GN

## 2024-11-11 PROCEDURE — 82435 ASSAY OF BLOOD CHLORIDE: CPT

## 2024-11-11 PROCEDURE — 97112 NEUROMUSCULAR REEDUCATION: CPT | Mod: GO | Performed by: OCCUPATIONAL THERAPIST

## 2024-11-11 PROCEDURE — 97129 THER IVNTJ 1ST 15 MIN: CPT | Mod: GN

## 2024-11-11 PROCEDURE — 250N000013 HC RX MED GY IP 250 OP 250 PS 637

## 2024-11-11 PROCEDURE — 97530 THERAPEUTIC ACTIVITIES: CPT | Mod: GP

## 2024-11-11 PROCEDURE — 85041 AUTOMATED RBC COUNT: CPT

## 2024-11-11 PROCEDURE — 36415 COLL VENOUS BLD VENIPUNCTURE: CPT

## 2024-11-11 PROCEDURE — 80048 BASIC METABOLIC PNL TOTAL CA: CPT

## 2024-11-11 RX ORDER — FAMOTIDINE 20 MG/1
20 TABLET, FILM COATED ORAL 2 TIMES DAILY PRN
Status: DISCONTINUED | OUTPATIENT
Start: 2024-11-11 | End: 2024-11-22 | Stop reason: HOSPADM

## 2024-11-11 RX ADMIN — ACETAMINOPHEN, CAFFEINE 1 TABLET: 500; 65 TABLET, FILM COATED ORAL at 07:55

## 2024-11-11 RX ADMIN — ACETAMINOPHEN 650 MG: 325 TABLET, FILM COATED ORAL at 16:55

## 2024-11-11 RX ADMIN — CLOPIDOGREL BISULFATE 75 MG: 75 TABLET ORAL at 08:00

## 2024-11-11 RX ADMIN — FAMOTIDINE 20 MG: 20 TABLET ORAL at 08:00

## 2024-11-11 RX ADMIN — ASPIRIN 81 MG CHEWABLE TABLET 81 MG: 81 TABLET CHEWABLE at 08:00

## 2024-11-11 RX ADMIN — ACETAMINOPHEN 650 MG: 325 TABLET, FILM COATED ORAL at 07:52

## 2024-11-11 RX ADMIN — ATORVASTATIN CALCIUM 40 MG: 40 TABLET, FILM COATED ORAL at 20:14

## 2024-11-11 RX ADMIN — INSULIN GLARGINE 7 UNITS: 100 INJECTION, SOLUTION SUBCUTANEOUS at 20:14

## 2024-11-11 RX ADMIN — ACETAMINOPHEN 650 MG: 325 TABLET, FILM COATED ORAL at 20:56

## 2024-11-11 RX ADMIN — THIAMINE HCL TAB 100 MG 100 MG: 100 TAB at 08:00

## 2024-11-11 RX ADMIN — PANTOPRAZOLE SODIUM 40 MG: 40 TABLET, DELAYED RELEASE ORAL at 06:31

## 2024-11-11 RX ADMIN — LOSARTAN POTASSIUM 50 MG: 50 TABLET, FILM COATED ORAL at 08:00

## 2024-11-11 RX ADMIN — BISACODYL 10 MG: 10 SUPPOSITORY RECTAL at 18:47

## 2024-11-11 RX ADMIN — ENOXAPARIN SODIUM 40 MG: 40 INJECTION SUBCUTANEOUS at 13:45

## 2024-11-11 RX ADMIN — SENNOSIDES 2 TABLET: 8.6 TABLET, FILM COATED ORAL at 08:00

## 2024-11-11 RX ADMIN — Medication 5 MG: at 20:14

## 2024-11-11 RX ADMIN — INSULIN GLARGINE 7 UNITS: 100 INJECTION, SOLUTION SUBCUTANEOUS at 08:00

## 2024-11-11 RX ADMIN — Medication 6.25 MG: at 20:21

## 2024-11-11 RX ADMIN — METHOCARBAMOL 500 MG: 500 TABLET ORAL at 20:14

## 2024-11-11 RX ADMIN — LIDOCAINE 1 PATCH: 4 PATCH TOPICAL at 20:12

## 2024-11-11 RX ADMIN — METOPROLOL TARTRATE 50 MG: 50 TABLET, FILM COATED ORAL at 08:00

## 2024-11-11 RX ADMIN — AMLODIPINE BESYLATE 10 MG: 10 TABLET ORAL at 08:00

## 2024-11-11 NOTE — PROGRESS NOTES
[facility]   Acute Rehabilitation Unit  Daily Progress Note    INTERVAL HISTORY  Notes and labs reviewed over past 24 hours. No acute overnight events reported.   has no acute concerns today.  He reports that he is been able to sleep better with the addition of his as needed Robaxin for management of right sided shoulder pain.  His headaches have become less frequent, and he feels that the shooting sensation in his left wrist has become more infrequent as well.  He continues to experience some pain in the right shoulder throughout the day, as well as some chronic left knee pain. We also discussed his reflux, which he believes will be adequately treated without both pantoprazole and pepcid together.     We additionally discussed the patient's cardiac medications for treatment of HFmrEF.  He experiencing orthostasis on the tilt table with physical therapy, however we discussed that due to his hypertension, we would be best off continuing these medications.      ROS: 10 point ROS was assessed and was negative unless otherwise stated in HPI    Functional  PT: 11/11  Bed Mobility: Mod-A L, Max-A R rolling and supine<>sit  Transfer: Liko Ax2  Gait: unable, unsafe  Stairs: unable, unsafe  Balance: Able to sit unsupported SBA to min-A. Dynamic posterior/L lean, pt works to correct and is aware of midline  Outcome Measures:   PASS 11/5: 0/36  Rice 11/5: 0/56     OT: 11/11  ADLs:  Mobility: A x 2 bed mobility. Lift transfer. Wheelchair based in Broda chair.   Grooming: Min A seated.  Dressing: UB A x 2 seated in chair. LB A x 2 supine.   Bathing: liko lift Ax2 to/from purple shower chair/commode. Assistance with washing/rinsing/drying 6/10 body parts.   Toileting: Liko lift Ax2 to/from purple dependent shower chair/commode. Dependent hygiene/clothing.  IADLs: PLOF IND. Recommend assist.  Vision/Cognition: Full time corrective lenses. L neglect, possible peripheral field deficits.  Moderate cognitive deficits attention, recall, comprehension, problem solving.      SLP: 11/11  Hearing: WFL  Vision: glasses; significant visual impairment (baseline per patient) as well as left neglect/inattention  Communication: Mild dysarthria; speech intelligibility 80% in conversation  Cognition: Moderate cogntiive-communication impairment in areas of attention, memory, executive function and visuospatial skills.   Swallow: Swallow eval 11/6 - continue regular diet and thin liquids - set up assist as needed, reminder for upright position/chair position is ideal, small bites/sips, slow rate, and alternate solids and liquids. No further swallow goals at this time as pt is independently utilizing strategies for safe and efficient oral intake.     MEDICATIONS  Scheduled meds  Current Facility-Administered Medications   Medication Dose Route Frequency Provider Last Rate Last Admin    acetaminophen-caffeine (EXCEDRIN TENSION HEADACHE) 500-65 MG tablet 1 tablet  1 tablet Oral Daily Angel Vargas DO   1 tablet at 11/10/24 0820    amLODIPine (NORVASC) tablet 10 mg  10 mg Oral Daily Yessica Villalobos MD   10 mg at 11/10/24 0820    aspirin (ASA) chewable tablet 81 mg  81 mg Oral Daily Zac Hurley MD   81 mg at 11/10/24 0820    atorvastatin (LIPITOR) tablet 40 mg  40 mg Oral QPM Zac Hurley MD   40 mg at 11/10/24 2041    bisacodyl (DULCOLAX) suppository 10 mg  10 mg Rectal Daily Angel Vargas,    10 mg at 11/10/24 1818    clopidogrel (PLAVIX) tablet 75 mg  75 mg Oral Daily Zac Hurley MD   75 mg at 11/10/24 0820    enoxaparin ANTICOAGULANT (LOVENOX) injection 40 mg  40 mg Subcutaneous Q24H Zac Hurley MD   40 mg at 11/10/24 1318    famotidine (PEPCID) tablet 20 mg  20 mg Oral BID Zac Hurley MD   20 mg at 11/10/24 2041    insulin aspart (NovoLOG) injection (RAPID ACTING)  1-3 Units Subcutaneous TID AC Zac Hurley MD        insulin aspart (NovoLOG) injection (RAPID ACTING)  1-3  Units Subcutaneous At Bedtime Zac Hurley MD        insulin glargine (LANTUS PEN) injection 7 Units  7 Units Subcutaneous QAM  Zac Hurley MD   7 Units at 11/10/24 0823    insulin glargine (LANTUS PEN) injection 7 Units  7 Units Subcutaneous At Bedtime Zac Hurley MD   7 Units at 11/10/24 2213    Lidocaine (LIDOCARE) 4 % Patch 1 patch  1 patch Transdermal Q24h Angel Vargas DO   1 patch at 11/10/24 2045    losartan (COZAAR) tablet 50 mg  50 mg Oral BID Yessica Villalobos MD   50 mg at 11/10/24 2041    melatonin tablet 5 mg  5 mg Oral At Bedtime Angel Vargas DO   5 mg at 11/10/24 2041    methocarbamol (ROBAXIN) tablet 500 mg  500 mg Oral At Bedtime Angel Vargas DO   500 mg at 11/10/24 2041    metoprolol tartrate (LOPRESSOR) tablet 50 mg  50 mg Oral BID Yessica Villalobos MD   50 mg at 11/10/24 2041    pantoprazole (PROTONIX) EC tablet 40 mg  40 mg Oral QAM  Yessica Villalobos MD   40 mg at 11/11/24 0631    polyethylene glycol (MIRALAX) Packet 17 g  17 g Oral Daily Zac Hurley MD   17 g at 11/10/24 2045    sennosides (SENOKOT) tablet 2 tablet  2 tablet Oral Daily Zac Hurley MD   2 tablet at 11/10/24 0821    thiamine (B-1) tablet 100 mg  100 mg Oral Daily Zac Hurley MD   100 mg at 11/10/24 0820       PRN meds:  Current Facility-Administered Medications   Medication Dose Route Frequency Provider Last Rate Last Admin    acetaminophen (TYLENOL) tablet 650 mg  650 mg Oral Q4H PRN Zac Hurley MD   650 mg at 11/10/24 2221    glucose gel 15-30 g  15-30 g Oral Q15 Min PRN Zac Hurley MD        Or    dextrose 50 % injection 25-50 mL  25-50 mL Intravenous Q15 Min PRN Zac Hurley MD        Or    glucagon injection 1 mg  1 mg Subcutaneous Q15 Min PRN Zac Hurley MD        menthol (Topical Analgesic) 2.5% (BENGAY VANISHING SCENT) 2.5 % topical gel   Topical 4x Daily PRN Yessica Villalobos MD   Given at 11/10/24 6356    QUEtiapine (SEROquel) quarter-tab 6.25 mg  6.25  "mg Oral At Bedtime Zac Yi MD   6.25 mg at 11/10/24 2221         PHYSICAL EXAM  /76   Pulse 51   Temp 97.5  F (36.4  C) (Oral)   Resp 16   Ht 1.753 m (5' 9\")   Wt 102 kg (224 lb 13.9 oz)   SpO2 97%   BMI 33.21 kg/m    General: in no acute distress, conversational and alert, resting comfortably in chair  HEENT: Craniotomy incision C/D/I. nares clear, conjunctiva white  Pulmonary: on room air, no acute distress  Cardiovascular: Warm, well perfused  Abdominal: soft, non-tender to palpation, non-distended  Extremities: warm peripherally, no edema in BLEs  Skin: warm, dry, without erythema, ecchymosis, or rash noted. Craniotomy site C/D/I.   MSK: TTP at the right AC joint. Pain with cross body adduction. Negative Neers.  Negative right empty can.   Neuro: Conjugate gaze, left facial droop, Flaccid paralysis of the left upper and lower extremity. No spasticity noted in LUE/LLE testing. 1-2 beats of left ankle clonus.      LABS  Results for orders placed or performed during the hospital encounter of 11/04/24 (from the past 24 hours)   Glucose by meter   Result Value Ref Range    GLUCOSE BY METER POCT 98 70 - 99 mg/dL   Glucose by meter   Result Value Ref Range    GLUCOSE BY METER POCT 113 (H) 70 - 99 mg/dL   Glucose by meter   Result Value Ref Range    GLUCOSE BY METER POCT 86 70 - 99 mg/dL   Glucose by meter   Result Value Ref Range    GLUCOSE BY METER POCT 145 (H) 70 - 99 mg/dL   Basic metabolic panel   Result Value Ref Range    Sodium 140 135 - 145 mmol/L    Potassium 3.9 3.4 - 5.3 mmol/L    Chloride 105 98 - 107 mmol/L    Carbon Dioxide (CO2) 24 22 - 29 mmol/L    Anion Gap 11 7 - 15 mmol/L    Urea Nitrogen 7.2 6.0 - 20.0 mg/dL    Creatinine 0.70 0.67 - 1.17 mg/dL    GFR Estimate >90 >60 mL/min/1.73m2    Calcium 9.3 8.8 - 10.4 mg/dL    Glucose 93 70 - 99 mg/dL   CBC with platelets   Result Value Ref Range    WBC Count 4.1 4.0 - 11.0 10e3/uL    RBC Count 4.37 (L) 4.40 - 5.90 10e6/uL    " Hemoglobin 11.4 (L) 13.3 - 17.7 g/dL    Hematocrit 34.8 (L) 40.0 - 53.0 %    MCV 80 78 - 100 fL    MCH 26.1 (L) 26.5 - 33.0 pg    MCHC 32.8 31.5 - 36.5 g/dL    RDW 16.2 (H) 10.0 - 15.0 %    Platelet Count 213 150 - 450 10e3/uL       ASSESSMENT AND PLAN  Carl Sierra is a 49 year old left hand dominant male with past medical history of HTN and undiagnosed GLENN, who initially presented to October 2 with hypertensive emergency, left upper extremity weakness and numbness due to right MCA ischemic stroke. He underwent right craniectomy on October 5 for the management of cerebral edema and midline shift. Hospital course was complicated but he clinically improved and was transferred to ARU.     Updates Today:  -GERD: Appropriately controlled. Will trial PRN scheduling of pepcid BID. Will continue pantoprazole QD.   - Right shoulder pain: Diclofenac gel QID PRN added to be applied to the right AC joint.   - Orthostasis: Will continue to monitor.      Admission to acute inpatient rehab: 11/4/2024   Impairment group code: 01.1 Stroke      PT, OT and SLP 60 minutes of each on a daily basis, 6x a week, for 25 days,  in addition to rehab nursing and close management of physiatrist.     Impairment of ADL's:  OT for 60 min daily, 6x a week for 25 days, to work on upper and lower body self care, dressing, toileting, bathing, energy conservation techniques with use of ADs as needed   Impairment of mobility:   PT for 60 min daily, 6x a week for 25 days, to work on strengthening, endurance buildup, transfers and most likely WC mobility.   Impairment of cognition/language/swallow:   SLP for 60 min daily, 6x a week  for cognitive evaluation and treatment strategies for higher level cognitive deficits and memory impairment   Rehab RN to administer medication, patient education on medication taking, VS monitoring, bowel regimen, glucose monitoring and wound care/surgical wound dressing changes and monitoring.      Medical Conditions  # Right  MCA ischemic stroke s/p TNK (10/2/2024)  # Cerebral edema s/p decompressive craniectomy 10/5  Patient presented to [facility] on 10/2 with hypertensive emergency, left upper extremity numbness and weakness.  Stroke code was initiated on arrival and CTA head/neck revealed Short segment severe stenosis of the posterior branch of the M2 segment of the right MCA. Decision was made to give tenecteplase.  This did not significantly improve his symptoms.  Brain MRI additionally revealed small right posterior cerebellar stroke and multiple infarcts. Completed 7 day course of [facility]   Discharging team and neurosurgery  recommended low dose caffeine or modafinil to help with alertness if needed  - continue ASA and plavix  - consider modafinil.   - continue acetaminophen-caffeine (Excedrin Tension Headache) 500-65 mg tablet daily (was started on 10/25)    - stroke secondary prevention as below  - surgical wound healing well; staples were removed 10/21  - helmet when OOB   - follow up with neurosurgery for bone flap replacement - [facility]  (Would be direct admission day prior for lumbar drain placement and bone flap replacement the following day.)  - follow up stroke neurology and PM&R as outpatient       # Left Sided Hemiplegia, Flaccid  Patient reports persistent left sided weakness as well as nighttime pain and spasms in his extremities. Examination significant for flaccid paralysis of left upper and lower extremity with complete loss of tone.    - Monitor for spasms  - Consider gabapentin      # HTN  Not managed prior to admission . Has had intermittent hypertension while at ARU.   - continue metoprolol 50 bid   - continue losartan 50 bid (was dosed bid for better control of morning elevation in BP and can be changed to daily dosing)  - amlodipine 10 daily      # HLD  LDL 98 on 10/3.   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   - continue prn O2  - formal sleep study as outpatient       # Hyponatremia, Resolving  Resolved per discharging team; it was 135 and slightly low on 11/4  - Will continue to monitor with qMon/Thurs BMP     # Normocytic Anemia   Mild and stable, likely due to chronic disease vs CHAD.   - Will continue to monitor with qMon/Thurs CBC    # Headaches  # Neck pain   # L knee pain   Headaches seem to be well controlled on tylenol. Typically starts towards the end of the day and also responded to lying flat and hydration.   -Was using lidocaine patch and bengay for neck pain as well as prn norco. Pain seems myofascial vs AC joint related in nature with no clinical s/s of radiculopathy. L knee pain for 20 years due to sports injury and likely early OA.   - Add Robaxin 500 mg at nighttime, monitor for drowsiness   - continue tylenol 650 mg Q4H PRN   - was also on norco 0.5-1 tablet q4h prn since 10/29; was discontinued at discharge.   - reported no benefits from lidocaine so it was discontinued  - added topical bengay and diclofenac cream   - can consider TENS for the myofascial pain      # Insomnia   Per chart review, Seroquel made him agitated and was not beneficial so it was discontinued at discharge and per sign out from discharging team (last dose 11/2). However, on admission to ARU, patient states that Seroquel was beneficial.   - continue melatonin   - Seroquel 6.25 mg PO HS PRN   - consider  ramelteon      # Obesity  BMI 33.86  - nutrition consult      # Depression  # Adjustment to disability  Worsening mood in the setting of his stroke and disability.   - rehab psych consult   - spiritual health consult   - can consider selective serotonin reuptake inhibitor vs SNRI      # Neurogenic bowel  - Miralax one packet scheduled daily   - Senna BID, starting tmrw AM   -  Bisacodyl 10mg suppository QD PRN     # Neurogenic bladder:   - ok to use primofit  - will start using urinal during the day and possibly toileting using the commode pending his course       FEN: regular with thin. Continued B1 supplement.   DVT Prophylaxis: DAPT, lovenox 40 mg QD  GI Prophylaxis: Added protonix upon admission.  Code: Full - confirmed upon admission   Disposition: he will WC based and will require assistance with all ADLs and mobility; home is not accessible. Will need as ramp and possibly chair lift for stairs.   ELOS/Discharge Date:  4 weeks and most likely TCU after that before discharge to home.  Rehab prognosis:  Good   Follow up Appointments on Discharge:   Outpatient PCP in 1-2 weeks.   Outpatient PM&R in 1-2 months.  Stroke neurology   Neurosurgery    Cardiology   Sleep medicine   Psychology     --   aZc Hurley MD  [facility] PM&R PGY-2  Pager: [phone]

## 2024-11-11 NOTE — PROGRESS NOTES
Skilled set up on :  Pt dependent for proper placement of electrodes on LUE for optimum muscle contraction, safe positioning on w/c within frame and cushion for prevention of skin breakdown, feet secured to foot pedals, w/c secured to  w/ Q-straints.  Passive motion assessed to ensure proper positioning.       Pt performed 25 minutes of active FES ergometry with 0-100% stimulation applied to above muscles at .50 rpm with 35 Nm resistance. This OT adjusted e-stim and cycling parameters in real-time to ensure palpable muscle contractions throughout session.       Changes in parameters that were part of this treatment:   -resistance    Functional outcomes from this intervention include:   -reduced spasticity  -improved sensory awareness and proprioception  -improved muscle strength  -improved motor coordination

## 2024-11-11 NOTE — CONSULTS
SPIRITUAL HEALTH SERVICES Consult Note  I met with pt this morning to offer spiritual care. He shared that he is doing okay, but is looking forward to being able to get home. He did not have any other spiritual care needs today.     Chaplains will continue to be available as needed.       Cali Yarbrough M.Div.  Associate

## 2024-11-11 NOTE — PLAN OF CARE
Goal Outcome Evaluation:      Plan of Care Reviewed With: patient    Overall Patient Progress: improvingOverall Patient Progress: improving       Vitals/BGs: Stable.  Orientation: Alert and oriented x 4. Sometimes forgetful.  Pain: Prn tylenol helpful with posterior neck pain. Lidocaine patch over night helpful per pt for right shoulder pain.  Mobility: Assist of 2 with transfers using the ceiling lift. Assist of 2 with boosting up in bed and repositioning.  Diet: Tolerating regular/thing diet with good appetite.   Bladder: Continent of bladder using the urinal.   Bowel: LBM 11/10 with after dinner bowel program.  Skin/Lines: Anterior scalp incision JUANITO.  Safety: Alarms on at all times. Helmet when out of bed.

## 2024-11-11 NOTE — PROGRESS NOTES
Patient and his mother were present for stroke and heart failure education. They were engaged in education and asked appropriate questions. They state they will give the handouts to the patient's significant other to review as well.  Reviewed the following handouts and gave them a copy to keep: Learning about Risk Factors for Stroke, Learning about BE FAST, Understanding Stroke-Key resources after Stroke, A Self Help Guide for Patient's with Heart Failure.

## 2024-11-11 NOTE — PLAN OF CARE
Discharge Planner Post-Acute Rehab SLP:      Discharge Plan: TCU vs home with ongoing SLP     Precautions: fall, crani/helmet, impulsivity     Current Status:  Hearing: WFL  Vision: glasses; significant visual impairment (baseline per patient) as well as left neglect/inattention  Communication: Mild dysarthria; speech intelligibility 80% in conversation  Cognition: Moderate cogntiive-communication impairment in areas of attention, memory, executive function and visuospatial skills.   Swallow: Swallow eval 11/6 - continue regular diet and thin liquids - set up assist as needed, reminder for upright position/chair position is ideal, small bites/sips, slow rate, and alternate solids and liquids. No further swallow goals at this time as pt is independently utilizing strategies for safe and efficient oral intake.      Assessment: Engaged in LUCERO subtests- Medicine label, reading instructions, reading calendar with % IND accuracy. With min verbal cues to check errors again, able to increase to 100% accuracy. Benefitted from L visual anchor. Pt reporting that red line becoming more habitual with practice.        Other Barriers to Discharge (Family Training, etc): TBD

## 2024-11-11 NOTE — PLAN OF CARE
Goal Outcome Evaluation:      Plan of Care Reviewed With: patient    Overall Patient Progress: improvingOverall Patient Progress: improving    Patient is alert and oriented. Able to use a call light appropriately. Assist of x 2 liko Left site flaccid. Cont of JUDY, KYM 11/9. Uses a primo-fit at night with 600 ml out put. Patient complained of neck pain but refused any pain regimen that was offered. Mother spent a night in the room with the patient. Nursing staff will continue with POC.

## 2024-11-11 NOTE — PLAN OF CARE
Goal Outcome Evaluation:      Plan of Care Reviewed With: patient, parent, family  Overall Patient Progress: no change  Overall Patient Progress: no change        Orientation: AOX4  Bowel: Continent  LBM: 11-9-24  Bladder: Incontinent  Pain: Rated generalized pain and right upper back pain as 3/10; given PRN Tylenol 650 mg and scheduled meds; pain rated as 2/10  Ambulation/Transfers: AX2 w/ Liko Lift  Blood sugars: 145 mg/dl @ 2128H  Diet/Liquids: Combination Regular Diet  Tubes/Lines/Drains: Primofit on  Skin: WDL x Integrity @ scalp surgical incision JUANITO       Continue POC

## 2024-11-11 NOTE — PROGRESS NOTES
Discharge Planner Post-Acute Rehab OT:      Discharge Plan: Home with assistance vs TCU     Precautions: Falls, Craniectomy - helmet OOB, LUE/LLE hemiparesis, L neglect.  *Safe sitting plan     Current Status:  ADLs:  Mobility: A x 2 bed mobility. Lift transfer. Wheelchair based in Broda chair.   Grooming: Min A seated.  Dressing: UB A x 2 seated in chair. LB A x 2 supine.   Bathing: liko lift Ax2 to/from purple shower chair/commode. Assistance with washing/rinsing/drying 6/10 body parts.   Toileting: Liko lift Ax2 to/from purple dependent shower chair/commode. Dependent hygiene/clothing.  IADLs: PLOF IND. Recommend assist.  Vision/Cognition: Full time corrective lenses. L neglect, possible peripheral field deficits. Moderate cognitive deficits attention, recall, comprehension, problem solving.      Assessment: Utilized FES for LUE neuro re-education and sensorimotor skills; refer to care plan note for further details.      Other Barriers to Discharge (DME, Family Training, etc):   Home set up: 1 LUPE. 16 stairs to access bedroom/bathroom. Can stay on main level with bedroom/bathroom but no shower.   Caregiver support: Partner working remotely, involved and present on unit.   Equipment needs pending progress.

## 2024-11-12 ENCOUNTER — APPOINTMENT (OUTPATIENT)
Dept: SPEECH THERAPY | Facility: CLINIC | Age: 49
DRG: 057 | End: 2024-11-12
Attending: PHYSICAL MEDICINE & REHABILITATION
Payer: COMMERCIAL

## 2024-11-12 ENCOUNTER — APPOINTMENT (OUTPATIENT)
Dept: OCCUPATIONAL THERAPY | Facility: CLINIC | Age: 49
DRG: 057 | End: 2024-11-12
Attending: PHYSICAL MEDICINE & REHABILITATION
Payer: COMMERCIAL

## 2024-11-12 ENCOUNTER — APPOINTMENT (OUTPATIENT)
Dept: PHYSICAL THERAPY | Facility: CLINIC | Age: 49
DRG: 057 | End: 2024-11-12
Attending: PHYSICAL MEDICINE & REHABILITATION
Payer: COMMERCIAL

## 2024-11-12 LAB
GLUCOSE BLDC GLUCOMTR-MCNC: 101 MG/DL (ref 70–99)
GLUCOSE BLDC GLUCOMTR-MCNC: 104 MG/DL (ref 70–99)
GLUCOSE BLDC GLUCOMTR-MCNC: 122 MG/DL (ref 70–99)
GLUCOSE BLDC GLUCOMTR-MCNC: 139 MG/DL (ref 70–99)

## 2024-11-12 PROCEDURE — 97530 THERAPEUTIC ACTIVITIES: CPT | Mod: GP

## 2024-11-12 PROCEDURE — 97112 NEUROMUSCULAR REEDUCATION: CPT | Mod: GO | Performed by: OCCUPATIONAL THERAPIST

## 2024-11-12 PROCEDURE — 250N000013 HC RX MED GY IP 250 OP 250 PS 637

## 2024-11-12 PROCEDURE — 250N000011 HC RX IP 250 OP 636

## 2024-11-12 PROCEDURE — 97129 THER IVNTJ 1ST 15 MIN: CPT | Mod: GN

## 2024-11-12 PROCEDURE — 250N000013 HC RX MED GY IP 250 OP 250 PS 637: Performed by: PHYSICAL MEDICINE & REHABILITATION

## 2024-11-12 PROCEDURE — 99231 SBSQ HOSP IP/OBS SF/LOW 25: CPT | Mod: GC | Performed by: PHYSICAL MEDICINE & REHABILITATION

## 2024-11-12 PROCEDURE — 128N000003 HC R&B REHAB

## 2024-11-12 PROCEDURE — 90832 PSYTX W PT 30 MINUTES: CPT | Performed by: CLINICAL NEUROPSYCHOLOGIST

## 2024-11-12 PROCEDURE — 97130 THER IVNTJ EA ADDL 15 MIN: CPT | Mod: GN

## 2024-11-12 RX ADMIN — DICLOFENAC SODIUM TOPICAL GEL, 1% 4 G: 10 GEL TOPICAL at 13:50

## 2024-11-12 RX ADMIN — THIAMINE HCL TAB 100 MG 100 MG: 100 TAB at 08:33

## 2024-11-12 RX ADMIN — METOPROLOL TARTRATE 50 MG: 50 TABLET, FILM COATED ORAL at 08:31

## 2024-11-12 RX ADMIN — LOSARTAN POTASSIUM 50 MG: 50 TABLET, FILM COATED ORAL at 08:34

## 2024-11-12 RX ADMIN — Medication 5 MG: at 21:41

## 2024-11-12 RX ADMIN — ATORVASTATIN CALCIUM 40 MG: 40 TABLET, FILM COATED ORAL at 19:52

## 2024-11-12 RX ADMIN — ASPIRIN 81 MG CHEWABLE TABLET 81 MG: 81 TABLET CHEWABLE at 08:34

## 2024-11-12 RX ADMIN — ENOXAPARIN SODIUM 40 MG: 40 INJECTION SUBCUTANEOUS at 13:50

## 2024-11-12 RX ADMIN — METHOCARBAMOL 500 MG: 500 TABLET ORAL at 19:52

## 2024-11-12 RX ADMIN — INSULIN GLARGINE 7 UNITS: 100 INJECTION, SOLUTION SUBCUTANEOUS at 08:34

## 2024-11-12 RX ADMIN — AMLODIPINE BESYLATE 10 MG: 10 TABLET ORAL at 08:34

## 2024-11-12 RX ADMIN — METOPROLOL TARTRATE 50 MG: 50 TABLET, FILM COATED ORAL at 19:52

## 2024-11-12 RX ADMIN — SENNOSIDES 2 TABLET: 8.6 TABLET, FILM COATED ORAL at 08:33

## 2024-11-12 RX ADMIN — ACETAMINOPHEN 650 MG: 325 TABLET, FILM COATED ORAL at 11:36

## 2024-11-12 RX ADMIN — LOSARTAN POTASSIUM 50 MG: 50 TABLET, FILM COATED ORAL at 19:52

## 2024-11-12 RX ADMIN — BISACODYL 10 MG: 10 SUPPOSITORY RECTAL at 19:57

## 2024-11-12 RX ADMIN — INSULIN GLARGINE 7 UNITS: 100 INJECTION, SOLUTION SUBCUTANEOUS at 21:39

## 2024-11-12 RX ADMIN — Medication 6.25 MG: at 19:58

## 2024-11-12 RX ADMIN — LIDOCAINE 1 PATCH: 4 PATCH TOPICAL at 19:52

## 2024-11-12 RX ADMIN — CLOPIDOGREL BISULFATE 75 MG: 75 TABLET ORAL at 08:33

## 2024-11-12 RX ADMIN — ACETAMINOPHEN 650 MG: 325 TABLET, FILM COATED ORAL at 19:57

## 2024-11-12 RX ADMIN — PANTOPRAZOLE SODIUM 40 MG: 40 TABLET, DELAYED RELEASE ORAL at 05:50

## 2024-11-12 RX ADMIN — ACETAMINOPHEN, CAFFEINE 1 TABLET: 500; 65 TABLET, FILM COATED ORAL at 08:30

## 2024-11-12 RX ADMIN — ACETAMINOPHEN 650 MG: 325 TABLET, FILM COATED ORAL at 05:50

## 2024-11-12 NOTE — PLAN OF CARE
Discharge Plan: home w/ assist. HHPT     Precautions: fall, crani, helmet OOB. Do not leave alone EOB/commode.     Current Status:  Bed Mobility: Mod-A L, Max-A R rolling and supine<>sit  Transfer: Liko Ax2  Gait: unable, unsafe  Stairs: unable, unsafe  Balance: Able to sit unsupported SBA to min-A. Dynamic posterior/L lean, pt works to correct and is aware of midline     Outcome Measures:   PASS               11/5: 0/36  Rice              11/5: 0/56     Assessment: From mat sitting upright with SBA-[place]< much improved trunk control with use of mirror, some better self correction with cues for scanning to L side.  Completed sit<>stand x 5 reps with maxA at L UE and CGA at trunk with emile steady.     Other Barriers to Discharge (DME, Family Training, etc):   1 LUPE (threshold) + 15STI to access bedroom/bathroom. Option for main floor living in [facility].      DME: pending progress

## 2024-11-12 NOTE — PROGRESS NOTES
"PSYCHOLOGY PROGRESS NOTE    Start time: 0845  Stop Time: 0903  Total Duration in Minutes: 18 minutes    SUBJECTIVE:  Carl Sierra was seen today for a follow-up visit.  We discussed that he is feeling down because he has a lot of people who are depending on him for a full recovery.  He expressed some anxiety due to not knowing the future.  Validation and education was provided.  He declined wanting to talk about anything else.     Yue and his mother were present, and they declined needing any further support or education.     Symptoms reported: depression and anxiety, sleep is improved with the addition of his own pillow.     Progress toward goals: Fair.  The things Carl is depressed or anxious about are really dependent on biology - and so working towards acceptance will be paramount.     Interventions utilized:validation, education, rapport building.     OBJECTIVE: Carl was laying back in his [facility] hospital bed. Spontaneous movements were noted in his upper extremities. Some dysarthria was noted, and he responded well to asking him to repeat himself for my comprehension. He appeared to follow conversation without significant difficulty, although Renea did repeat some more complex things. Thoughts were goal-directed and linear. No SI, HI, dahlia, or confusion noted.     ASSESSMENT: Carl appears to be demonstrating relatively expected emotions following a major medical event. He does endorse some anxiety and feeling overwhelmed when looking at the \"big picture\" however, he responds well to cognitive re frames. I am a bit concerned that he will attempt to return to work too quickly, and potentially more education about this will need to be provided     DIAGNOSIS: Adjustment disorder with anxious mood  History of depression    PLAN: Plan for psychology to follow this patient approximately once per week for the duration of their rehabilitation stay.     Susan Elias PsyD, LP  Clinical Neuropsychologist      "

## 2024-11-12 NOTE — PLAN OF CARE
Discharge Planner Post-Acute Rehab SLP:      Discharge Plan: TCU vs home with ongoing SLP     Precautions: fall, crani/helmet, impulsivity     Current Status:  Hearing: WFL  Vision: glasses; significant visual impairment (baseline per patient) as well as left neglect/inattention  Communication: Mild dysarthria; speech intelligibility 80% in conversation  Cognition: Moderate cogntiive-communication impairment in areas of attention, memory, executive function and visuospatial skills.   Swallow: Swallow eval 11/6 - continue regular diet and thin liquids - set up assist as needed, reminder for upright position/chair position is ideal, small bites/sips, slow rate, and alternate solids and liquids. No further swallow goals at this time as pt is independently utilizing strategies for safe and efficient oral intake.      Assessment:   AM Session: Engaged in following 2-step written directions sheet. Required consistent verbal cueing to utilize L visual anchor to read all stimuli. With cues, able to complete activity with 90% accuracy. Letter scanning activity, pt with 95% IND accuracy (one letter missing on far left side). When unprompted to use red line from student clinician, pt began on right side and worked way over to the left.     PM Session: Number scanning/cancellation sheet. Pt aware of L red line anchor in discussion, however requiring consistent verbal cueing to utilize in functional task. Able to IND double check answers and identify missed numbers. Completed 4/4 items with 100% accuracy. Engaged in deductive reasoning puzzle. With min-mod verbal clinician support, able to complete task with 90% accuracy.        Other Barriers to Discharge (Family Training, etc): TBD

## 2024-11-12 NOTE — PROGRESS NOTES
CLINICAL NUTRITION SERVICES    Reviewed nutrition risk factors due to LOS. Pt is tolerating diet, eating well per nursing documentation (% per flowsheets). Per [employer], pt ordering 3 adequate meals/day from room service. No significant weight loss noted over past month (limited recent weight history available). No nutrition issues identified at this time. RD will follow via rounds at this time, unless consulted.     Wendy Sims RD, LD  Available via phone and Vocera  Phone: [phone]  Vocera: 5R Acute Rehab Clinical Dietitian  Weekend/Holiday Vocera: Weekend Holiday Clinical Dietitian [Multi Site Groups]

## 2024-11-12 NOTE — PROGRESS NOTES
Discharge Planner Post-Acute Rehab OT:      Discharge Plan: TCU     Precautions: Falls, Craniectomy - helmet OOB, LUE/LLE hemiparesis, L neglect.  *Safe sitting plan     Current Status:  ADLs:  Mobility: A x 2 bed mobility. Lift transfer. Wheelchair based in Broda chair.   Grooming: Min A seated.  Dressing: UB A x 2 seated in chair. LB A x 2 supine.   Bathing: liko lift Ax2 to/from purple shower chair/commode. Assistance with washing/rinsing/drying 6/10 body parts.   Toileting: Liko lift Ax2 to/from purple dependent shower chair/commode. Dependent hygiene/clothing.  IADLs: PLOF IND. Recommend assist.  Vision/Cognition: Full time corrective lenses. L neglect, possible peripheral field deficits. Moderate cognitive deficits attention, recall, comprehension, problem solving.      Assessment: Utilized Xcite for postural control; refer to care plan note for further details. Educated S.O. on use of NMES for shoulder subluxation protocol for L shoulder.     Other Barriers to Discharge (DME, Family Training, etc):   Home set up: 1 LUPE. 16 stairs to access bedroom/bathroom. Can stay on main level with bedroom/bathroom but no shower.   Caregiver support: Partner working remotely, involved and present on unit.   Equipment needs pending progress.

## 2024-11-12 NOTE — PLAN OF CARE
Goal Outcome Evaluation:      Plan of Care Reviewed With: patient    Overall Patient Progress: no changeOverall Patient Progress: no change    Orientation: alert and oriented x4  VS: stable  Pain: right shoulder and neck pain, prn tylenol given x2  Transfers: Ax2 w/ liko lift  Bowel: continent, had bm x1 after suppository, used shower chair  Bladder: incontinent of urine, Primofit placed at HS.  B and 156  Diet/ Liquids: regular/ thin, pills whole with water  Skin: intact  Misc: had shower. Prn seroquel given at HS per request  Bed alarm on for safety, call light within reach.

## 2024-11-12 NOTE — PROGRESS NOTES
[facility]   Acute Rehabilitation Unit  Daily Progress Note    INTERVAL HISTORY  Notes and labs reviewed over past 24 hours. No acute overnight events reported.  Carl reports no new concerns today. We discussed his continued BP elevation and tachycardia noted this AM. He denies any chest pain, palpitations, shortness of breath. Headaches continue to improve. Wrist pain has improved. He has not yet tried voltaren application to his right AC joint. No other concerns today. He feels that he had a productive therapy session with Dr. Lin earlier.      ROS: 10 point ROS was assessed and was negative unless otherwise stated in HPI    Functional  PT: 11/12  Bed Mobility: Mod-A L, Max-A R rolling and supine<>sit  Transfer: Liko Ax2  Gait: unable, unsafe  Stairs: unable, unsafe  Balance: Able to sit unsupported SBA to min-A. Dynamic posterior/L lean, pt works to correct and is aware of midline  Outcome Measures:   PASS 11/5: 0/36  Rice 11/5: 0/56     OT: 11/12  ADLs:  Mobility: A x 2 bed mobility. Lift transfer. Wheelchair based in Broda chair.   Grooming: Min A seated.  Dressing: UB A x 2 seated in chair. LB A x 2 supine.   Bathing: liko lift Ax2 to/from purple shower chair/commode. Assistance with washing/rinsing/drying 6/10 body parts.   Toileting: Liko lift Ax2 to/from purple dependent shower chair/commode. Dependent hygiene/clothing.  IADLs: PLOF IND. Recommend assist.  Vision/Cognition: Full time corrective lenses. L neglect, possible peripheral field deficits. Moderate cognitive deficits attention, recall, comprehension, problem solving.     SLP: 11/12  Hearing: WFL  Vision: glasses; significant visual impairment (baseline per patient) as well as left neglect/inattention  Communication: Mild dysarthria; speech intelligibility 80% in conversation  Cognition: Moderate cogntiive-communication impairment in areas of attention, memory, executive function and visuospatial skills.    Swallow: Swallow eval 11/6 - continue regular diet and thin liquids - set up assist as needed, reminder for upright position/chair position is ideal, small bites/sips, slow rate, and alternate solids and liquids. No further swallow goals at this time as pt is independently utilizing strategies for safe and efficient oral intake.     MEDICATIONS  Scheduled meds  Current Facility-Administered Medications   Medication Dose Route Frequency Provider Last Rate Last Admin    acetaminophen-caffeine (EXCEDRIN TENSION HEADACHE) 500-65 MG tablet 1 tablet  1 tablet Oral Daily Angel Vargas DO   1 tablet at 11/11/24 0755    amLODIPine (NORVASC) tablet 10 mg  10 mg Oral Daily Yessica Villalobos MD   10 mg at 11/11/24 0800    aspirin (ASA) chewable tablet 81 mg  81 mg Oral Daily Zac Hurley MD   81 mg at 11/11/24 0800    atorvastatin (LIPITOR) tablet 40 mg  40 mg Oral QPM Zac Hurley MD   40 mg at 11/11/24 2014    bisacodyl (DULCOLAX) suppository 10 mg  10 mg Rectal Daily Angel Vargas DO   10 mg at 11/11/24 1847    clopidogrel (PLAVIX) tablet 75 mg  75 mg Oral Daily Zac Hurley MD   75 mg at 11/11/24 0800    enoxaparin ANTICOAGULANT (LOVENOX) injection 40 mg  40 mg Subcutaneous Q24H Zac Hurley MD   40 mg at 11/11/24 1345    insulin aspart (NovoLOG) injection (RAPID ACTING)  1-3 Units Subcutaneous TID AC Zac Hurley MD        insulin aspart (NovoLOG) injection (RAPID ACTING)  1-3 Units Subcutaneous At Bedtime Zac Hurley MD        insulin glargine (LANTUS PEN) injection 7 Units  7 Units Subcutaneous QAM AC Zac Hurley MD   7 Units at 11/11/24 0800    insulin glargine (LANTUS PEN) injection 7 Units  7 Units Subcutaneous At Bedtime Zac Hurley MD   7 Units at 11/11/24 2014    Lidocaine (LIDOCARE) 4 % Patch 1 patch  1 patch Transdermal Q24h Angel Vargas DO   1 patch at 11/11/24 2012    losartan (COZAAR) tablet 50 mg  50 mg Oral BID Yessica Villalobos MD   50 mg at 11/11/24  "0800    melatonin tablet 5 mg  5 mg Oral At Bedtime Angel Vargas DO   5 mg at 11/11/24 2014    methocarbamol (ROBAXIN) tablet 500 mg  500 mg Oral At Bedtime Angel Vargas DO   500 mg at 11/11/24 2014    metoprolol tartrate (LOPRESSOR) tablet 50 mg  50 mg Oral BID Yessica Villalobos MD   50 mg at 11/11/24 0800    pantoprazole (PROTONIX) EC tablet 40 mg  40 mg Oral QAM AC Yessica Villalobos MD   40 mg at 11/12/24 0550    polyethylene glycol (MIRALAX) Packet 17 g  17 g Oral Daily Zac Hurley MD   17 g at 11/10/24 2045    sennosides (SENOKOT) tablet 2 tablet  2 tablet Oral Daily Zac Hurley MD   2 tablet at 11/11/24 0800    thiamine (B-1) tablet 100 mg  100 mg Oral Daily Zac Hurley MD   100 mg at 11/11/24 0800       PRN meds:  Current Facility-Administered Medications   Medication Dose Route Frequency Provider Last Rate Last Admin    acetaminophen (TYLENOL) tablet 650 mg  650 mg Oral Q4H PRN Zac Hurley MD   650 mg at 11/12/24 0550    glucose gel 15-30 g  15-30 g Oral Q15 Min PRN Zac Hurley MD        Or    dextrose 50 % injection 25-50 mL  25-50 mL Intravenous Q15 Min PRN Zac Hurley MD        Or    glucagon injection 1 mg  1 mg Subcutaneous Q15 Min PRN Zac Hurley MD        diclofenac (VOLTAREN) 1 % topical gel 4 g  4 g Topical 4x Daily PRN Zac Hurley MD        famotidine (PEPCID) tablet 20 mg  20 mg Oral BID PRN Zac Hurley MD        menthol (Topical Analgesic) 2.5% (BENGAY VANISHING SCENT) 2.5 % topical gel   Topical 4x Daily PRN Yessica Villalobos MD   Given at 11/10/24 2136    QUEtiapine (SEROquel) quarter-tab 6.25 mg  6.25 mg Oral At Bedtime PRN Zac Hurley MD   6.25 mg at 11/11/24 2021         PHYSICAL EXAM  /80 (BP Location: Right arm)   Pulse 109   Temp 98.4  F (36.9  C) (Oral)   Resp 16   Ht 1.753 m (5' 9\")   Wt 102 kg (224 lb 13.9 oz)   SpO2 99%   BMI 33.21 kg/m    General: in no acute distress, conversational and alert, resting " comfortably in chair  HEENT: Craniotomy incision C/D/I. nares clear, conjunctiva white  Pulmonary: on room air, no acute distress  Cardiovascular: Warm, well perfused  Abdominal: soft, non-tender to palpation, non-distended  Extremities: warm peripherally, no edema in BLEs  Skin: warm, dry, without erythema, ecchymosis, or rash noted. Craniotomy site C/D/I.   Neuro: Conjugate gaze, left facial droop, Flaccid paralysis of the left upper and lower extremity. No spasticity noted in LUE/LLE testing. 1-2 beats of left ankle clonus.      LABS  Results for orders placed or performed during the hospital encounter of 11/04/24 (from the past 24 hours)   Glucose by meter   Result Value Ref Range    GLUCOSE BY METER POCT 112 (H) 70 - 99 mg/dL   Glucose by meter   Result Value Ref Range    GLUCOSE BY METER POCT 132 (H) 70 - 99 mg/dL   Glucose by meter   Result Value Ref Range    GLUCOSE BY METER POCT 156 (H) 70 - 99 mg/dL   Glucose by meter   Result Value Ref Range    GLUCOSE BY METER POCT 101 (H) 70 - 99 mg/dL       ASSESSMENT AND PLAN  Carl Sierra is a 49 year old left hand dominant male with past medical history of HTN and undiagnosed GLENN, who initially presented to October 2 with hypertensive emergency, left upper extremity weakness and numbness due to right MCA ischemic stroke. He underwent right craniectomy on October 5 for the management of cerebral edema and midline shift. Hospital course was complicated but he clinically improved and was transferred to ARU.     Updates Today:  - No changes to plan of care.     Admission to acute inpatient rehab: 11/4/2024   Impairment group code: 01.1 Stroke      PT, OT and SLP 60 minutes of each on a daily basis, 6x a week, for 25 days,  in addition to rehab nursing and close management of physiatrist.     Impairment of ADL's:  OT for 60 min daily, 6x a week for 25 days, to work on upper and lower body self care, dressing, toileting, bathing, energy conservation techniques with use of  ADs as needed   Impairment of mobility:   PT for 60 min daily, 6x a week for 25 days, to work on strengthening, endurance buildup, transfers and most likely WC mobility.   Impairment of cognition/language/swallow:   SLP for 60 min daily, 6x a week  for cognitive evaluation and treatment strategies for higher level cognitive deficits and memory impairment   Rehab RN to administer medication, patient education on medication taking, VS monitoring, bowel regimen, glucose monitoring and wound care/surgical wound dressing changes and monitoring.      Medical Conditions  # Right MCA ischemic stroke s/p TNK (10/2/2024)  # Cerebral edema s/p decompressive craniectomy 10/5  Patient presented to [facility] on 10/2 with hypertensive emergency, left upper extremity numbness and weakness.  Stroke code was initiated on arrival and CTA head/neck revealed Short segment severe stenosis of the posterior branch of the M2 segment of the right MCA. Decision was made to give tenecteplase.  This did not significantly improve his symptoms.  Brain MRI additionally revealed small right posterior cerebellar stroke and multiple infarcts. Completed 7 day course of [facility]   Discharging team and neurosurgery  recommended low dose caffeine or modafinil to help with alertness if needed  - continue ASA and plavix  - consider modafinil.   - continue acetaminophen-caffeine (Excedrin Tension Headache) 500-65 mg tablet daily (was started on 10/25)    - stroke secondary prevention as below  - surgical wound healing well; staples were removed 10/21  - helmet when OOB   - follow up with neurosurgery for bone flap replacement - [facility]  (Would be direct admission day prior for lumbar drain placement and bone flap replacement the following day.)  - follow up stroke neurology and PM&R as outpatient       # Left Sided Hemiplegia, Flaccid  Patient reports persistent left sided weakness as well as nighttime pain and spasms in his  extremities. Examination significant for flaccid paralysis of left upper and lower extremity with complete loss of tone.    - Monitor for spasms  - Consider gabapentin      # HTN  Not managed prior to admission . Has had intermittent hypertension while at ARU.   - continue metoprolol 50 bid   - continue losartan 50 bid (was dosed bid for better control of morning elevation in BP and can be changed to daily dosing)  - amlodipine 10 daily      # HLD  LDL 98 on 10/3.   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   - continue prn O2  - formal sleep study as outpatient       # Hyponatremia, Resolving  Resolved per discharging team; it was 135 and slightly low on 11/4  - Will continue to monitor with qMon/Thurs BMP     # Normocytic Anemia   Mild and stable, likely due to chronic disease vs CHAD.   - Will continue to monitor with qMon/Thurs CBC    # Headaches  # Neck pain   # L knee pain   Headaches seem to be well controlled on tylenol. Typically starts towards the end of the day and also responded to lying flat and hydration.   -Was using lidocaine patch and bengay for neck pain as well as prn norco. Pain seems myofascial vs AC joint related in nature with no clinical s/s of radiculopathy. L knee pain for 20 years due to sports injury and likely early OA.   - Add Robaxin 500 mg at nighttime, monitor for drowsiness   - continue tylenol 650  mg Q4H PRN   - was also on norco 0.5-1 tablet q4h prn since 10/29; was discontinued at discharge.   - reported no benefits from lidocaine so it was discontinued  - added topical bengay and diclofenac cream   - can consider TENS for the myofascial pain      # Insomnia   Per chart review, Seroquel made him agitated and was not beneficial so it was discontinued at discharge and per sign out from discharging team (last dose 11/2). However, on admission to ARU, patient states that Seroquel was beneficial.   - continue melatonin   - Seroquel 6.25 mg PO HS PRN   - consider ramelteon      # Obesity  BMI 33.86  - nutrition consult      # Depression  # Adjustment to disability  Worsening mood in the setting of his stroke and disability.   - rehab psych consult   - spiritual health consult   - can consider selective serotonin reuptake inhibitor vs SNRI      # Neurogenic bowel  - Miralax one packet scheduled daily   - Senna BID, starting tmrw AM   -  Bisacodyl 10mg suppository QD PRN     # Neurogenic bladder:   - ok to use primofit  - will start using urinal during the day and possibly toileting using the commode pending his course       FEN: regular with thin. Continued B1 supplement.   DVT Prophylaxis: DAPT, lovenox 40 mg QD  GI Prophylaxis: Added protonix upon admission.  Code: Full - confirmed upon admission   Disposition: he will WC based and will require assistance with all ADLs and mobility; home is not accessible. Will need as ramp and possibly chair lift for stairs.   ELOS/Discharge Date:  4 weeks and most likely TCU after that before discharge to home.  Rehab prognosis:  Good   Follow up Appointments on Discharge:   Outpatient PCP in 1-2 weeks.   Outpatient PM&R in 1-2 months.  Stroke neurology   Neurosurgery    Cardiology   Sleep medicine   Psychology     --   Zac Hurley MD  [facility] PM&R PGY-2  Pager: [phone]

## 2024-11-12 NOTE — PLAN OF CARE
"Goal Outcome Evaluation:      Plan of Care Reviewed With: patient    Overall Patient Progress: improvingOverall Patient Progress: improving    VS: /68 (BP Location: Right arm)   Pulse 79   Temp 98.3  F (36.8  C) (Oral)   Resp 18   Ht 1.753 m (5' 9\")   Wt 102 kg (224 lb 13.9 oz)   SpO2 100%   BMI 33.21 kg/m       O2: SpO2 > 100% on RA    Output: Voids spontaneously without difficulty to bathroom Uses a primo fit at night   Last BM: 11/11   Activity: Up with A x 2 Liko    Skin: WDL except, Craniotomy that is open to air    Pain: Given Tylenol with relief.   CMS: Intact, Alert and oriented. Denies numbness and tingling.   Dressing: CDI   Diet: Regular diet. Thin liquids takes pills whole. Denies nausea/vomiting.    LDA: None    Equipment: None    Plan: Precautions maintained. Continue with plan of care. Call light within reach, bed alarm on, pt able to make needs known.    Additional Info: Mom spending a night in the room with the patient           "

## 2024-11-12 NOTE — PROGRESS NOTES
XCite stim unit for Activity Based Therapy. Skilled set up; determined appropriate FES parameters for each muscle group based off of strong tetanic response of muscle test.  Used the following activities from the software library: General  Intervention and patient response:Pt completed 30 repetitions x1 set of postural control with writer guiding through transitional movements

## 2024-11-13 ENCOUNTER — APPOINTMENT (OUTPATIENT)
Dept: OCCUPATIONAL THERAPY | Facility: CLINIC | Age: 49
DRG: 057 | End: 2024-11-13
Attending: PHYSICAL MEDICINE & REHABILITATION
Payer: COMMERCIAL

## 2024-11-13 ENCOUNTER — APPOINTMENT (OUTPATIENT)
Dept: SPEECH THERAPY | Facility: CLINIC | Age: 49
DRG: 057 | End: 2024-11-13
Attending: PHYSICAL MEDICINE & REHABILITATION
Payer: COMMERCIAL

## 2024-11-13 ENCOUNTER — APPOINTMENT (OUTPATIENT)
Dept: PHYSICAL THERAPY | Facility: CLINIC | Age: 49
DRG: 057 | End: 2024-11-13
Attending: PHYSICAL MEDICINE & REHABILITATION
Payer: COMMERCIAL

## 2024-11-13 LAB
APTT PPP: 29 SECONDS (ref 22–38)
GLUCOSE BLDC GLUCOMTR-MCNC: 124 MG/DL (ref 70–99)
GLUCOSE BLDC GLUCOMTR-MCNC: 125 MG/DL (ref 70–99)
GLUCOSE BLDC GLUCOMTR-MCNC: 125 MG/DL (ref 70–99)
INR PPP: 1.08 (ref 0.85–1.15)
PLATELET # BLD AUTO: 225 10E3/UL (ref 150–450)

## 2024-11-13 PROCEDURE — 36415 COLL VENOUS BLD VENIPUNCTURE: CPT | Performed by: STUDENT IN AN ORGANIZED HEALTH CARE EDUCATION/TRAINING PROGRAM

## 2024-11-13 PROCEDURE — 128N000003 HC R&B REHAB

## 2024-11-13 PROCEDURE — 85049 AUTOMATED PLATELET COUNT: CPT | Performed by: STUDENT IN AN ORGANIZED HEALTH CARE EDUCATION/TRAINING PROGRAM

## 2024-11-13 PROCEDURE — 250N000013 HC RX MED GY IP 250 OP 250 PS 637

## 2024-11-13 PROCEDURE — 250N000013 HC RX MED GY IP 250 OP 250 PS 637: Performed by: PHYSICAL MEDICINE & REHABILITATION

## 2024-11-13 PROCEDURE — 97130 THER IVNTJ EA ADDL 15 MIN: CPT | Mod: GN | Performed by: SPEECH-LANGUAGE PATHOLOGIST

## 2024-11-13 PROCEDURE — 85610 PROTHROMBIN TIME: CPT | Performed by: STUDENT IN AN ORGANIZED HEALTH CARE EDUCATION/TRAINING PROGRAM

## 2024-11-13 PROCEDURE — 97112 NEUROMUSCULAR REEDUCATION: CPT | Mod: GO | Performed by: OCCUPATIONAL THERAPIST

## 2024-11-13 PROCEDURE — 250N000011 HC RX IP 250 OP 636

## 2024-11-13 PROCEDURE — 85730 THROMBOPLASTIN TIME PARTIAL: CPT | Performed by: STUDENT IN AN ORGANIZED HEALTH CARE EDUCATION/TRAINING PROGRAM

## 2024-11-13 PROCEDURE — 99231 SBSQ HOSP IP/OBS SF/LOW 25: CPT | Mod: GC | Performed by: PHYSICAL MEDICINE & REHABILITATION

## 2024-11-13 PROCEDURE — 97129 THER IVNTJ 1ST 15 MIN: CPT | Mod: GN | Performed by: SPEECH-LANGUAGE PATHOLOGIST

## 2024-11-13 PROCEDURE — 97530 THERAPEUTIC ACTIVITIES: CPT | Mod: GP

## 2024-11-13 PROCEDURE — 97112 NEUROMUSCULAR REEDUCATION: CPT | Mod: GP

## 2024-11-13 RX ADMIN — ENOXAPARIN SODIUM 40 MG: 40 INJECTION SUBCUTANEOUS at 13:54

## 2024-11-13 RX ADMIN — METHOCARBAMOL 500 MG: 500 TABLET ORAL at 22:00

## 2024-11-13 RX ADMIN — METOPROLOL TARTRATE 50 MG: 50 TABLET, FILM COATED ORAL at 22:00

## 2024-11-13 RX ADMIN — ATORVASTATIN CALCIUM 40 MG: 40 TABLET, FILM COATED ORAL at 22:00

## 2024-11-13 RX ADMIN — BISACODYL 10 MG: 10 SUPPOSITORY RECTAL at 18:31

## 2024-11-13 RX ADMIN — CLOPIDOGREL BISULFATE 75 MG: 75 TABLET ORAL at 08:38

## 2024-11-13 RX ADMIN — LOSARTAN POTASSIUM 50 MG: 50 TABLET, FILM COATED ORAL at 08:38

## 2024-11-13 RX ADMIN — ASPIRIN 81 MG CHEWABLE TABLET 81 MG: 81 TABLET CHEWABLE at 08:38

## 2024-11-13 RX ADMIN — INSULIN GLARGINE 7 UNITS: 100 INJECTION, SOLUTION SUBCUTANEOUS at 08:34

## 2024-11-13 RX ADMIN — ACETAMINOPHEN, CAFFEINE 1 TABLET: 500; 65 TABLET, FILM COATED ORAL at 08:38

## 2024-11-13 RX ADMIN — PANTOPRAZOLE SODIUM 40 MG: 40 TABLET, DELAYED RELEASE ORAL at 06:17

## 2024-11-13 RX ADMIN — THIAMINE HCL TAB 100 MG 100 MG: 100 TAB at 08:38

## 2024-11-13 RX ADMIN — LOSARTAN POTASSIUM 50 MG: 50 TABLET, FILM COATED ORAL at 22:29

## 2024-11-13 RX ADMIN — ACETAMINOPHEN 650 MG: 325 TABLET, FILM COATED ORAL at 13:54

## 2024-11-13 RX ADMIN — METOPROLOL TARTRATE 50 MG: 50 TABLET, FILM COATED ORAL at 08:38

## 2024-11-13 RX ADMIN — POLYETHYLENE GLYCOL 3350 17 G: 17 POWDER, FOR SOLUTION ORAL at 22:01

## 2024-11-13 RX ADMIN — ACETAMINOPHEN 650 MG: 325 TABLET, FILM COATED ORAL at 08:38

## 2024-11-13 RX ADMIN — SENNOSIDES 2 TABLET: 8.6 TABLET, FILM COATED ORAL at 08:38

## 2024-11-13 RX ADMIN — LIDOCAINE 1 PATCH: 4 PATCH TOPICAL at 22:01

## 2024-11-13 RX ADMIN — AMLODIPINE BESYLATE 10 MG: 10 TABLET ORAL at 08:38

## 2024-11-13 RX ADMIN — Medication 5 MG: at 22:00

## 2024-11-13 RX ADMIN — INSULIN GLARGINE 7 UNITS: 100 INJECTION, SOLUTION SUBCUTANEOUS at 22:09

## 2024-11-13 NOTE — PLAN OF CARE
Goal Outcome Evaluation:      Plan of Care Reviewed With: patient    Overall Patient Progress: improvingOverall Patient Progress: improving       Vitals/BGs: Stable vitals and BGs. No correction novolog given. Just scheduled Lantus insulin 7 units in the morning.  Orientation: Pt alert and oriented x 4 but forgetful at times.   Pain: c/o right shoulder pain. Prn tylenol helpful. Scheduled Excedrin helpful with headache.   Mobility: Liko lift with 2 staff to transfer on and off the bed. Assist of 2 to reposition in bed.  Diet: Tolerating regular/thin diet with fair to good appetite.  Bladder: Mixed continence. Using the urinal but he still sometimes spills. Inc cares done. Bed linens, shorts, inc pad and T shirt changed due to inc. Primofit at night.  Bowel: Bowel program after dinner. LBM yesterday. Plan was to have dinner at 5pm and start bowel program at 5:30pm but dinner was delayed so will do bowel program after.  Skin/Lines: Scalp incision healing well, JUANITO.  Safety: Helmet when out of bed. Alarms on at all times.

## 2024-11-13 NOTE — PLAN OF CARE
Goal Outcome Evaluation:      Plan of Care Reviewed With: patient    Overall Patient Progress: improvingOverall Patient Progress: improving       Vitals/BGs: Stable vitals and BGs. No correction novolog needed. Just got scheduled Lantus insulin 7 units in the morning.  Orientation: Pt alert and oriented x 4 but forgetful at times.   Pain: Prn tylenol helpful for right shoulder pain. Scheduled Excedrin helpful with headache.   Mobility: Liko lift with 2 staff to transfer on and off the bed. Assist of 2 to reposition in bed.  Diet: Tolerating regular/thin diet with good appetite.  Bladder: Mixed continence. Using the urinal but sometimes incontinent or spills. Primofit at night.  Bowel: Bowel program after dinner. LBM yesterday. Skin/Lines: Scalp incision healing well, JUANITO.  Safety: Helmet when out of bed. Alarms on at all times.

## 2024-11-13 NOTE — PLAN OF CARE
Discharge Plan: home w/ assist. HHPT     Precautions: fall, crani, helmet OOB. Do not leave alone EOB/commode.     Current Status:  Bed Mobility: Mod-A L, Max-A R rolling and supine<>sit  Transfer: Liko Ax2  Gait: unable, unsafe  Stairs: unable, unsafe  Balance: Able to sit unsupported SBA to min-A. Dynamic posterior/L lean, pt works to correct and is aware of midline     Outcome Measures:   PASS               11/5: 0/36  Rice              11/5: 0/56     Assessment: Pt demonstrates progress in maintaining midline since admission, continues to benefit from visual feedback, as well as skilled facilitation. Pt continues to require heavy assist for functional mobility, maxAx1 for bed mobility, OH lift for transfers. Pt has trialed serasteady in therapy however requires skilled Ax1-2 required. Pt demonstrates emerging activation in L LE, and will benefit from ongoing therapy beyond ARU.     Other Barriers to Discharge (DME, Family Training, etc):   1 LUPE (threshold) + 15STI to access bedroom/bathroom. Option for main floor living in [facility].      DME: pending progress

## 2024-11-13 NOTE — PLAN OF CARE
Goal Outcome Evaluation:      Plan of Care Reviewed With: patient    Overall Patient Progress: improvingOverall Patient Progress: improving  Orientation: alert and oriented; able to make needs known but forgetful at times  O2 use:denies of SOB. On room air  Pain: right shoulder pain. PRN tylenol given. Scheduled medications given for pain. Lidocaine  patch on right shoulder  Diet: regular diet thin liquids  Bladder: incontinent of bladder. Primo fit at night.   Bowel: patient on bowel program. Up to purple chair after bisacodyl supp. Able to defecate large BM  Mixed continence in BM early AM  Ambulation/Transfer: A2 Liko with helmet when OOB  Blood sugar: BG at bedtime 104mg/dl. No corrective dose given   Skin:coccyx intact; left inner thigh with dry skin.   Safety: fall and craniotomy precaution

## 2024-11-13 NOTE — PROGRESS NOTES
[facility]   Acute Rehabilitation Unit  Daily Progress Note    INTERVAL HISTORY  Notes and labs reviewed over past 24 hours. No acute overnight events reported. Carl reports an episode of hypotension earlier today while transferring with the liko-lift. He denies experiencing any lightheadedness/dizziness/loss of consciousness during this episode. He otherwise reports that he is doing well today. Topical lidocaine in addition to bengay has been helpful in treatment of his right shoulder pain. He was able to have some more function in his LUE beginning last night. He has 3/5 strength in EF on exam today.       ROS: 10 point ROS was assessed and was negative unless otherwise stated in HPI    Functional  PT: 11/12  Bed Mobility: Mod-A L, Max-A R rolling and supine<>sit  Transfer: Liko Ax2  Gait: unable, unsafe  Stairs: unable, unsafe  Balance: Able to sit unsupported SBA to min-A. Dynamic posterior/L lean, pt works to correct and is aware of midline  Outcome Measures:   PASS 11/5: 0/36  Rice 11/5: 0/56     OT: 11/12  ADLs:  Mobility: A x 2 bed mobility. Lift transfer. Wheelchair based in Broda chair.   Grooming: Min A seated.  Dressing: UB A x 2 seated in chair. LB A x 2 supine.   Bathing: liko lift Ax2 to/from purple shower chair/commode. Assistance with washing/rinsing/drying 6/10 body parts.   Toileting: Liko lift Ax2 to/from purple dependent shower chair/commode. Dependent hygiene/clothing.  IADLs: PLOF IND. Recommend assist.  Vision/Cognition: Full time corrective lenses. L neglect, possible peripheral field deficits. Moderate cognitive deficits attention, recall, comprehension, problem solving.     SLP: 11/13  Hearing: WFL  Vision: glasses; significant visual impairment (baseline per patient) as well as left neglect/inattention  Communication: Mild dysarthria; speech intelligibility 80% in conversation  Cognition: Moderate cogntiive-communication impairment in areas of  attention, memory, executive function and visuospatial skills.   Swallow: Regular diet and thin liquids (0). Set up assist as needed, upright position/chair position is ideal, small bites/sips, slow rate, and alternate solids and liquids. ARU swallow eval only, pt independent with swallow strategies.    MEDICATIONS  Scheduled meds  Current Facility-Administered Medications   Medication Dose Route Frequency Provider Last Rate Last Admin    acetaminophen-caffeine (EXCEDRIN TENSION HEADACHE) 500-65 MG tablet 1 tablet  1 tablet Oral Daily Angel Vargas DO   1 tablet at 11/13/24 0838    amLODIPine (NORVASC) tablet 10 mg  10 mg Oral Daily Yessica Villalobos MD   10 mg at 11/13/24 0838    aspirin (ASA) chewable tablet 81 mg  81 mg Oral Daily Zac Hurley MD   81 mg at 11/13/24 0838    atorvastatin (LIPITOR) tablet 40 mg  40 mg Oral QPM Zac Hurley MD   40 mg at 11/12/24 1952    bisacodyl (DULCOLAX) suppository 10 mg  10 mg Rectal Daily Angel Vargas DO   10 mg at 11/12/24 1957    clopidogrel (PLAVIX) tablet 75 mg  75 mg Oral Daily Zac Hurley MD   75 mg at 11/13/24 0838    enoxaparin ANTICOAGULANT (LOVENOX) injection 40 mg  40 mg Subcutaneous Q24H Zac Hurley MD   40 mg at 11/13/24 1354    insulin aspart (NovoLOG) injection (RAPID ACTING)  1-3 Units Subcutaneous TID Zac Ruby MD        insulin aspart (NovoLOG) injection (RAPID ACTING)  1-3 Units Subcutaneous At Bedtime Zac Hurley MD        insulin glargine (LANTUS PEN) injection 7 Units  7 Units Subcutaneous QAM AC Zac Hurley MD   7 Units at 11/13/24 0834    insulin glargine (LANTUS PEN) injection 7 Units  7 Units Subcutaneous At Bedtime Zac Hurley MD   7 Units at 11/12/24 2139    Lidocaine (LIDOCARE) 4 % Patch 1 patch  1 patch Transdermal Q24h Angel Vargas DO   1 patch at 11/12/24 1952    losartan (COZAAR) tablet 50 mg  50 mg Oral BID Yessica Villalobos MD   50 mg at 11/13/24 0838    melatonin tablet 5 mg  5  "mg Oral At Bedtime Angel Vargas DO   5 mg at 11/12/24 2141    methocarbamol (ROBAXIN) tablet 500 mg  500 mg Oral At Bedtime Angel Vargas DO   500 mg at 11/12/24 1952    metoprolol tartrate (LOPRESSOR) tablet 50 mg  50 mg Oral BID Yessica Villalobos MD   50 mg at 11/13/24 0838    pantoprazole (PROTONIX) EC tablet 40 mg  40 mg Oral QAM AC Yessica Villalobos MD   40 mg at 11/13/24 0617    polyethylene glycol (MIRALAX) Packet 17 g  17 g Oral Daily Zac Hurley MD   17 g at 11/10/24 2045    sennosides (SENOKOT) tablet 2 tablet  2 tablet Oral Daily Zac Hurley MD   2 tablet at 11/13/24 0838    thiamine (B-1) tablet 100 mg  100 mg Oral Daily Zac Hurley MD   100 mg at 11/13/24 0838       PRN meds:  Current Facility-Administered Medications   Medication Dose Route Frequency Provider Last Rate Last Admin    acetaminophen (TYLENOL) tablet 650 mg  650 mg Oral Q4H PRN Zac Hurley MD   650 mg at 11/13/24 1354    glucose gel 15-30 g  15-30 g Oral Q15 Min PRN Zac Hurley MD        Or    dextrose 50 % injection 25-50 mL  25-50 mL Intravenous Q15 Min PRN Zca Hurley MD        Or    glucagon injection 1 mg  1 mg Subcutaneous Q15 Min PRN Zac Hurley MD        diclofenac (VOLTAREN) 1 % topical gel 4 g  4 g Topical 4x Daily PRN Zac Hurley MD   4 g at 11/12/24 1350    famotidine (PEPCID) tablet 20 mg  20 mg Oral BID PRN Zac Hurley MD        menthol (Topical Analgesic) 2.5% (BENGAY VANISHING SCENT) 2.5 % topical gel   Topical 4x Daily PRN Yessica Villalobos MD   Given at 11/10/24 2136    QUEtiapine (SEROquel) quarter-tab 6.25 mg  6.25 mg Oral At Bedtime PRN Zac Hurley MD   6.25 mg at 11/12/24 1958         PHYSICAL EXAM  BP (!) 144/86 (BP Location: Right arm)   Pulse 85   Temp 98.1  F (36.7  C) (Oral)   Resp 16   Ht 1.753 m (5' 9\")   Wt 102 kg (224 lb 13.9 oz)   SpO2 99%   BMI 33.21 kg/m    General: in no acute distress, conversational and alert, resting comfortably in " chair  HEENT: Craniotomy incision C/D/I. nares clear, conjunctiva white  Pulmonary: on room air, no acute distress. CTAB.   Cardiovascular: Warm, well perfused  Abdominal: soft, non-tender to palpation, non-distended  Extremities: warm peripherally, no edema in BLEs  Skin: warm, dry, without erythema, ecchymosis, or rash noted. Craniotomy site C/D/I.   Neuro: Conjugate gaze, left facial droop. 0/5 strength in left shoulder abduction. 3/5 strength in left EF. 0/5 strength in left elbow extension. 3/5 strength in finger flexion. Flaccid paralysis of the left lower extremity. No spasticity noted in LUE/LLE testing. 1-2 beats of left ankle clonus.      LABS  Results for orders placed or performed during the hospital encounter of 11/04/24 (from the past 24 hours)   Glucose by meter   Result Value Ref Range    GLUCOSE BY METER POCT 122 (H) 70 - 99 mg/dL   Glucose by meter   Result Value Ref Range    GLUCOSE BY METER POCT 104 (H) 70 - 99 mg/dL   Glucose by meter   Result Value Ref Range    GLUCOSE BY METER POCT 125 (H) 70 - 99 mg/dL       ASSESSMENT AND PLAN  Carl Sierra is a 49 year old left hand dominant male with past medical history of HTN and undiagnosed GLENN, who initially presented to October 2 with hypertensive emergency, left upper extremity weakness and numbness due to right MCA ischemic stroke. He underwent right craniectomy on October 5 for the management of cerebral edema and midline shift. Hospital course was complicated but he clinically improved and was transferred to ARU.     Updates Today:  - Right shoulder pain: Improved with topical lidocaine application.     Admission to acute inpatient rehab: 11/4/2024   Impairment group code: 01.1 Stroke      PT, OT and SLP 60 minutes of each on a daily basis, 6x a week, for 25 days,  in addition to rehab nursing and close management of physiatrist.     Impairment of ADL's:  OT for 60 min daily, 6x a week for 25 days, to work on upper and lower body self care,  dressing, toileting, bathing, energy conservation techniques with use of ADs as needed   Impairment of mobility:   PT for 60 min daily, 6x a week for 25 days, to work on strengthening, endurance buildup, transfers and most likely WC mobility.   Impairment of cognition/language/swallow:   SLP for 60 min daily, 6x a week  for cognitive evaluation and treatment strategies for higher level cognitive deficits and memory impairment   Rehab RN to administer medication, patient education on medication taking, VS monitoring, bowel regimen, glucose monitoring and wound care/surgical wound dressing changes and monitoring.      Medical Conditions  # Right MCA ischemic stroke s/p TNK (10/2/2024)  # Cerebral edema s/p decompressive craniectomy 10/5  Patient presented to [facility] on 10/2 with hypertensive emergency, left upper extremity numbness and weakness.  Stroke code was initiated on arrival and CTA head/neck revealed Short segment severe stenosis of the posterior branch of the M2 segment of the right MCA. Decision was made to give tenecteplase.  This did not significantly improve his symptoms.  Brain MRI additionally revealed small right posterior cerebellar stroke and multiple infarcts. Completed 7 day course of [facility]   Discharging team and neurosurgery  recommended low dose caffeine or modafinil to help with alertness if needed  - continue ASA and plavix  - consider modafinil.   - continue acetaminophen-caffeine (Excedrin Tension Headache) 500-65 mg tablet daily (was started on 10/25)    - stroke secondary prevention as below  - surgical wound healing well; staples were removed 10/21  - helmet when OOB   - follow up with neurosurgery for bone flap replacement - [facility]  (Would be direct admission day prior for lumbar drain placement and bone flap replacement the following day.)  - follow up stroke neurology and PM&R as outpatient       # Left Sided Hemiplegia, Flaccid  Patient reports  persistent left sided weakness as well as nighttime pain and spasms in his extremities. Examination significant for flaccid paralysis of left upper and lower extremity with complete loss of tone.    - Monitor for spasms  - Consider gabapentin      # HTN  Not managed prior to admission . Has had intermittent hypertension while at ARU.   - continue metoprolol 50 bid   - continue losartan 50 bid (was dosed bid for better control of morning elevation in BP and can be changed to daily dosing)  - amlodipine 10 daily      # HLD  LDL 98 on 10/3.   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   - continue prn O2  - formal sleep study as outpatient       # Hyponatremia, Resolving  Resolved per discharging team; it was 135 and slightly low on 11/4  - Will continue to monitor with qMon/Thurs BMP     # Normocytic Anemia   Mild and stable, likely due to chronic disease vs CHAD.   - Will continue to monitor with qMon/Thurs CBC    # Headaches  # Neck pain   # L knee pain   Headaches seem to be well controlled on tylenol. Typically starts towards the end of the day and also responded to lying flat and hydration.   -Was using lidocaine patch and bengay for neck pain as well as prn norco. Pain seems myofascial vs AC joint related in nature with no clinical s/s of radiculopathy. L knee pain for 20 years due to sports injury and likely early OA.   - Add  Robaxin 500 mg at nighttime, monitor for drowsiness   - continue tylenol 650 mg Q4H PRN   - was also on norco 0.5-1 tablet q4h prn since 10/29; was discontinued at discharge.   - reported no benefits from lidocaine so it was discontinued  - added topical bengay and diclofenac cream   - can consider TENS for the myofascial pain      # Insomnia   Per chart review, Seroquel made him agitated and was not beneficial so it was discontinued at discharge and per sign out from discharging team (last dose 11/2). However, on admission to ARU, patient states that Seroquel was beneficial.   - continue melatonin   - Seroquel 6.25 mg PO HS PRN   - consider ramelteon      # Obesity  BMI 33.86  - nutrition consult      # Depression  # Adjustment to disability  Worsening mood in the setting of his stroke and disability.   - rehab psych consult   - spiritual health consult   - can consider selective serotonin reuptake inhibitor vs SNRI      # Neurogenic bowel  - Miralax one packet scheduled daily   - Senna BID, starting tmrw AM   -  Bisacodyl 10mg suppository QD PRN     # Neurogenic bladder:   - ok to use primofit  - will start using urinal during the day and possibly toileting using the commode pending his course       FEN: regular with thin. Continued B1 supplement.   DVT Prophylaxis: DAPT, lovenox 40 mg QD  GI Prophylaxis: Added protonix upon admission.  Code: Full - confirmed upon admission   Disposition: he will WC based and will require assistance with all ADLs and mobility; home is not accessible. Will need as ramp and possibly chair lift for stairs.   ELOS/Discharge Date:  4 weeks and most likely TCU after that before discharge to home.  Rehab prognosis:  Good   Follow up Appointments on Discharge:   Outpatient PCP in 1-2 weeks.   Outpatient PM&R in 1-2 months.  Stroke neurology   Neurosurgery    Cardiology   Sleep medicine   Psychology     --   Zac Hurley MD  [facility] PM&R PGY-2  Pager: [phone]

## 2024-11-13 NOTE — PLAN OF CARE
Discharge Planner Post-Acute Rehab SLP:      Discharge Plan: TCU vs home with ongoing SLP     Precautions: fall, crani/helmet, impulsivity     Current Status:  Hearing: WFL  Vision: glasses; significant visual impairment (baseline per patient) as well as left neglect/inattention  Communication: Mild dysarthria; speech intelligibility 80% in conversation  Cognition: Moderate cogntiive-communication impairment in areas of attention, memory, executive function and visuospatial skills.   Swallow: Regular diet and thin liquids (0). Set up assist as needed, upright position/chair position is ideal, small bites/sips, slow rate, and alternate solids and liquids. ARU swallow eval only, pt independent with swallow strategies.     Assessment:   Instructed pt in left visual scanning task with 3-step direction, alternating visual attention component. SLP added red visual anchor line to left margin. Pt required 3x verbal cues to locate left side, scan right; 1x cue for error correction. Instructed pt in mental flexibility and sustained attention task with complex/shifting card sort, pt completed with no cues/assist/repetition of current target.        Other Barriers to Discharge (Family Training, etc): level of assist/supervision with iADLS?

## 2024-11-14 ENCOUNTER — APPOINTMENT (OUTPATIENT)
Dept: PHYSICAL THERAPY | Facility: CLINIC | Age: 49
DRG: 057 | End: 2024-11-14
Attending: PHYSICAL MEDICINE & REHABILITATION
Payer: COMMERCIAL

## 2024-11-14 ENCOUNTER — APPOINTMENT (OUTPATIENT)
Dept: SPEECH THERAPY | Facility: CLINIC | Age: 49
DRG: 057 | End: 2024-11-14
Attending: PHYSICAL MEDICINE & REHABILITATION
Payer: COMMERCIAL

## 2024-11-14 ENCOUNTER — APPOINTMENT (OUTPATIENT)
Dept: OCCUPATIONAL THERAPY | Facility: CLINIC | Age: 49
DRG: 057 | End: 2024-11-14
Attending: PHYSICAL MEDICINE & REHABILITATION
Payer: COMMERCIAL

## 2024-11-14 LAB
ANION GAP SERPL CALCULATED.3IONS-SCNC: 10 MMOL/L (ref 7–15)
BUN SERPL-MCNC: 7.8 MG/DL (ref 6–20)
CALCIUM SERPL-MCNC: 9.2 MG/DL (ref 8.8–10.4)
CHLORIDE SERPL-SCNC: 104 MMOL/L (ref 98–107)
CREAT SERPL-MCNC: 0.69 MG/DL (ref 0.67–1.17)
EGFRCR SERPLBLD CKD-EPI 2021: >90 ML/MIN/1.73M2
ERYTHROCYTE [DISTWIDTH] IN BLOOD BY AUTOMATED COUNT: 16.7 % (ref 10–15)
GLUCOSE BLDC GLUCOMTR-MCNC: 104 MG/DL (ref 70–99)
GLUCOSE BLDC GLUCOMTR-MCNC: 111 MG/DL (ref 70–99)
GLUCOSE BLDC GLUCOMTR-MCNC: 118 MG/DL (ref 70–99)
GLUCOSE BLDC GLUCOMTR-MCNC: 130 MG/DL (ref 70–99)
GLUCOSE SERPL-MCNC: 107 MG/DL (ref 70–99)
HCO3 SERPL-SCNC: 23 MMOL/L (ref 22–29)
HCT VFR BLD AUTO: 33.3 % (ref 40–53)
HGB BLD-MCNC: 10.7 G/DL (ref 13.3–17.7)
MCH RBC QN AUTO: 25.6 PG (ref 26.5–33)
MCHC RBC AUTO-ENTMCNC: 32.1 G/DL (ref 31.5–36.5)
MCV RBC AUTO: 80 FL (ref 78–100)
PLATELET # BLD AUTO: 207 10E3/UL (ref 150–450)
POTASSIUM SERPL-SCNC: 3.7 MMOL/L (ref 3.4–5.3)
RBC # BLD AUTO: 4.18 10E6/UL (ref 4.4–5.9)
SODIUM SERPL-SCNC: 137 MMOL/L (ref 135–145)
WBC # BLD AUTO: 4.8 10E3/UL (ref 4–11)

## 2024-11-14 PROCEDURE — 82565 ASSAY OF CREATININE: CPT

## 2024-11-14 PROCEDURE — 97112 NEUROMUSCULAR REEDUCATION: CPT | Mod: GP | Performed by: PHYSICAL THERAPIST

## 2024-11-14 PROCEDURE — 36415 COLL VENOUS BLD VENIPUNCTURE: CPT

## 2024-11-14 PROCEDURE — 250N000013 HC RX MED GY IP 250 OP 250 PS 637

## 2024-11-14 PROCEDURE — 250N000013 HC RX MED GY IP 250 OP 250 PS 637: Performed by: PHYSICAL MEDICINE & REHABILITATION

## 2024-11-14 PROCEDURE — 97130 THER IVNTJ EA ADDL 15 MIN: CPT | Mod: GN

## 2024-11-14 PROCEDURE — 97130 THER IVNTJ EA ADDL 15 MIN: CPT | Mod: GN | Performed by: SPEECH-LANGUAGE PATHOLOGIST

## 2024-11-14 PROCEDURE — 85018 HEMOGLOBIN: CPT

## 2024-11-14 PROCEDURE — 97112 NEUROMUSCULAR REEDUCATION: CPT | Mod: GO | Performed by: OCCUPATIONAL THERAPIST

## 2024-11-14 PROCEDURE — 97129 THER IVNTJ 1ST 15 MIN: CPT | Mod: GN

## 2024-11-14 PROCEDURE — 97129 THER IVNTJ 1ST 15 MIN: CPT | Mod: GN | Performed by: SPEECH-LANGUAGE PATHOLOGIST

## 2024-11-14 PROCEDURE — 128N000003 HC R&B REHAB

## 2024-11-14 PROCEDURE — 80048 BASIC METABOLIC PNL TOTAL CA: CPT

## 2024-11-14 PROCEDURE — 250N000011 HC RX IP 250 OP 636

## 2024-11-14 RX ORDER — BACLOFEN 10 MG/1
10 TABLET ORAL 2 TIMES DAILY
Status: DISCONTINUED | OUTPATIENT
Start: 2024-11-14 | End: 2024-11-20

## 2024-11-14 RX ADMIN — METOPROLOL TARTRATE 50 MG: 50 TABLET, FILM COATED ORAL at 20:23

## 2024-11-14 RX ADMIN — ACETAMINOPHEN, CAFFEINE 1 TABLET: 500; 65 TABLET, FILM COATED ORAL at 07:55

## 2024-11-14 RX ADMIN — ATORVASTATIN CALCIUM 40 MG: 40 TABLET, FILM COATED ORAL at 20:23

## 2024-11-14 RX ADMIN — LOSARTAN POTASSIUM 50 MG: 50 TABLET, FILM COATED ORAL at 20:23

## 2024-11-14 RX ADMIN — ENOXAPARIN SODIUM 40 MG: 40 INJECTION SUBCUTANEOUS at 13:07

## 2024-11-14 RX ADMIN — CLOPIDOGREL BISULFATE 75 MG: 75 TABLET ORAL at 07:55

## 2024-11-14 RX ADMIN — AMLODIPINE BESYLATE 10 MG: 10 TABLET ORAL at 07:55

## 2024-11-14 RX ADMIN — LOSARTAN POTASSIUM 50 MG: 50 TABLET, FILM COATED ORAL at 07:55

## 2024-11-14 RX ADMIN — INSULIN GLARGINE 7 UNITS: 100 INJECTION, SOLUTION SUBCUTANEOUS at 20:33

## 2024-11-14 RX ADMIN — INSULIN GLARGINE 7 UNITS: 100 INJECTION, SOLUTION SUBCUTANEOUS at 08:00

## 2024-11-14 RX ADMIN — SENNOSIDES 2 TABLET: 8.6 TABLET, FILM COATED ORAL at 07:55

## 2024-11-14 RX ADMIN — BACLOFEN 10 MG: 10 TABLET ORAL at 20:23

## 2024-11-14 RX ADMIN — THIAMINE HCL TAB 100 MG 100 MG: 100 TAB at 07:55

## 2024-11-14 RX ADMIN — Medication 5 MG: at 20:23

## 2024-11-14 RX ADMIN — Medication 6.25 MG: at 20:30

## 2024-11-14 RX ADMIN — PANTOPRAZOLE SODIUM 40 MG: 40 TABLET, DELAYED RELEASE ORAL at 06:06

## 2024-11-14 RX ADMIN — METOPROLOL TARTRATE 50 MG: 50 TABLET, FILM COATED ORAL at 07:55

## 2024-11-14 RX ADMIN — BISACODYL 10 MG: 10 SUPPOSITORY RECTAL at 18:48

## 2024-11-14 RX ADMIN — ASPIRIN 81 MG CHEWABLE TABLET 81 MG: 81 TABLET CHEWABLE at 07:55

## 2024-11-14 RX ADMIN — ACETAMINOPHEN 650 MG: 325 TABLET, FILM COATED ORAL at 20:30

## 2024-11-14 NOTE — PLAN OF CARE
Skilled set up on :  Pt dependent for proper placement of electrodes on left quads, anterior tibialis, and gastroc for optimum muscle contraction, safe positioning on w/c within frame and cushion for prevention of skin breakdown, feet secured to foot pedals, w/c secured to  w/ Q-straints.  Passive motion assessed to ensure proper positioning.      Pt performed 35 minutes of active FES ergometry with 100% stimulation applied to above muscles at 35 rpm with 1.72 Nm resistance.  This PT adjusted e-stim and cycling parameters in real-time to ensure palpable muscle contractions throughout session.      Changes in parameters that were part of this treatment:   -None    Functional outcomes from this intervention include:   -reduced spasticity  -improved sensory awareness and proprioception  -improved muscle strength  -improved motor coordination    Session Summary:   -Average Power: 0.9 W  -Asymmetry: 0%  -Active Minutes: 04:47

## 2024-11-14 NOTE — PLAN OF CARE
Discharge Planner Post-Acute Rehab SLP:      Discharge Plan: TCU vs home with ongoing SLP     Precautions: fall, crani/helmet, impulsivity     Current Status:  Hearing: WFL  Vision: glasses; significant visual impairment (baseline per patient) as well as left neglect/inattention  Communication: Mild dysarthria; speech intelligibility 80% in conversation  Cognition: Moderate cogntiive-communication impairment in areas of attention, memory, executive function and visuospatial skills.   Swallow: Regular diet and thin liquids (0). Set up assist as needed, upright position/chair position is ideal, small bites/sips, slow rate, and alternate solids and liquids. ARU swallow eval only, pt independent with swallow strategies.     Assessment:   Instructed pt in moderate level level working memory task with abstract reasoning component. Pt identified target with occasional repetition of stim, generate additional target in 100% opportunities.        Other Barriers to Discharge (Family Training, etc): level of assist/supervision with iADLS?

## 2024-11-14 NOTE — CARE CONFERENCE
Acute Rehab Care Conference/Team Rounds      Type: Team Rounds    Present: Dr. Vargas, Resident Zac Hurley, Dr. Elias Neuropsychologist, Elizabeth Gerard PT, Angela Moran OT, Cassie Benitez SLP, Anabella Malagon PCS, Kay Caldwell , Wendy Sims RD, Kenny George RN, and Carl Sierra Patient.    Discharge Barriers/Treatment/Education    Rehab Diagnosis: post CVA with L hemiparesis     Active Medical Co-morbidities/Prognosis:     Patient Active Problem List   Diagnosis    Acute ischemic right MCA stroke (H)         Safety: A/O x 4, forgetful at times, able to make needs known, uses the call light appropriately    Pain: denies    Medications, Skin, Tubes/Lines: Takes medications whole one at a time with water, scalp incision JUANITO, no tubes nor lines    Swallowing/Nutrition: Regular solids and thin liquids (0). Pt independent with trained compensatory strategies to increase safety with oral intake, ARU SLP eval only.    Bowel/Bladder: Mixed incontinent of BL, cont of BM-11/13/24, on bowel program    Psychosocial: Lives with significant other, Renea. Grew up in [place], mother still lives there but is able to fly in. Significant other's family supportive. Works full-time as an  at the [employer].     ADLs/IADLs: Pt is making slow but steady progress with ADLs. Pt is limited by L hemiparesis, L lean, impaired cognition, and L neglect. Pt requires liko lift Ax2 to/from purple shower chair/commode for toileting, and total A with toilet hygiene supine. Pt requires Ax2 with LBD tasks supine. Pt requires max/total A with UBD tasks seated. Pt requires min A with G/H tasks seated. Utilizing FES and Xcite for LUE neuro re-education and sensorimotor skills. Anticipate pt will need prolonged rehab stay following ARU.     Mobility:   Bed Mobility: Mod-A L, Max-A R rolling and supine<>sit  Transfer: Liko Ax2  Gait: unable, unsafe  Stairs: unable, unsafe  Balance: Able to sit  unsupported SBA to min-A. Dynamic posterior/L lean, pt works to correct and is aware of midline     PASS               11/5: 0/36  Rice              11/5: 0/56   Pt demonstrates progress in maintaining midline since admission, continues to benefit from visual feedback, as well as skilled facilitation. Pt continues to require heavy assist for functional mobility, maxAx1 for bed mobility, OH lift for transfers. Pt has trialed serasteady in therapy however requires skilled Ax1-2 required. Pt demonstrates emerging activation in L LE, and will benefit from ongoing therapy beyond ARU.    Cognition/Language: Min to mild dysarthria at conversation level, does not impact communication of wants, needs, etc. Pt with moderate cognitive impairments in areas of attention, memory, executive function and visuospatial skills deficits related to left-side neglect. Pt continues to require require cues to attend to left side of  visual stim consistently. Ongoing SLP for cognition recommended.    Community Re-Entry: reach a level of mod I     Transportation: will need assistance     Decision maker: self    Plan of Care and goals reviewed and updated.    Discharge Plan/Recommendations    Fall Precautions: continue    Patient/Family input to goals: yes     Estimated length of stay: 18 days     Overall plan for the patient: reach a level of mod I       Utilization Review and Continued Stay Justification    Medical Necessity Criteria:    For any criteria that is not met, please document reason and plan for discharge, transfer, or modification of plan of care to address.    Requires intensive rehabilitation program to treat functional deficits?: Yes    Requires 3x per week or greater involvement of rehabilitation physician to oversee rehabilitation program?: Yes    Requires rehabilitation nursing interventions?: Yes    Patient is making functional progress?: Yes    There is a potential for additional functional progress? Yes    Patient is  participating in therapy 3 hours per day a minimum of 5 days per week or 15 hours per week in 7 day period?:Yes    Has discharge needs that require coordinated discharge planning approach?:Yes      Barriers/Concerns related to meeting medical necessity criteria:  none    Team Plan to Address Concern:  As needed      Final Physician Sign off    Statement of Approval:   I agree with all the recommendations detailed in this document.      Angel Vargas, DO      Patient Goals  Social Work Goals: Confirm discharge recommendations with therapy, coordinate safe discharge plan and remain available to support and assist as needed.    OT Predicted Duration/Target Date for Goal Attainment: 12/03/24  Therapy Frequency (OT): 6 times/week  OT: Hygiene/Grooming: independent  OT: Upper Body Dressing: Minimal assist  OT: Lower Body Dressing: Minimal assist  OT: Upper Body Bathing: Minimal assist  OT: Lower Body Bathing: Minimal assist  OT: Bed Mobility: Minimal assist  OT: Transfer: Minimal assist  OT: Toilet Transfer/Toileting: Moderate assist  OT: Cognitive: Patient/caregiver will verbalize understanding of cognitive assessment results/recommendations as needed for safe discharge planning  OT: Goal 1: Patient will demo Min A functional transfer to shower using DME/AE prn       PT Predicted Duration/Target Date for Goal Attainment: 12/02/24  PT Frequency: 6x/week  PT: Bed Mobility: Minimal assist  PT: Transfers: Minimal assist  PT: Wheelchair Mobility: 150 feet, manual wheelchair  PT: Goal 1: Pt and family will independently verbalize 3 fall prevention strategies  PT: Goal 2: Pt's family will complete caregiver training and demonstrate 100% consistency w/ safe assist  for transfers, bed mobility, w/c management and positioning w/ pt prior to discharge.      SLP Predicted Duration/Target Date for Goal Attainment: 12/18/24  Therapy Frequency (SLP Eval): 6 times/week  SLP: Goal 1: Patient will attend to left side within tasks  given strategies and mod cues.  SLP: Goal 2: Patient will recall important information with use of external aids and strategies given min cues.  SLP: Goal 3: Patient will complete easy to moderate level executive function and problem solving/reasoning tasks with 80% accuracy given min cues.     Nursing Goals  Bowel and Bladder care  Fall prevention   Medication Education  Skin Care protection

## 2024-11-14 NOTE — PLAN OF CARE
"Goal Outcome Evaluation:      Plan of Care Reviewed With: patient, parent    A&O x3 - forgetful. Ax2 w/liko. Mixed incontinence, primofit at night - LBM 11/14. Denies chest pains & SOB. On RA. Diet reg,thin, whole. Surgical incision on head JUANITO. Helmet on OOB. 1 bisacodyle suppository done this shift. Precaution for LUE shoulder. Family at bedside. VSS, no acute changes this shift. Pt able to make needs known. Bed low, locked, and call light within reach. Will continue POC.     Additional info: Family at bedside    /81 (BP Location: Right arm)   Pulse 69   Temp 97.5  F (36.4  C) (Oral)   Resp 16   Ht 1.753 m (5' 9\")   Wt 102 kg (224 lb 13.9 oz)   SpO2 100%   BMI 33.21 kg/m          "

## 2024-11-14 NOTE — PROGRESS NOTES
Discharge Planner Post-Acute Rehab OT:      Discharge Plan: TCU     Precautions: Falls, Craniectomy - helmet OOB, LUE/LLE hemiparesis, L neglect.  *Safe sitting plan     Current Status:  ADLs:  Mobility: A x 2 bed mobility. Lift transfer. Wheelchair based in Broda chair.   Grooming: Min A seated.  Dressing: UB A x 2 seated in chair. LB A x 2 supine.   Bathing: liko lift Ax2 to/from purple shower chair/commode. Assistance with washing/rinsing/drying 6/10 body parts.   Toileting: Liko lift Ax2 to/from purple dependent shower chair/commode. Dependent hygiene/clothing.  IADLs: PLOF IND. Recommend assist.  Vision/Cognition: Full time corrective lenses. L neglect, possible peripheral field deficits. Moderate cognitive deficits attention, recall, comprehension, problem solving.      Assessment: Utilized FES for LUE Neuro re-education and sensorimotor skills; refer to care plan note for further details.      Other Barriers to Discharge (DME, Family Training, etc):   Home set up: 1 LUPE. 16 stairs to access bedroom/bathroom. Can stay on main level with bedroom/bathroom but no shower.   Caregiver support: Partner working remotely, involved and present on unit.   Equipment needs pending progress.

## 2024-11-14 NOTE — PLAN OF CARE
Goal Outcome Evaluation:    Plan of Care Reviewed With: patient    Overall Patient Progress: improving    Outcome Evaluation: Pt continues to use the call light appropriately and waits for assistance from staff. Denies pain when asked. He is continent of BM and mixed incontinent of BL.     Slept most of the night.

## 2024-11-14 NOTE — PROGRESS NOTES
Skilled set up on :  Pt dependent for proper placement of electrodes on LUE for optimum muscle contraction, safe positioning on w/c within frame and cushion for prevention of skin breakdown, feet secured to foot pedals, w/c secured to  w/ Q-straints.  Passive motion assessed to ensure proper positioning.       Pt performed 34 minutes of active FES ergometry with 0-100% stimulation applied to above muscles at .50 rpm with 35 Nm resistance. This OT adjusted e-stim and cycling parameters in real-time to ensure palpable muscle contractions throughout session.       Changes in parameters that were part of this treatment:   -resistance     Functional outcomes from this intervention include:   -reduced spasticity  -improved sensory awareness and proprioception  -improved muscle strength  -improved motor coordination

## 2024-11-14 NOTE — PROGRESS NOTES
XCite stim unit for Activity Based Therapy. Skilled set up; determined appropriate FES parameters for each muscle group based off of strong tetanic response of muscle test.  Used the following activities from the software library: General  Intervention and patient response:Pt completed 35 repetitions x3 sets of postural control with writer guiding through transitional movements

## 2024-11-14 NOTE — PLAN OF CARE
Goal Outcome Evaluation:      Plan of Care Reviewed With: patient    Overall Patient Progress: improvingOverall Patient Progress: improving    Alert and oriented 3, denies headache or dizziness, denies chest pain or sob. Helmet on when oob, incontinent of bladder x 1, use call light appropriately. Patient MOM and SO at bedside most of the shift, will continue Poc

## 2024-11-14 NOTE — PLAN OF CARE
"Goal Outcome Evaluation:      Plan of Care Reviewed With: patient    Overall Patient Progress: improving  VS: /72 (BP Location: Right arm, Patient Position: Semi-Vargas's, Cuff Size: Adult Large)   Pulse 82   Temp 98.1  F (36.7  C) (Oral)   Resp 16   Ht 1.753 m (5' 9\")   Wt 102 kg (224 lb 13.9 oz)   SpO2 100%   BMI 33.21 kg/m       O2: SpO2 100% and stable on RA. Denies chest pain and SOB.    Output: Voids using beside urinal / BSC.   Last BM: 11/13, denies abdominal discomfort. BS active / passing flatus.    Activity: Up with A x 2 liko lift   Skin: WDL except, scalp Incision (JUANITO)   Pain: Pain managed with prn Tylenol.   Orientation: Alert and Oriented but forgetful .   Dressing: none   Diet: Regular diet. Denies nausea/vomiting.   BG checks before meals and at bedtime. BG check at bedtime 124.   LDA:  No PIV access.    Equipment: IV pole, personal belongings,    Plan: Continue with plan of care. Call light within reach, pt able to make needs known.    Additional Info: Pt was bleeding from a tiny spot on the abdomen after Lovenox injection today, writer applied pressure  and bleeding stopped. Pt's mother requested if it was possible to check pt's  blood clotting time, paged the Dr on call ; ordered stat lab orders, results are out  and on call provider was notified.                "

## 2024-11-14 NOTE — PROGRESS NOTES
Discharge Planner Post-Acute Rehab OT:      Discharge Plan: TCU     Precautions: Falls, Craniectomy - helmet OOB, LUE/LLE hemiparesis, L neglect.  *Safe sitting plan     Current Status:  ADLs:  Mobility: A x 2 bed mobility. Lift transfer. Wheelchair based in Broda chair.   Grooming: Min A seated.  Dressing: UB A x 2 seated in chair. LB A x 2 supine.   Bathing: liko lift Ax2 to/from purple shower chair/commode. Assistance with washing/rinsing/drying 6/10 body parts.   Toileting: Liko lift Ax2 to/from purple dependent shower chair/commode. Dependent hygiene/clothing.  IADLs: PLOF IND. Recommend assist.  Vision/Cognition: Full time corrective lenses. L neglect, possible peripheral field deficits. Moderate cognitive deficits attention, recall, comprehension, problem solving.      Assessment: Utilized Xcite for postural control, refer to care plan note for further details.      Other Barriers to Discharge (DME, Family Training, etc):   Home set up: 1 LUPE. 16 stairs to access bedroom/bathroom. Can stay on main level with bedroom/bathroom but no shower.   Caregiver support: Partner working remotely, involved and present on unit.   Equipment needs pending progress.

## 2024-11-14 NOTE — PROGRESS NOTES
Team rounds today. TCU placement recommended, pt would be appropriate for [facility]. SW faxed a referral to [facility].    Pending TCU referrals:  [facility]   ARU/TCU/LTACH    Phone: [phone]  Fax: [phone]

## 2024-11-15 ENCOUNTER — APPOINTMENT (OUTPATIENT)
Dept: PHYSICAL THERAPY | Facility: CLINIC | Age: 49
DRG: 057 | End: 2024-11-15
Attending: PHYSICAL MEDICINE & REHABILITATION
Payer: COMMERCIAL

## 2024-11-15 ENCOUNTER — APPOINTMENT (OUTPATIENT)
Dept: OCCUPATIONAL THERAPY | Facility: CLINIC | Age: 49
DRG: 057 | End: 2024-11-15
Attending: PHYSICAL MEDICINE & REHABILITATION
Payer: COMMERCIAL

## 2024-11-15 ENCOUNTER — APPOINTMENT (OUTPATIENT)
Dept: SPEECH THERAPY | Facility: CLINIC | Age: 49
DRG: 057 | End: 2024-11-15
Attending: PHYSICAL MEDICINE & REHABILITATION
Payer: COMMERCIAL

## 2024-11-15 LAB
GLUCOSE BLDC GLUCOMTR-MCNC: 100 MG/DL (ref 70–99)
GLUCOSE BLDC GLUCOMTR-MCNC: 104 MG/DL (ref 70–99)
GLUCOSE BLDC GLUCOMTR-MCNC: 112 MG/DL (ref 70–99)
GLUCOSE BLDC GLUCOMTR-MCNC: 122 MG/DL (ref 70–99)

## 2024-11-15 PROCEDURE — 97535 SELF CARE MNGMENT TRAINING: CPT | Mod: GO | Performed by: STUDENT IN AN ORGANIZED HEALTH CARE EDUCATION/TRAINING PROGRAM

## 2024-11-15 PROCEDURE — 97130 THER IVNTJ EA ADDL 15 MIN: CPT | Mod: GN | Performed by: SPEECH-LANGUAGE PATHOLOGIST

## 2024-11-15 PROCEDURE — 250N000013 HC RX MED GY IP 250 OP 250 PS 637

## 2024-11-15 PROCEDURE — 250N000011 HC RX IP 250 OP 636

## 2024-11-15 PROCEDURE — 97530 THERAPEUTIC ACTIVITIES: CPT | Mod: GP

## 2024-11-15 PROCEDURE — 97112 NEUROMUSCULAR REEDUCATION: CPT | Mod: GO | Performed by: STUDENT IN AN ORGANIZED HEALTH CARE EDUCATION/TRAINING PROGRAM

## 2024-11-15 PROCEDURE — 250N000013 HC RX MED GY IP 250 OP 250 PS 637: Performed by: PHYSICAL MEDICINE & REHABILITATION

## 2024-11-15 PROCEDURE — 99232 SBSQ HOSP IP/OBS MODERATE 35: CPT | Mod: GC | Performed by: PHYSICAL MEDICINE & REHABILITATION

## 2024-11-15 PROCEDURE — 97129 THER IVNTJ 1ST 15 MIN: CPT | Mod: GN | Performed by: SPEECH-LANGUAGE PATHOLOGIST

## 2024-11-15 PROCEDURE — 97112 NEUROMUSCULAR REEDUCATION: CPT | Mod: GP

## 2024-11-15 PROCEDURE — 128N000003 HC R&B REHAB

## 2024-11-15 RX ORDER — SENNOSIDES 8.6 MG
1 TABLET ORAL DAILY
Status: DISCONTINUED | OUTPATIENT
Start: 2024-11-16 | End: 2024-11-21

## 2024-11-15 RX ORDER — POLYETHYLENE GLYCOL 3350 17 G/17G
17 POWDER, FOR SOLUTION ORAL DAILY PRN
Status: DISCONTINUED | OUTPATIENT
Start: 2024-11-15 | End: 2024-11-22 | Stop reason: HOSPADM

## 2024-11-15 RX ADMIN — METOPROLOL TARTRATE 50 MG: 50 TABLET, FILM COATED ORAL at 20:02

## 2024-11-15 RX ADMIN — BACLOFEN 10 MG: 10 TABLET ORAL at 21:18

## 2024-11-15 RX ADMIN — INSULIN GLARGINE 7 UNITS: 100 INJECTION, SOLUTION SUBCUTANEOUS at 08:55

## 2024-11-15 RX ADMIN — BACLOFEN 10 MG: 10 TABLET ORAL at 08:48

## 2024-11-15 RX ADMIN — CLOPIDOGREL BISULFATE 75 MG: 75 TABLET ORAL at 08:48

## 2024-11-15 RX ADMIN — ACETAMINOPHEN 650 MG: 325 TABLET, FILM COATED ORAL at 05:16

## 2024-11-15 RX ADMIN — ATORVASTATIN CALCIUM 40 MG: 40 TABLET, FILM COATED ORAL at 20:02

## 2024-11-15 RX ADMIN — ACETAMINOPHEN, CAFFEINE 1 TABLET: 500; 65 TABLET, FILM COATED ORAL at 08:48

## 2024-11-15 RX ADMIN — ENOXAPARIN SODIUM 40 MG: 40 INJECTION SUBCUTANEOUS at 14:49

## 2024-11-15 RX ADMIN — Medication 5 MG: at 20:02

## 2024-11-15 RX ADMIN — ASPIRIN 81 MG CHEWABLE TABLET 81 MG: 81 TABLET CHEWABLE at 08:48

## 2024-11-15 RX ADMIN — Medication 6.25 MG: at 21:18

## 2024-11-15 RX ADMIN — INSULIN GLARGINE 7 UNITS: 100 INJECTION, SOLUTION SUBCUTANEOUS at 20:01

## 2024-11-15 RX ADMIN — LOSARTAN POTASSIUM 50 MG: 50 TABLET, FILM COATED ORAL at 20:02

## 2024-11-15 RX ADMIN — BISACODYL 10 MG: 10 SUPPOSITORY RECTAL at 18:14

## 2024-11-15 RX ADMIN — PANTOPRAZOLE SODIUM 40 MG: 40 TABLET, DELAYED RELEASE ORAL at 05:16

## 2024-11-15 RX ADMIN — ACETAMINOPHEN 650 MG: 325 TABLET, FILM COATED ORAL at 18:14

## 2024-11-15 RX ADMIN — SENNOSIDES 2 TABLET: 8.6 TABLET, FILM COATED ORAL at 08:48

## 2024-11-15 RX ADMIN — LIDOCAINE 1 PATCH: 4 PATCH TOPICAL at 20:02

## 2024-11-15 RX ADMIN — THIAMINE HCL TAB 100 MG 100 MG: 100 TAB at 08:48

## 2024-11-15 NOTE — PROGRESS NOTES
SW met with pt, pt's significant other, and pt's mother to discuss TCU. SW discussed [facility] referral, timeline, and what to expect. SW provided lists of in-network TCUs.    [facility]  [facility]   ARU/TCU/[facility]    Phone: [phone]  Fax: [phone]

## 2024-11-15 NOTE — PLAN OF CARE
Skilled set up on :  Pt dependent for proper placement of electrodes on LUE for optimum muscle contraction, safe positioning on w/c within frame and cushion for prevention of skin breakdown, feet secured to foot pedals, w/c secured to  w/ Q-straints.  Passive motion assessed to ensure proper positioning.      Pt performed 32 minutes of active FES ergometry with 100% stimulation applied to above muscles at 30 rpm with .5 Nm resistance.  This OT adjusted e-stim and cycling parameters in real-time to ensure palpable muscle contractions throughout session.      Changes in parameters that were part of this treatment:   -resistance  -pulse width, frequency  -amplitude  -pedal speed      Functional outcomes from this intervention include:   -reduced spasticity  -improved sensory awareness and proprioception  -improved muscle strength  -improved motor coordination

## 2024-11-15 NOTE — PLAN OF CARE
Goal Outcome Evaluation:      Plan of Care Reviewed With: patient    Overall Patient Progress: no changeOverall Patient Progress: no change     Orientation: alert and oriented; forgetful at times  O2 use: on room air. No reports of SOB  Pain:denies of pain  Diet:regular diet thin liquids  Bladder: incontinent. Primo fit on   Bowel: incontinent of BM  . Last BM 4/15/24  Ambulation/Transfer: Liko A2 with helmet OOB  Safety: fall and craniotomy precaution

## 2024-11-15 NOTE — PROGRESS NOTES
[facility]   Acute Rehabilitation Unit  Daily Progress Note    INTERVAL HISTORY  Notes and labs reviewed over past 24 hours. No acute overnight events reported. Carl reports that his right shoulder pain continues to be bothersome, but he does feel that he experiences some relief at night resulting from his transition from at bedtime robaxin to BID baclofen. We additionally discussed his bowel routine and recent mixed continence despite qPM bisacodyl suppository. We will plan to change miralax from scheduled to PRN and decrease strength of senna. He denies any other concerns today.     ROS: 10 point ROS was assessed and was negative unless otherwise stated in HPI    Functional  PT: 11/13  Bed Mobility: Mod-A L, Max-A R rolling and supine<>sit  Transfer: Liko Ax2  Gait: unable, unsafe  Stairs: unable, unsafe  Balance: Able to sit unsupported SBA to min-A. Dynamic posterior/L lean, pt works to correct and is aware of midline  Outcome Measures:   PASS  11/5: 0/36  Rice 11/5: 0/56     OT: 11/14  ADLs:  Mobility: A x 2 bed mobility. Lift transfer. Wheelchair based in Broda chair.   Grooming: Min A seated.  Dressing: UB A x 2 seated in chair. LB A x 2 supine.   Bathing: liko lift Ax2 to/from purple shower chair/commode. Assistance with washing/rinsing/drying 6/10 body parts.   Toileting: Liko lift Ax2 to/from purple dependent shower chair/commode. Dependent hygiene/clothing.  IADLs: PLOF IND. Recommend assist.  Vision/Cognition: Full time corrective lenses. L neglect, possible peripheral field deficits. Moderate cognitive deficits attention, recall, comprehension, problem solving.     SLP: 11/14  Hearing: WFL  Vision: glasses; significant visual impairment (baseline per patient) as well as left neglect/inattention  Communication: Mild dysarthria; speech intelligibility 80% in conversation  Cognition: Moderate cogntiive-communication impairment in areas of attention, memory, executive  function and visuospatial skills.   Swallow: Regular diet and thin liquids (0). Set up assist as needed, upright position/chair position is ideal, small bites/sips, slow rate, and alternate solids and liquids. ARU swallow eval only, pt independent with swallow strategies.    MEDICATIONS  Scheduled meds  Current Facility-Administered Medications   Medication Dose Route Frequency Provider Last Rate Last Admin    acetaminophen-caffeine (EXCEDRIN TENSION HEADACHE) 500-65 MG tablet 1 tablet  1 tablet Oral Daily Angel Vargas DO   1 tablet at 11/14/24 0755    amLODIPine (NORVASC) tablet 10 mg  10 mg Oral Daily Yessica Villalobos MD   10 mg at 11/14/24 0755    aspirin (ASA) chewable tablet 81 mg  81 mg Oral Daily Zac Hurley MD   81 mg at 11/14/24 0755    atorvastatin (LIPITOR) tablet 40 mg  40 mg Oral QPM Zac Hurley MD   40 mg at 11/14/24 2023    baclofen (LIORESAL) tablet 10 mg  10 mg Oral BID Zac Hurley MD   10 mg at 11/14/24 2023    bisacodyl (DULCOLAX) suppository 10 mg  10 mg Rectal Daily Angel Vargas DO   10 mg at 11/14/24 1848    clopidogrel (PLAVIX) tablet 75 mg  75 mg Oral Daily Zac Hurley MD   75 mg at 11/14/24 0755    enoxaparin ANTICOAGULANT (LOVENOX) injection 40 mg  40 mg Subcutaneous Q24H Zac Hurley MD   40 mg at 11/14/24 1307    insulin aspart (NovoLOG) injection (RAPID ACTING)  1-3 Units Subcutaneous TID Zac Ruby MD        insulin aspart (NovoLOG) injection (RAPID ACTING)  1-3 Units Subcutaneous At Bedtime Zac Hurley MD        insulin glargine (LANTUS PEN) injection 7 Units  7 Units Subcutaneous QAM Zac Ruby MD   7 Units at 11/14/24 0800    insulin glargine (LANTUS PEN) injection 7 Units  7 Units Subcutaneous At Bedtime Zac Hurley MD   7 Units at 11/14/24 2033    Lidocaine (LIDOCARE) 4 % Patch 1 patch  1 patch Transdermal Q24h Angel Vargas DO   1 patch at 11/13/24 2201    losartan (COZAAR) tablet 50 mg  50 mg Oral BID  "Yessica Villalobos MD   50 mg at 11/14/24 2023    melatonin tablet 5 mg  5 mg Oral At Bedtime Angel VargasDO   5 mg at 11/14/24 2023    metoprolol tartrate (LOPRESSOR) tablet 50 mg  50 mg Oral BID Yessica Villalobos MD   50 mg at 11/14/24 2023    pantoprazole (PROTONIX) EC tablet 40 mg  40 mg Oral QAM AC Yessica Villalobos MD   40 mg at 11/15/24 0516    polyethylene glycol (MIRALAX) Packet 17 g  17 g Oral Daily Zac Hurley MD   17 g at 11/13/24 2201    sennosides (SENOKOT) tablet 2 tablet  2 tablet Oral Daily Zac Hurley MD   2 tablet at 11/14/24 0755    thiamine (B-1) tablet 100 mg  100 mg Oral Daily Zac Hurley MD   100 mg at 11/14/24 0755       PRN meds:  Current Facility-Administered Medications   Medication Dose Route Frequency Provider Last Rate Last Admin    acetaminophen (TYLENOL) tablet 650 mg  650 mg Oral Q4H PRN Zac Hurley MD   650 mg at 11/15/24 0516    glucose gel 15-30 g  15-30 g Oral Q15 Min PRN Zac Hurley MD        Or    dextrose 50 % injection 25-50 mL  25-50 mL Intravenous Q15 Min PRN Zac Hurley MD        Or    glucagon injection 1 mg  1 mg Subcutaneous Q15 Min PRN Zac Hurley MD        diclofenac (VOLTAREN) 1 % topical gel 4 g  4 g Topical 4x Daily PRN Zac Hurley MD   4 g at 11/12/24 1350    famotidine (PEPCID) tablet 20 mg  20 mg Oral BID PRN Zac Hurley MD        menthol (Topical Analgesic) 2.5% (BENGAY VANISHING SCENT) 2.5 % topical gel   Topical 4x Daily PRN Yessica Villalobos MD   Given at 11/10/24 2136    QUEtiapine (SEROquel) quarter-tab 6.25 mg  6.25 mg Oral At Bedtime PRN Zac Hurley MD   6.25 mg at 11/14/24 2030         PHYSICAL EXAM  /77   Pulse 76   Temp 97.5  F (36.4  C) (Oral)   Resp 16   Ht 1.753 m (5' 9\")   Wt 102 kg (224 lb 13.9 oz)   SpO2 100%   BMI 33.21 kg/m    General: in no acute distress, conversational and alert, resting comfortably in chair  HEENT: Protective helmet in place. Nares clear, conjunctiva " white  Pulmonary: on room air, no acute distress. CTAB.   Cardiovascular: Warm, well perfused  Abdominal: soft, non-tender to palpation, non-distended  Extremities: warm peripherally, no edema in BLEs  Skin: warm, dry, without erythema, ecchymosis, or rash noted.   Neuro: Conjugate gaze, left facial droop. 2/5 strength in left shoulder abduction. 2/5 strength in left EF. 0/5 strength in left elbow extension. 3/5 strength in finger flexion. 1/5 strength in knee extension. 0/5 strength in left DF/PF. 1-2 beats of left ankle clonus.    Tone: MAS 1+/4  spasticity in left elbow extension and left knee extension. Otherwise no increased tone.     LABS  Results for orders placed or performed during the hospital encounter of 11/04/24 (from the past 24 hours)   Glucose by meter   Result Value Ref Range    GLUCOSE BY METER POCT 118 (H) 70 - 99 mg/dL   Glucose by meter   Result Value Ref Range    GLUCOSE BY METER POCT 130 (H) 70 - 99 mg/dL   Glucose by meter   Result Value Ref Range    GLUCOSE BY METER POCT 104 (H) 70 - 99 mg/dL   Glucose by meter   Result Value Ref Range    GLUCOSE BY METER POCT 111 (H) 70 - 99 mg/dL       ASSESSMENT AND PLAN  Carl Sierra is a 49 year old left hand dominant male with past medical history of HTN and undiagnosed GLENN, who initially presented to October 2 with hypertensive emergency, left upper extremity weakness and numbness due to right MCA ischemic stroke. He underwent right craniectomy on October 5 for the management of cerebral edema and midline shift. Hospital course was complicated but he clinically improved and was transferred to ARU.     Updates Today:  - Bowel Program: Miralax decreased to QD PRN. Senna decreased to 1 tab QD. Will continue bisacodyl suppository qPM following dinner.     Admission to acute inpatient rehab: 11/4/2024   Impairment group code: 01.1 Stroke      PT, OT and SLP 60 minutes of each on a daily basis, 6x a week, for 25 days,  in addition to rehab nursing and close  management of physiatrist.     Impairment of ADL's:  OT for 60 min daily, 6x a week for 25 days, to work on upper and lower body self care, dressing, toileting, bathing, energy conservation techniques with use of ADs as needed   Impairment of mobility:   PT for 60 min daily, 6x a week for 25 days, to work on strengthening, endurance buildup, transfers and most likely WC mobility.   Impairment of cognition/language/swallow:   SLP for 60 min daily, 6x a week  for cognitive evaluation and treatment strategies for higher level cognitive deficits and memory impairment   Rehab RN to administer medication, patient education on medication taking, VS monitoring, bowel regimen, glucose monitoring and wound care/surgical wound dressing changes and monitoring.      Medical Conditions  # Right MCA ischemic stroke s/p TNK (10/2/2024)  # Cerebral edema s/p decompressive craniectomy (10/5/24)  # Left Sided Hemiparesis, Flaccid  # Spasticity  Patient presented to [facility] on 10/2 with hypertensive emergency, left upper extremity numbness and weakness.  Stroke code was initiated on arrival and CTA head/neck revealed short segment severe stenosis of the posterior branch of the M2 segment of the right MCA. Decision was made to give tenecteplase.  This did not significantly improve his symptoms.  Brain MRI additionally revealed small right posterior cerebellar stroke and multiple infarcts. Completed 7 day course of keppra. Patient had left sided flaccid hemiplegia on admission, with some return of strength since that time. He has developed some increased tone in the LUE/LLE.   - continue ASA and plavix  - continue acetaminophen-caffeine (Excedrin Tension Headache) 500-65 mg tablet daily (was started on 10/25) . Could consider modafinil if fatigue is a persistent issue.   - stroke secondary prevention as below  - helmet when OOB   - follow up with neurosurgery ([facility]) for bone flap replacement after  discharge. Would be direct admission day prior for lumbar drain placement and bone flap replacement the following day.  - Outpatient stroke neurology follow up (6 weeks post discharge)  - Outpatient PM&R follow up (1-2 months post discharge).   - Baclofen 10 mg BID initiated on 11/14. Uptitrate as needed.       # Headaches  # Neck pain   # L knee pain   Headaches seem to be well controlled on tylenol PRN and excedrin. Typically starts towards the end of the day and also responded to lying flat and hydration. Pain seems myofascial vs AC joint related in nature with no clinical s/s of radiculopathy. L knee pain for 20 years due to sports injury and likely early OA.   - Discontinued robaxin to avoid concurrent dosing with baclofen.    - continue tylenol 650 mg Q4H PRN   - added topical bengay and diclofenac cream   - Gabapentin 300 mg at bedtime for headaches  - can consider TENS for the myofascial pain     # HTN  Not managed prior to admission . Has had intermittent hypertension while at ARU.   - continue metoprolol 50 bid   - continue losartan 50 bid (was dosed bid for better control of morning elevation in BP and can be changed to daily dosing)  - amlodipine 10 daily      # HLD  LDL 98 on 10/3.   - continue lipitor 40mg QD  - repeat lipid panel in 3 months      # T2DM  HgbA1c was 9.2 10/3  - DM education   - continue lantus 7 units bid. Could consider transition to oral agent.   - continue low dose sliding scale insulin   - BG check QID  - repeat HgbA1C in 3 months      # NICM/HFrEF  TTE performed at OSH. EF 40 to 45% with global hypokinesis but no intracardiac shunt   - repeat TTE and cardiology referral (if outpatient) around 12/5.      # Possible undiagnosed GLENN   Family reported episodes of apnea during the night. Has not needed nocturnal O2 while in ARU.   - continue prn O2  - formal sleep study as outpatient        # Normocytic Anemia   Mild and stable, likely due to chronic disease vs CHAD.   - Will continue  to monitor with qMon/Thurs CBC     # Insomnia   - continue melatonin   - Seroquel 6.25 mg PO HS PRN. Would discuss cessation with patient prior to discharge to TCU.       # Depression  # Adjustment to disability  Worsening mood in the setting of his stroke and disability.   - rehab psych consult   - spiritual health consult   - can consider selective serotonin reuptake inhibitor vs SNRI      # Neurogenic bowel  - Miralax one packet dailyPRN   - Senna 1 tab qAM  -  Bisacodyl 10mg suppository QD     # Neurogenic bladder:   - ok to use primofit  - will start using urinal during the day and possibly toileting using the commode pending his course       FEN: regular with thin. Continued B1 supplement.   DVT Prophylaxis: DAPT, lovenox 40 mg QD  GI Prophylaxis: Protonix 40 mg QD.  Code: Full - confirmed upon admission   Disposition: he will be WC based and will require assistance with all ADLs and mobility; home is not accessible. Will need as ramp and possibly chair lift for stairs.   ELOS/Discharge Date:  4 weeks and most likely TCU after that before discharge to home.  Rehab prognosis:  Good   Follow up Appointments on Discharge:   Outpatient PCP in 1-2 weeks.   Outpatient PM&R in 1-2 months.  Stroke neurology  (6-8 weeks post discharge)  Neurosurgery ([facility])  Heart Failure Clinic (With TTE ordered prior)   Sleep medicine (if desired by patient)   Psychology     Patient seen and discussed with Dr. Alicia CESAR, attending PM&R physician     --   Zac Hurley MD  [facility] PM&R PGY-2  Pager: [phone]

## 2024-11-15 NOTE — PLAN OF CARE
Discharge Planner Post-Acute Rehab SLP:      Discharge Plan: TCU. Ongoing SLP     Precautions: fall, crani/helmet, impulsivity     Current Status:  Hearing: WFL  Vision: glasses; significant visual impairment (baseline per patient) as well as left neglect/inattention  Communication: Mild dysarthria; speech intelligibility 80% in conversation  Cognition: Moderate cogntiive-communication impairment in areas of attention, memory, executive function and visuospatial skills.   Swallow: Regular diet and thin liquids (0). Set up assist as needed, upright position/chair position is ideal, small bites/sips, slow rate, and alternate solids and liquids. ARU swallow eval only, pt independent with swallow strategies.     Assessment:   Continued visual scanning, alternating attention task started prior afternoon. Pt required moderate cues from SLP to determine final two clues, mild to moderate cues to complete puzzle given letters from answers.        Other Barriers to Discharge (Family Training, etc): none if discharging to TCU.

## 2024-11-15 NOTE — PLAN OF CARE
Discharge Plan: home w/ assist. HHPT     Precautions: fall, crani, helmet OOB. Do not leave alone EOB/commode.     Current Status:  Bed Mobility: Savanna L, mod-A R rolling, Max A supine<>sit  Transfer: Liko Ax2  Gait: unable, unsafe  Stairs: unable, unsafe  Balance: Able to sit unsupported SBA to min-A. Dynamic posterior/L lean, pt works to correct and is aware of midline. Static standing with RUE support with up to modA for steadying, does best with visual feedback from mirror to maintain midline.     Outcome Measures:   PASS               11/5: 0/36  Rice              11/5: 0/56     Assessment: Focus on standing tolerance and weight shifting with RUE support with up to modA for lifting and steadying using mirror for visual feedback. Continued work toward transfers using emile steady with PT. Pt requires skilled set up and steadying in sitting position during transfers, especially with fatigue by end of session.      Other Barriers to Discharge (DME, Family Training, etc):   1 LUPE (threshold) + 15STI to access bedroom/bathroom. Option for main floor living in [facility].      DME: pending progress

## 2024-11-16 ENCOUNTER — APPOINTMENT (OUTPATIENT)
Dept: SPEECH THERAPY | Facility: CLINIC | Age: 49
DRG: 057 | End: 2024-11-16
Attending: PHYSICAL MEDICINE & REHABILITATION
Payer: COMMERCIAL

## 2024-11-16 ENCOUNTER — APPOINTMENT (OUTPATIENT)
Dept: PHYSICAL THERAPY | Facility: CLINIC | Age: 49
DRG: 057 | End: 2024-11-16
Attending: PHYSICAL MEDICINE & REHABILITATION
Payer: COMMERCIAL

## 2024-11-16 ENCOUNTER — APPOINTMENT (OUTPATIENT)
Dept: OCCUPATIONAL THERAPY | Facility: CLINIC | Age: 49
DRG: 057 | End: 2024-11-16
Attending: PHYSICAL MEDICINE & REHABILITATION
Payer: COMMERCIAL

## 2024-11-16 VITALS
BODY MASS INDEX: 32.62 KG/M2 | WEIGHT: 220.24 LBS | HEART RATE: 77 BPM | SYSTOLIC BLOOD PRESSURE: 128 MMHG | DIASTOLIC BLOOD PRESSURE: 83 MMHG | OXYGEN SATURATION: 99 % | TEMPERATURE: 97.7 F | RESPIRATION RATE: 16 BRPM | HEIGHT: 69 IN

## 2024-11-16 LAB
GLUCOSE BLDC GLUCOMTR-MCNC: 108 MG/DL (ref 70–99)
GLUCOSE BLDC GLUCOMTR-MCNC: 108 MG/DL (ref 70–99)
GLUCOSE BLDC GLUCOMTR-MCNC: 118 MG/DL (ref 70–99)
GLUCOSE BLDC GLUCOMTR-MCNC: 123 MG/DL (ref 70–99)

## 2024-11-16 PROCEDURE — 97112 NEUROMUSCULAR REEDUCATION: CPT | Mod: GP

## 2024-11-16 PROCEDURE — 97530 THERAPEUTIC ACTIVITIES: CPT | Mod: GP

## 2024-11-16 PROCEDURE — 250N000013 HC RX MED GY IP 250 OP 250 PS 637: Performed by: PHYSICAL MEDICINE & REHABILITATION

## 2024-11-16 PROCEDURE — 250N000013 HC RX MED GY IP 250 OP 250 PS 637: Performed by: STUDENT IN AN ORGANIZED HEALTH CARE EDUCATION/TRAINING PROGRAM

## 2024-11-16 PROCEDURE — 250N000013 HC RX MED GY IP 250 OP 250 PS 637

## 2024-11-16 PROCEDURE — 97130 THER IVNTJ EA ADDL 15 MIN: CPT | Mod: GN

## 2024-11-16 PROCEDURE — 97112 NEUROMUSCULAR REEDUCATION: CPT | Mod: GO | Performed by: OCCUPATIONAL THERAPIST

## 2024-11-16 PROCEDURE — 97110 THERAPEUTIC EXERCISES: CPT | Mod: GP

## 2024-11-16 PROCEDURE — 128N000003 HC R&B REHAB

## 2024-11-16 PROCEDURE — 97129 THER IVNTJ 1ST 15 MIN: CPT | Mod: GN

## 2024-11-16 PROCEDURE — 250N000011 HC RX IP 250 OP 636

## 2024-11-16 RX ORDER — CLOTRIMAZOLE 1 %
CREAM (GRAM) TOPICAL 2 TIMES DAILY
Status: DISCONTINUED | OUTPATIENT
Start: 2024-11-16 | End: 2024-11-22 | Stop reason: HOSPADM

## 2024-11-16 RX ADMIN — LOSARTAN POTASSIUM 50 MG: 50 TABLET, FILM COATED ORAL at 19:55

## 2024-11-16 RX ADMIN — LIDOCAINE 1 PATCH: 4 PATCH TOPICAL at 19:57

## 2024-11-16 RX ADMIN — SENNOSIDES 1 TABLET: 8.6 TABLET, FILM COATED ORAL at 08:44

## 2024-11-16 RX ADMIN — BISACODYL 10 MG: 10 SUPPOSITORY RECTAL at 18:43

## 2024-11-16 RX ADMIN — CLOTRIMAZOLE: 1 CREAM TOPICAL at 21:12

## 2024-11-16 RX ADMIN — PANTOPRAZOLE SODIUM 40 MG: 40 TABLET, DELAYED RELEASE ORAL at 06:41

## 2024-11-16 RX ADMIN — ACETAMINOPHEN 650 MG: 325 TABLET, FILM COATED ORAL at 20:22

## 2024-11-16 RX ADMIN — INSULIN GLARGINE 7 UNITS: 100 INJECTION, SOLUTION SUBCUTANEOUS at 19:56

## 2024-11-16 RX ADMIN — CLOPIDOGREL BISULFATE 75 MG: 75 TABLET ORAL at 08:44

## 2024-11-16 RX ADMIN — Medication: at 08:42

## 2024-11-16 RX ADMIN — INSULIN GLARGINE 7 UNITS: 100 INJECTION, SOLUTION SUBCUTANEOUS at 08:52

## 2024-11-16 RX ADMIN — Medication 5 MG: at 19:55

## 2024-11-16 RX ADMIN — ACETAMINOPHEN 650 MG: 325 TABLET, FILM COATED ORAL at 11:01

## 2024-11-16 RX ADMIN — BACLOFEN 10 MG: 10 TABLET ORAL at 08:44

## 2024-11-16 RX ADMIN — BACLOFEN 10 MG: 10 TABLET ORAL at 20:22

## 2024-11-16 RX ADMIN — ENOXAPARIN SODIUM 40 MG: 40 INJECTION SUBCUTANEOUS at 15:32

## 2024-11-16 RX ADMIN — METOPROLOL TARTRATE 50 MG: 50 TABLET, FILM COATED ORAL at 08:44

## 2024-11-16 RX ADMIN — LOSARTAN POTASSIUM 50 MG: 50 TABLET, FILM COATED ORAL at 08:44

## 2024-11-16 RX ADMIN — DICLOFENAC SODIUM TOPICAL GEL, 1% 4 G: 10 GEL TOPICAL at 08:43

## 2024-11-16 RX ADMIN — Medication 6.25 MG: at 22:21

## 2024-11-16 RX ADMIN — ASPIRIN 81 MG CHEWABLE TABLET 81 MG: 81 TABLET CHEWABLE at 08:44

## 2024-11-16 RX ADMIN — THIAMINE HCL TAB 100 MG 100 MG: 100 TAB at 08:44

## 2024-11-16 RX ADMIN — ATORVASTATIN CALCIUM 40 MG: 40 TABLET, FILM COATED ORAL at 19:55

## 2024-11-16 RX ADMIN — ACETAMINOPHEN, CAFFEINE 1 TABLET: 500; 65 TABLET, FILM COATED ORAL at 08:44

## 2024-11-16 RX ADMIN — METOPROLOL TARTRATE 50 MG: 50 TABLET, FILM COATED ORAL at 19:55

## 2024-11-16 RX ADMIN — AMLODIPINE BESYLATE 10 MG: 10 TABLET ORAL at 08:44

## 2024-11-16 NOTE — PLAN OF CARE
Goal Outcome Evaluation:      Plan of Care Reviewed With: patient    Overall Patient Progress: no changeOverall Patient Progress: no change     Patient slept most of the night, no complained of pain, no respiratory distress, appeared to be comfortable on bed during rounding checks. Primo fit in placed, voiding well, had BM to the bedpan this shift.   No significant changed overnight  Plan of care ongoing.

## 2024-11-16 NOTE — PLAN OF CARE
Goal Outcome Evaluation:    Alert and oriented x4, makes needs known and calls appropriately.  Endorses 4-5/10 right shoulder pain, lidocaine patch applied and repositioning done. Denies CP, sob, dizziness or lightheadedness.  Pt on bowel program, last BM tonight after the suppository.   He had a shower tonight. HS blood sugar is 104. Snacks with orange juice and crackers provided.   Primofit placed per request.  Pt to wear helmet when OOB.   Family at bedside tonight.  Fall precautions maintained. Call light in reach.

## 2024-11-16 NOTE — PROGRESS NOTES
Discharge Planner Post-Acute Rehab OT:      Discharge Plan: TCU     Precautions: Falls, Craniectomy - helmet OOB, LUE/LLE hemiparesis, L neglect.  *Safe sitting plan     Current Status:  ADLs:  Mobility: A x 2 bed mobility. Lift transfer. Wheelchair based in Broda chair.   Grooming: Min A seated.  Dressing: UB Max A seated in chair. LB A x 2 supine.   Bathing: liko lift Ax2 to/from purple shower chair/commode. Assistance with washing/rinsing/drying 6/10 body parts.   Toileting: Liko lift Ax2 to/from purple dependent shower chair/commode. Dependent hygiene/clothing.  IADLs: PLOF IND. Recommend assist.  Vision/Cognition: Full time corrective lenses. L neglect, possible peripheral field deficits. Moderate cognitive deficits attention, recall, comprehension, problem solving.      Assessment: Utilized Xcite for postural control, refer to care plan note for further details.      Other Barriers to Discharge (DME, Family Training, etc):   Home set up: 1 LUPE. 16 stairs to access bedroom/bathroom. Can stay on main level with bedroom/bathroom but no shower.   Caregiver support: Partner working remotely, involved and present on unit.   Equipment needs pending progress.

## 2024-11-16 NOTE — PLAN OF CARE
Discharge Plan: home w/ assist. HHPT     Precautions: fall, crani, helmet OOB. Do not leave alone EOB/commode.     Current Status:  Bed Mobility: Savanna L, mod-A R rolling, Max A supine<>sit  Transfer: Liko Ax2  Gait: unable, unsafe  Stairs: unable, unsafe  Balance: Able to sit unsupported SBA to min-A. Dynamic posterior/L lean, pt works to correct and is aware of midline. Static standing with RUE support with up to modA for steadying, does best with visual feedback from mirror to maintain midline.     Outcome Measures:   PASS               11/5: 0/36  Rice              11/5: 0/56     Assessment: tolerated active sitting edge of bed; sit to stand x 7 reps in emile steady min A x 1;  static standing re ed in emile steady; emerging gross motor return in L UE and LE noting hip extension and L shoulder IR, flexion > extension, gross finger flexion on command; progressing       Other Barriers to Discharge (DME, Family Training, etc):   1 LUPE (threshold) + 15STI to access bedroom/bathroom. Option for main floor living in [facility].      DME: pending progress

## 2024-11-16 NOTE — PLAN OF CARE
Goal Outcome Evaluation:      Plan of Care Reviewed With: patient    Overall Patient Progress: no changeOverall Patient Progress: no change         Orientation: Alert/oriented x4  Bowel: Mixed continents. LBM 11/16.   Bladder: Continent this shift. Primo fit at night.   Pain: Patient stated right shoulder pain 3/10 managed by PRN tylenol and diclofenac gel.   Ambulation/Transfers: A2 liko lift  Blood sugars: QID BG checks. , Noon 123, Dinner 118. No sliding scale given this shift.   Diet/ Liquids: Regular diet thin liquids, medications whole one at a time.   Oxygen: on Room Air  Skin:  Dry skin patches to abdomen, armpits and groin. New order for Lotrimin antifungal.     Bed alarm on for safety, call light within reach. Continue with POC.

## 2024-11-16 NOTE — PLAN OF CARE
Goal Outcome Evaluation:      Plan of Care Reviewed With: patient    Overall Patient Progress: no changeOverall Patient Progress: no change         Orientation: Alert/oriented x4. Family present at bedside  Bowel: Mixed continents. Patient on Bowel program suppository given and transferred to purple shower chair. LBM 11/15  Bladder: Incontinent, uses primofit at night  Pain: Patient complained of Right shoulder pain. PRN tylenol provided with relief.   Ambulation/Transfers: A2 liko lift.   Blood sugars: QID BG checks  Noon 112 Dinner 122  Diet/ Liquids: Regular diet, thin liquids, Medications whole 1 at a time.   Oxygen: on Room Air  Skin: Dry flaky skin to armpits and groin area.     Patient received shower on evening shift.   Bed alarm on for safety, call light within reach. Continue with POC.

## 2024-11-16 NOTE — PROGRESS NOTES
XCite stim unit for Activity Based Therapy. Skilled set up; determined appropriate FES parameters for each muscle group based off of strong tetanic response of muscle test.  Used the following activities from the software library: General  Intervention and patient response:Pt completed 30 repetitions x2 sets of postural control with writer guiding through transitional movements

## 2024-11-16 NOTE — PLAN OF CARE
Discharge Planner Post-Acute Rehab SLP:      Discharge Plan: TCU. Ongoing SLP     Precautions: fall, crani/helmet, impulsivity     Current Status:  Hearing: WFL  Vision: glasses; significant visual impairment (baseline per patient) as well as left neglect/inattention  Communication: Mild dysarthria; speech intelligibility 80% in conversation  Cognition: Moderate cogntiive-communication impairment in areas of attention, memory, executive function and visuospatial skills.   Swallow: Regular diet and thin liquids (0). Set up assist as needed, upright position/chair position is ideal, small bites/sips, slow rate, and alternate solids and liquids. ARU swallow eval only, pt independent with swallow strategies.     Assessment: Reviewed short-term memory strategies and pt implemented in category recall task with overall mod cues. Pt did not engaged in repetition/rehearsal despite encouragement, but benefited from chunking and association. Pt recalled 11/16 words and 3/4 categories independently following use of strategies. With semantic cues, accuracy increased to 14/16. Pt encouraged to increase # of repetitions and slow down when encoding information. Pt engaged in simple left scanning task to identify errors. Pt did not want to use red line as visual anchor today despite encouragement, and he required mod-max cues to scan left d/t same. Pt identified 50% of errors in increased processing time and independently verbalized text out loud which was good carryover of learning from previous task. Given cues for left scanning, identification of errors increased to 80%. Left tasks and word-finds for HW.      Other Barriers to Discharge (Family Training, etc): none if discharging to TCU.

## 2024-11-17 ENCOUNTER — APPOINTMENT (OUTPATIENT)
Dept: OCCUPATIONAL THERAPY | Facility: CLINIC | Age: 49
DRG: 057 | End: 2024-11-17
Attending: PHYSICAL MEDICINE & REHABILITATION
Payer: COMMERCIAL

## 2024-11-17 LAB
GLUCOSE BLDC GLUCOMTR-MCNC: 124 MG/DL (ref 70–99)
GLUCOSE BLDC GLUCOMTR-MCNC: 149 MG/DL (ref 70–99)
GLUCOSE BLDC GLUCOMTR-MCNC: 89 MG/DL (ref 70–99)
GLUCOSE BLDC GLUCOMTR-MCNC: 96 MG/DL (ref 70–99)

## 2024-11-17 PROCEDURE — 97112 NEUROMUSCULAR REEDUCATION: CPT | Mod: GO | Performed by: OCCUPATIONAL THERAPIST

## 2024-11-17 PROCEDURE — 250N000013 HC RX MED GY IP 250 OP 250 PS 637: Performed by: PHYSICAL MEDICINE & REHABILITATION

## 2024-11-17 PROCEDURE — 250N000011 HC RX IP 250 OP 636

## 2024-11-17 PROCEDURE — 128N000003 HC R&B REHAB

## 2024-11-17 PROCEDURE — 99231 SBSQ HOSP IP/OBS SF/LOW 25: CPT | Performed by: STUDENT IN AN ORGANIZED HEALTH CARE EDUCATION/TRAINING PROGRAM

## 2024-11-17 PROCEDURE — 250N000012 HC RX MED GY IP 250 OP 636 PS 637

## 2024-11-17 PROCEDURE — 250N000013 HC RX MED GY IP 250 OP 250 PS 637

## 2024-11-17 RX ADMIN — CLOPIDOGREL BISULFATE 75 MG: 75 TABLET ORAL at 09:21

## 2024-11-17 RX ADMIN — SENNOSIDES 1 TABLET: 8.6 TABLET, FILM COATED ORAL at 09:21

## 2024-11-17 RX ADMIN — Medication 5 MG: at 20:43

## 2024-11-17 RX ADMIN — LOSARTAN POTASSIUM 50 MG: 50 TABLET, FILM COATED ORAL at 20:43

## 2024-11-17 RX ADMIN — BACLOFEN 10 MG: 10 TABLET ORAL at 20:43

## 2024-11-17 RX ADMIN — CLOTRIMAZOLE: 1 CREAM TOPICAL at 20:43

## 2024-11-17 RX ADMIN — LOSARTAN POTASSIUM 50 MG: 50 TABLET, FILM COATED ORAL at 09:21

## 2024-11-17 RX ADMIN — THIAMINE HCL TAB 100 MG 100 MG: 100 TAB at 09:21

## 2024-11-17 RX ADMIN — LIDOCAINE 1 PATCH: 4 PATCH TOPICAL at 20:43

## 2024-11-17 RX ADMIN — ATORVASTATIN CALCIUM 40 MG: 40 TABLET, FILM COATED ORAL at 20:43

## 2024-11-17 RX ADMIN — INSULIN GLARGINE 7 UNITS: 100 INJECTION, SOLUTION SUBCUTANEOUS at 09:30

## 2024-11-17 RX ADMIN — INSULIN GLARGINE 7 UNITS: 100 INJECTION, SOLUTION SUBCUTANEOUS at 20:42

## 2024-11-17 RX ADMIN — INSULIN ASPART 1 UNITS: 100 INJECTION, SOLUTION INTRAVENOUS; SUBCUTANEOUS at 17:19

## 2024-11-17 RX ADMIN — AMLODIPINE BESYLATE 10 MG: 10 TABLET ORAL at 09:21

## 2024-11-17 RX ADMIN — ENOXAPARIN SODIUM 40 MG: 40 INJECTION SUBCUTANEOUS at 15:15

## 2024-11-17 RX ADMIN — BACLOFEN 10 MG: 10 TABLET ORAL at 09:21

## 2024-11-17 RX ADMIN — BISACODYL 10 MG: 10 SUPPOSITORY RECTAL at 18:39

## 2024-11-17 RX ADMIN — PANTOPRAZOLE SODIUM 40 MG: 40 TABLET, DELAYED RELEASE ORAL at 06:20

## 2024-11-17 RX ADMIN — ACETAMINOPHEN 650 MG: 325 TABLET, FILM COATED ORAL at 21:50

## 2024-11-17 RX ADMIN — ACETAMINOPHEN, CAFFEINE 1 TABLET: 500; 65 TABLET, FILM COATED ORAL at 09:21

## 2024-11-17 RX ADMIN — Medication 6.25 MG: at 21:50

## 2024-11-17 RX ADMIN — ASPIRIN 81 MG CHEWABLE TABLET 81 MG: 81 TABLET CHEWABLE at 09:21

## 2024-11-17 RX ADMIN — DICLOFENAC SODIUM TOPICAL GEL, 1% 4 G: 10 GEL TOPICAL at 15:40

## 2024-11-17 RX ADMIN — METOPROLOL TARTRATE 50 MG: 50 TABLET, FILM COATED ORAL at 20:43

## 2024-11-17 RX ADMIN — DICLOFENAC SODIUM TOPICAL GEL, 1% 4 G: 10 GEL TOPICAL at 09:35

## 2024-11-17 RX ADMIN — METOPROLOL TARTRATE 50 MG: 50 TABLET, FILM COATED ORAL at 09:21

## 2024-11-17 RX ADMIN — CLOTRIMAZOLE: 1 CREAM TOPICAL at 09:32

## 2024-11-17 NOTE — PLAN OF CARE
Goal Outcome Evaluation:      Plan of Care Reviewed With: patient    Overall Patient Progress: no changeOverall Patient Progress: no change           Orientation: Alert/oriented x4  Bowel: Mixed continents. LBM 11/16.   Bladder: Continent this shift. Primo fit at night.   Pain: Patient stated right shoulder pain 3/10 managed by PRN tylenol and diclofenac gel.   Ambulation/Transfers: A2 liko lift  Blood sugars: QID BG checks. See results tab for details.   Diet/ Liquids: Regular diet thin liquids, medications whole one at a time.   Oxygen: on Room Air  Skin:  Dry skin patches to abdomen, armpits and groin. Lotrimin applied.      Bed alarm on for safety, call light within reach. Continue with POC.

## 2024-11-17 NOTE — PROGRESS NOTES
[facility]   Acute Rehabilitation Unit  Daily Progress Note    INTERVAL HISTORY  Carl was seen in his room this morning. Denies any concerns. Developed a rash, looks fungal on abdomen over weekend. BID clotrimazole ordered. Denies any headaches, SOB, CP, abdominal pain. Family present this morning.    ROS: 10 point ROS was assessed and was negative unless otherwise stated in HPI    Functional  PT: 11/13  Bed Mobility: Mod-A L, Max-A R rolling and supine<>sit  Transfer: Liko Ax2  Gait: unable, unsafe  Stairs: unable, unsafe  Balance: Able to sit unsupported SBA to min-A. Dynamic posterior/L lean, pt works to correct and is aware of midline  Outcome Measures:   PASS  11/5: 0/36  Rice 11/5: 0/56     OT: 11/14  ADLs:  Mobility: A x 2 bed mobility. Lift transfer. Wheelchair based in Broda chair.   Grooming: Min A seated.  Dressing: UB A x 2 seated in chair. LB A x 2 supine.   Bathing: liko lift Ax2 to/from purple shower chair/commode. Assistance with washing/rinsing/drying 6/10 body parts.   Toileting: Liko lift Ax2 to/from purple dependent shower chair/commode. Dependent hygiene/clothing.  IADLs: PLOF IND. Recommend assist.  Vision/Cognition: Full time corrective lenses. L neglect, possible peripheral field deficits. Moderate cognitive deficits attention, recall, comprehension, problem solving.     SLP: 11/14  Hearing: WFL  Vision: glasses; significant visual impairment (baseline per patient) as well as left neglect/inattention  Communication: Mild dysarthria; speech intelligibility 80% in conversation  Cognition: Moderate cogntiive-communication impairment in areas of attention, memory, executive function and visuospatial skills.   Swallow: Regular diet and thin liquids (0). Set up assist as needed, upright position/chair position is ideal, small bites/sips, slow rate, and alternate solids and liquids. ARU swallow eval only, pt independent with swallow  strategies.    MEDICATIONS  Scheduled meds  Current Facility-Administered Medications   Medication Dose Route Frequency Provider Last Rate Last Admin    acetaminophen-caffeine (EXCEDRIN TENSION HEADACHE) 500-65 MG tablet 1 tablet  1 tablet Oral Daily Angel Vargas DO   1 tablet at 11/17/24 0921    amLODIPine (NORVASC) tablet 10 mg  10 mg Oral Daily Yessica Villalobos MD   10 mg at 11/17/24 0921    aspirin (ASA) chewable tablet 81 mg  81 mg Oral Daily Zac Hurley MD   81 mg at 11/17/24 0921    atorvastatin (LIPITOR) tablet 40 mg  40 mg Oral QPM Zac Hurley MD   40 mg at 11/16/24 1955    baclofen (LIORESAL) tablet 10 mg  10 mg Oral BID Zac Hurley MD   10 mg at 11/17/24 0921    bisacodyl (DULCOLAX) suppository 10 mg  10 mg Rectal Daily Angel Vargas DO   10 mg at 11/16/24 1843    clopidogrel (PLAVIX) tablet 75 mg  75 mg Oral Daily Zac Hurley MD   75 mg at 11/17/24 0921    clotrimazole (LOTRIMIN) 1 % cream   Topical BID Rosio Montague MD   Given at 11/17/24 0932    enoxaparin ANTICOAGULANT (LOVENOX) injection 40 mg  40 mg Subcutaneous Q24H Zac Hurley MD   40 mg at 11/16/24 1532    insulin aspart (NovoLOG) injection (RAPID ACTING)  1-3 Units Subcutaneous TID AC Zac Hurley MD        insulin aspart (NovoLOG) injection (RAPID ACTING)  1-3 Units Subcutaneous At Bedtime Zac Hurley MD        insulin glargine (LANTUS PEN) injection 7 Units  7 Units Subcutaneous QAM AC Zac Hurley MD   7 Units at 11/17/24 0930    insulin glargine (LANTUS PEN) injection 7 Units  7 Units Subcutaneous At Bedtime Zac Hurley MD   7 Units at 11/16/24 1956    Lidocaine (LIDOCARE) 4 % Patch 1 patch  1 patch Transdermal Q24h Angel Vargas DO   1 patch at 11/16/24 1957    losartan (COZAAR) tablet 50 mg  50 mg Oral BID Yessica Villalobos MD   50 mg at 11/17/24 0921    melatonin tablet 5 mg  5 mg Oral At Bedtime Angel Vargas DO   5 mg at 11/16/24 1955    metoprolol tartrate  "(LOPRESSOR) tablet 50 mg  50 mg Oral BID Yessica Villalobos MD   50 mg at 11/17/24 0921    pantoprazole (PROTONIX) EC tablet 40 mg  40 mg Oral QAM AC Yessica Villalobos MD   40 mg at 11/17/24 0620    sennosides (SENOKOT) tablet 1 tablet  1 tablet Oral Daily Zac Hurley MD   1 tablet at 11/17/24 0921    thiamine (B-1) tablet 100 mg  100 mg Oral Daily Zac Hurley MD   100 mg at 11/17/24 0921       PRN meds:  Current Facility-Administered Medications   Medication Dose Route Frequency Provider Last Rate Last Admin    acetaminophen (TYLENOL) tablet 650 mg  650 mg Oral Q4H PRN Zac Hurley MD   650 mg at 11/16/24 2022    glucose gel 15-30 g  15-30 g Oral Q15 Min PRN Zac Hurley MD        Or    dextrose 50 % injection 25-50 mL  25-50 mL Intravenous Q15 Min PRN Zac Hurley MD        Or    glucagon injection 1 mg  1 mg Subcutaneous Q15 Min PRN Zac Hurley MD        diclofenac (VOLTAREN) 1 % topical gel 4 g  4 g Topical 4x Daily PRN Zac Hurley MD   4 g at 11/17/24 0935    famotidine (PEPCID) tablet 20 mg  20 mg Oral BID PRN Zac Hurley MD        menthol (Topical Analgesic) 2.5% (BENGAY VANISHING SCENT) 2.5 % topical gel   Topical 4x Daily PRN Yessica Villalobos MD   Given at 11/16/24 0842    polyethylene glycol (MIRALAX) Packet 17 g  17 g Oral Daily PRN Zac Hurley MD        QUEtiapine (SEROquel) quarter-tab 6.25 mg  6.25 mg Oral At Bedtime PRN Zac Hurley MD   6.25 mg at 11/16/24 2221         PHYSICAL EXAM  /69   Pulse 82   Temp 98.3  F (36.8  C) (Oral)   Resp 16   Ht 1.753 m (5' 9\")   Wt 97 kg (213 lb 13.5 oz)   SpO2 98%   BMI 31.58 kg/m    General: in no acute distress, conversational and alert, resting comfortably in bed. Family present.  HEENT: Nares clear, conjunctiva white  Pulmonary: on room air, no acute distress. CTAB.   Cardiovascular: Warm, well perfused  Abdominal: soft, non-tender to palpation, non-distended  Extremities: warm peripherally, no edema in " BLEs  Skin: warm, dry, without erythema, ecchymosis, or rash noted.   Neuro: Conjugate gaze, left facial droop.   Tone: MAS 1+/4  spasticity in left elbow extension and left knee extension. Otherwise no increased tone.     LABS  Results for orders placed or performed during the hospital encounter of 11/04/24 (from the past 24 hours)   Glucose by meter   Result Value Ref Range    GLUCOSE BY METER POCT 123 (H) 70 - 99 mg/dL   Glucose by meter   Result Value Ref Range    GLUCOSE BY METER POCT 118 (H) 70 - 99 mg/dL   Glucose by meter   Result Value Ref Range    GLUCOSE BY METER POCT 108 (H) 70 - 99 mg/dL   Glucose by meter   Result Value Ref Range    GLUCOSE BY METER POCT 89 70 - 99 mg/dL       ASSESSMENT AND PLAN  Carl Sierra is a 49 year old left hand dominant male with past medical history of HTN and undiagnosed GELNN, who initially presented to October 2 with hypertensive emergency, left upper extremity weakness and numbness due to right MCA ischemic stroke. He underwent right craniectomy on October 5 for the management of cerebral edema and midline shift. Hospital course was complicated but he clinically improved and was transferred to ARU.     Updates Today:  - Bowel Program: Miralax decreased to QD PRN. Senna decreased to 1 tab QD. Will continue bisacodyl suppository qPM following dinner.     Admission to acute inpatient rehab: 11/4/2024   Impairment group code: 01.1 Stroke      --Vitals stable. Clotrimazole ordered for fungal rash on abdomen over weekend.  --Continue ongoing medical management.  --Continue therapies and plan of care.     Rosio Montague MD  Physical Medicine & Rehabilitation     I spent a total of 30 minutes face-to-face and managing the care of the patient. Over 50% of my time on the unit was spent counseling the patient and coordinating care. See note for details.

## 2024-11-17 NOTE — PROGRESS NOTES
Discharge Planner Post-Acute Rehab OT:      Discharge Plan: TCU     Precautions: Falls, Craniectomy - helmet OOB, LUE/LLE hemiparesis, L neglect.  *Safe sitting plan     Current Status:  ADLs:  Mobility: max A bed mobility. Lift transfer. Wheelchair based in TIS W/C   Grooming: Min A seated.  Dressing: UB Max A seated in chair. LB A x 2 supine.   Bathing: liko lift Ax2 to/from purple shower chair/commode. Assistance with washing/rinsing/drying 6/10 body parts.   Toileting: Liko lift Ax2 to/from purple dependent shower chair/commode. Dependent hygiene/clothing.  IADLs: PLOF IND. Recommend assist.  Vision/Cognition: Full time corrective lenses. L neglect, possible peripheral field deficits. Moderate cognitive deficits attention, recall, comprehension, problem solving.      Assessment: Utilized FES for LUE neuro re-education and sensorimotor skills; refer to care plan note for further details.      Other Barriers to Discharge (DME, Family Training, etc):   Home set up: 1 LUPE. 16 stairs to access bedroom/bathroom. Can stay on main level with bedroom/bathroom but no shower.   Caregiver support: Partner working remotely, involved and present on unit.   Equipment needs pending progress.

## 2024-11-17 NOTE — PLAN OF CARE
Goal Outcome Evaluation:      Plan of Care Reviewed With: patient    Overall Patient Progress: no changeOverall Patient Progress: no change         Here for therapy s/p stroke with left sided weaknes. Alert and oriented. Denies CP, SOB. Mixed continent, primo fit in place. LBM 11/16. Regular/Thin/Whole. No acute events overnight. Bed alarm on.

## 2024-11-17 NOTE — PLAN OF CARE
[id number]    Goal Outcome Evaluation:      Plan of Care Reviewed With: patient    Overall Patient Progress: no changeOverall Patient Progress: no change    Pt is alert, able to make needs known  Regular diet  Take medications whole  Primofit at night  Last BM 11/16/24  Pain managed by PRN Tylenol  BG at   Vitals stable, on room air  Bed alarm on for safety, call light within reach. Continue with POC

## 2024-11-17 NOTE — PROGRESS NOTES
Skilled set up on :  Pt dependent for proper placement of electrodes on LUE for optimum muscle contraction, safe positioning on w/c within frame and cushion for prevention of skin breakdown, feet secured to foot pedals, w/c secured to  w/ Q-straints.  Passive motion assessed to ensure proper positioning.       Pt performed 34 minutes of active FES ergometry with 100% stimulation applied to above muscles at 30 rpm with .5 Nm resistance.  This OT adjusted e-stim and cycling parameters in real-time to ensure palpable muscle contractions throughout session.       Changes in parameters that were part of this treatment:   -resistance    Functional outcomes from this intervention include:   -reduced spasticity  -improved sensory awareness and proprioception  -improved muscle strength  -improved motor coordination

## 2024-11-18 ENCOUNTER — APPOINTMENT (OUTPATIENT)
Dept: SPEECH THERAPY | Facility: CLINIC | Age: 49
DRG: 057 | End: 2024-11-18
Attending: PHYSICAL MEDICINE & REHABILITATION
Payer: COMMERCIAL

## 2024-11-18 ENCOUNTER — APPOINTMENT (OUTPATIENT)
Dept: PHYSICAL THERAPY | Facility: CLINIC | Age: 49
DRG: 057 | End: 2024-11-18
Attending: PHYSICAL MEDICINE & REHABILITATION
Payer: COMMERCIAL

## 2024-11-18 ENCOUNTER — APPOINTMENT (OUTPATIENT)
Dept: OCCUPATIONAL THERAPY | Facility: CLINIC | Age: 49
DRG: 057 | End: 2024-11-18
Attending: PHYSICAL MEDICINE & REHABILITATION
Payer: COMMERCIAL

## 2024-11-18 LAB
ANION GAP SERPL CALCULATED.3IONS-SCNC: 12 MMOL/L (ref 7–15)
BUN SERPL-MCNC: 7.5 MG/DL (ref 6–20)
CALCIUM SERPL-MCNC: 9.1 MG/DL (ref 8.8–10.4)
CHLORIDE SERPL-SCNC: 106 MMOL/L (ref 98–107)
CREAT SERPL-MCNC: 0.78 MG/DL (ref 0.67–1.17)
EGFRCR SERPLBLD CKD-EPI 2021: >90 ML/MIN/1.73M2
ERYTHROCYTE [DISTWIDTH] IN BLOOD BY AUTOMATED COUNT: 16.7 % (ref 10–15)
GLUCOSE BLDC GLUCOMTR-MCNC: 108 MG/DL (ref 70–99)
GLUCOSE BLDC GLUCOMTR-MCNC: 119 MG/DL (ref 70–99)
GLUCOSE BLDC GLUCOMTR-MCNC: 122 MG/DL (ref 70–99)
GLUCOSE BLDC GLUCOMTR-MCNC: 129 MG/DL (ref 70–99)
GLUCOSE SERPL-MCNC: 100 MG/DL (ref 70–99)
HCO3 SERPL-SCNC: 22 MMOL/L (ref 22–29)
HCT VFR BLD AUTO: 34.7 % (ref 40–53)
HGB BLD-MCNC: 11.3 G/DL (ref 13.3–17.7)
MCH RBC QN AUTO: 26 PG (ref 26.5–33)
MCHC RBC AUTO-ENTMCNC: 32.6 G/DL (ref 31.5–36.5)
MCV RBC AUTO: 80 FL (ref 78–100)
PLATELET # BLD AUTO: 205 10E3/UL (ref 150–450)
POTASSIUM SERPL-SCNC: 3.8 MMOL/L (ref 3.4–5.3)
RBC # BLD AUTO: 4.35 10E6/UL (ref 4.4–5.9)
SODIUM SERPL-SCNC: 140 MMOL/L (ref 135–145)
WBC # BLD AUTO: 4.9 10E3/UL (ref 4–11)

## 2024-11-18 PROCEDURE — 250N000011 HC RX IP 250 OP 636

## 2024-11-18 PROCEDURE — 250N000013 HC RX MED GY IP 250 OP 250 PS 637: Performed by: PHYSICAL MEDICINE & REHABILITATION

## 2024-11-18 PROCEDURE — 97112 NEUROMUSCULAR REEDUCATION: CPT | Mod: GO | Performed by: OCCUPATIONAL THERAPIST

## 2024-11-18 PROCEDURE — 97130 THER IVNTJ EA ADDL 15 MIN: CPT | Mod: GN

## 2024-11-18 PROCEDURE — 82374 ASSAY BLOOD CARBON DIOXIDE: CPT

## 2024-11-18 PROCEDURE — 128N000003 HC R&B REHAB

## 2024-11-18 PROCEDURE — 99231 SBSQ HOSP IP/OBS SF/LOW 25: CPT | Mod: GC | Performed by: PHYSICAL MEDICINE & REHABILITATION

## 2024-11-18 PROCEDURE — 250N000012 HC RX MED GY IP 250 OP 636 PS 637

## 2024-11-18 PROCEDURE — 97112 NEUROMUSCULAR REEDUCATION: CPT | Mod: GP

## 2024-11-18 PROCEDURE — 80048 BASIC METABOLIC PNL TOTAL CA: CPT

## 2024-11-18 PROCEDURE — 36415 COLL VENOUS BLD VENIPUNCTURE: CPT

## 2024-11-18 PROCEDURE — 85027 COMPLETE CBC AUTOMATED: CPT

## 2024-11-18 PROCEDURE — 97129 THER IVNTJ 1ST 15 MIN: CPT | Mod: GN

## 2024-11-18 PROCEDURE — 97535 SELF CARE MNGMENT TRAINING: CPT | Mod: GO | Performed by: OCCUPATIONAL THERAPIST

## 2024-11-18 PROCEDURE — 82310 ASSAY OF CALCIUM: CPT

## 2024-11-18 PROCEDURE — 250N000013 HC RX MED GY IP 250 OP 250 PS 637

## 2024-11-18 RX ORDER — CAPSAICIN 0.75 MG/G
CREAM TOPICAL 3 TIMES DAILY PRN
Status: DISCONTINUED | OUTPATIENT
Start: 2024-11-18 | End: 2024-11-20

## 2024-11-18 RX ADMIN — METOPROLOL TARTRATE 50 MG: 50 TABLET, FILM COATED ORAL at 08:34

## 2024-11-18 RX ADMIN — SENNOSIDES 1 TABLET: 8.6 TABLET, FILM COATED ORAL at 07:30

## 2024-11-18 RX ADMIN — BACLOFEN 10 MG: 10 TABLET ORAL at 08:34

## 2024-11-18 RX ADMIN — ASPIRIN 81 MG CHEWABLE TABLET 81 MG: 81 TABLET CHEWABLE at 07:30

## 2024-11-18 RX ADMIN — ACETAMINOPHEN, CAFFEINE 1 TABLET: 500; 65 TABLET, FILM COATED ORAL at 07:37

## 2024-11-18 RX ADMIN — LOSARTAN POTASSIUM 50 MG: 50 TABLET, FILM COATED ORAL at 07:31

## 2024-11-18 RX ADMIN — INSULIN GLARGINE 7 UNITS: 100 INJECTION, SOLUTION SUBCUTANEOUS at 19:49

## 2024-11-18 RX ADMIN — BACLOFEN 10 MG: 10 TABLET ORAL at 19:45

## 2024-11-18 RX ADMIN — BISACODYL 10 MG: 10 SUPPOSITORY RECTAL at 19:48

## 2024-11-18 RX ADMIN — INSULIN GLARGINE 7 UNITS: 100 INJECTION, SOLUTION SUBCUTANEOUS at 07:29

## 2024-11-18 RX ADMIN — PANTOPRAZOLE SODIUM 40 MG: 40 TABLET, DELAYED RELEASE ORAL at 04:41

## 2024-11-18 RX ADMIN — AMLODIPINE BESYLATE 10 MG: 10 TABLET ORAL at 07:30

## 2024-11-18 RX ADMIN — CLOPIDOGREL BISULFATE 75 MG: 75 TABLET ORAL at 07:31

## 2024-11-18 RX ADMIN — ENOXAPARIN SODIUM 40 MG: 40 INJECTION SUBCUTANEOUS at 14:17

## 2024-11-18 RX ADMIN — Medication 6.25 MG: at 22:05

## 2024-11-18 RX ADMIN — CLOTRIMAZOLE: 1 CREAM TOPICAL at 19:52

## 2024-11-18 RX ADMIN — METOPROLOL TARTRATE 50 MG: 50 TABLET, FILM COATED ORAL at 19:45

## 2024-11-18 RX ADMIN — CLOTRIMAZOLE: 1 CREAM TOPICAL at 08:34

## 2024-11-18 RX ADMIN — ATORVASTATIN CALCIUM 40 MG: 40 TABLET, FILM COATED ORAL at 19:45

## 2024-11-18 RX ADMIN — LOSARTAN POTASSIUM 50 MG: 50 TABLET, FILM COATED ORAL at 19:45

## 2024-11-18 RX ADMIN — LIDOCAINE 1 PATCH: 4 PATCH TOPICAL at 19:58

## 2024-11-18 RX ADMIN — THIAMINE HCL TAB 100 MG 100 MG: 100 TAB at 07:31

## 2024-11-18 RX ADMIN — Medication 5 MG: at 19:45

## 2024-11-18 RX ADMIN — ACETAMINOPHEN 650 MG: 325 TABLET, FILM COATED ORAL at 21:43

## 2024-11-18 NOTE — PROGRESS NOTES
XCite stim unit for Activity Based Therapy. Skilled set up; determined appropriate FES parameters for each muscle group based off of strong tetanic response of muscle test.  Used the following activities from the software library: General  Intervention and patient response:Pt completed 50 repetitions x2 sets of postural control with writer guiding through transitional movements

## 2024-11-18 NOTE — PLAN OF CARE
Goal Outcome Evaluation:      Plan of Care Reviewed With: patient, significant other  Overall Patient Progress: no change  Overall Patient Progress: no change        Orientation: AOX4; Forgetful  Bowel: Incontinent  LBM: 11-18; Soft, Brown, Medium  Bladder: Incontinent; Used Primo fit earlier  Pain: Denies  Ambulation/Transfers: AX2 Liko Lift  Blood sugars: Last BG @  Diet/Liquids: Combination Regular Diet; Thin Liquids; Pills Whole 1x1  Tubes/Lines/Drains: N/A  Skin: WDL x Integrity, Appearance; Craniotomy Incision @ Scalp open to air no measures needed,  Abdominal rash per report; personally did not see rash on pt, but applied antifungal cream      Continue POC

## 2024-11-18 NOTE — PLAN OF CARE
Discharge Plan: TCU     Precautions: fall, crani, helmet OOB. L neglect Do not leave alone EOB/commode.      Current Status:  Bed Mobility: Savanna L, mod-A R rolling, Max A supine<>sit  Transfer: Travis Ax2  Gait: unable, unsafe  Stairs: unable, unsafe  Balance: Able to sit unsupported SBA to min-A. Dynamic posterior/L lean, pt works to correct and is aware of midline. Static standing with RUE support with up to modA for steadying, does best with visual feedback from mirror to maintain midline.     Outcome Measures:   PASS               11/5: 0/36  Rice              11/5: 0/56     Assessment: Facilitated FES for sensory motor re-integration. Pt requires frequent cues for active pedaling, with cues pt demonstrates L LE activation.      Other Barriers to Discharge (DME, Family Training, etc):   1 LUPE (threshold) + 15STI to access bedroom/bathroom. Option for main floor living in [facility].      DME: pending progress    ----------------------------------------------------------------------------  Skilled set up on :  Pt dependent for proper placement of electrodes on L TA, gastroc, quads, hamstrings for optimum muscle contraction, safe positioning on w/c within frame and cushion for prevention of skin breakdown, feet secured to foot pedals, w/c secured to  w/ Q-straints.  Passive motion assessed to ensure proper positioning.      Pt performed 26.19 minutes of active FES ergometry with 100% stimulation applied to above muscles at 35 rpm with 1.04 Nm resistance.  This PT adjusted e-stim and cycling parameters in real-time to ensure palpable muscle contractions throughout session.      Changes in parameters that were part of this treatment:   -resistance  -pulse width, frequency  -amplitude  -pedal speed      Functional outcomes from this intervention include:   -reduced spasticity  -improved sensory awareness and proprioception  -improved muscle strength  -improved motor coordination    Session Summary:   -Average Power:  1.7  -Asymmetry: L 1%  -Active Minutes: 9 minutes

## 2024-11-18 NOTE — PROGRESS NOTES
CANDELARIO emailed [facility] admissions to check on referral. Pt was accepted, bed is available Friday 11/22 pending insurance authorization. CANDELARIO met with pt and his significant other to provide this update. CANDELARIO will assist with setting up transportation once admission is confirmed.     JUS P[facility]ano  Post Acute Float   ARU/TCU/LTACH    Phone: [phone]  Fax: [phone]

## 2024-11-18 NOTE — PLAN OF CARE
[id number]    Goal Outcome Evaluation:       Plan of Care Reviewed With: patient     Overall Patient Progress: no changeOverall Patient Progress: no change    Alert and orientedx4, A2 lift. Makes needs known.  Pt reported R shoulder pain at night, PRN tylenol given at HS.  Continent of bowel movement after supposity.  Primofit cath on at night.  No acute changes, continue with POC.

## 2024-11-18 NOTE — PROGRESS NOTES
[facility]   Acute Rehabilitation Unit  Daily Progress Note    INTERVAL HISTORY  No acute events overnight. Vitals stable. Nursing notes reviewed, no acute concerns. Patient reported sleeping ok. Appetite has been good. Pain has been tolerable in R shoulder, says it's sore. Would like to try capsaicin cream. Ordered. Had BM 11/18. Spouse at bedside. Feels therapies are going well. Patient denied any further questions/concerns at this time.    ROS: 10 point ROS was assessed and was negative unless otherwise stated in HPI    Functional  PT: 11/13  Bed Mobility: Mod-A L, Max-A R rolling and supine<>sit  Transfer: Liko Ax2  Gait: unable, unsafe  Stairs: unable, unsafe  Balance: Able to sit unsupported SBA to min-A. Dynamic posterior/L lean, pt works to correct and is aware of midline  Outcome Measures:   PASS  11/5: 0/36  Rice 11/5: 0/56     OT: 11/14  ADLs:  Mobility: A x 2 bed mobility. Lift transfer. Wheelchair based in Broda chair.   Grooming: Min A seated.  Dressing: UB A x 2 seated in chair. LB A x 2 supine.   Bathing: liko lift Ax2 to/from purple shower chair/commode. Assistance with washing/rinsing/drying 6/10 body parts.   Toileting: Liko lift Ax2 to/from purple dependent shower chair/commode. Dependent hygiene/clothing.  IADLs: PLOF IND. Recommend assist.  Vision/Cognition: Full time corrective lenses. L neglect, possible peripheral field deficits. Moderate cognitive deficits attention, recall, comprehension, problem solving.     SLP: 11/14  Hearing: WFL  Vision: glasses; significant visual impairment (baseline per patient) as well as left neglect/inattention  Communication: Mild dysarthria; speech intelligibility 80% in conversation  Cognition: Moderate cogntiive-communication impairment in areas of attention, memory, executive function and visuospatial skills.   Swallow: Regular diet and thin liquids (0). Set up assist as needed, upright position/chair position is  ideal, small bites/sips, slow rate, and alternate solids and liquids. ARU swallow eval only, pt independent with swallow strategies.    MEDICATIONS  Scheduled meds  Current Facility-Administered Medications   Medication Dose Route Frequency Provider Last Rate Last Admin    acetaminophen-caffeine (EXCEDRIN TENSION HEADACHE) 500-65 MG tablet 1 tablet  1 tablet Oral Daily Angel Vargas DO   1 tablet at 11/18/24 0737    amLODIPine (NORVASC) tablet 10 mg  10 mg Oral Daily Yessica Villalobos MD   10 mg at 11/18/24 0730    aspirin (ASA) chewable tablet 81 mg  81 mg Oral Daily Zac Hurley MD   81 mg at 11/18/24 0730    atorvastatin (LIPITOR) tablet 40 mg  40 mg Oral QPM Zac Hurley MD   40 mg at 11/17/24 2043    baclofen (LIORESAL) tablet 10 mg  10 mg Oral BID Zac Hurley MD   10 mg at 11/18/24 0834    bisacodyl (DULCOLAX) suppository 10 mg  10 mg Rectal Daily Angel Vargas DO   10 mg at 11/17/24 1839    clopidogrel (PLAVIX) tablet 75 mg  75 mg Oral Daily Zac Hurley MD   75 mg at 11/18/24 0731    clotrimazole (LOTRIMIN) 1 % cream   Topical BID Rosio Montague MD   Given at 11/18/24 0834    enoxaparin ANTICOAGULANT (LOVENOX) injection 40 mg  40 mg Subcutaneous Q24H Zac Hurley MD   40 mg at 11/17/24 1515    insulin aspart (NovoLOG) injection (RAPID ACTING)  1-3 Units Subcutaneous TID AC Zac Hurley MD   1 Units at 11/17/24 1719    insulin aspart (NovoLOG) injection (RAPID ACTING)  1-3 Units Subcutaneous At Bedtime Zac Hurley MD        insulin glargine (LANTUS PEN) injection 7 Units  7 Units Subcutaneous QAM Zac Ruby MD   7 Units at 11/18/24 0729    insulin glargine (LANTUS PEN) injection 7 Units  7 Units Subcutaneous At Bedtime Zac Hurley MD   7 Units at 11/17/24 2042    Lidocaine (LIDOCARE) 4 % Patch 1 patch  1 patch Transdermal Q24h Angel Vargas, DO   1 patch at 11/17/24 2043    losartan (COZAAR) tablet 50 mg  50 mg Oral BID Yessica Villalobos MD    "50 mg at 11/18/24 0731    melatonin tablet 5 mg  5 mg Oral At Bedtime Angel Vargas DO   5 mg at 11/17/24 2043    metoprolol tartrate (LOPRESSOR) tablet 50 mg  50 mg Oral BID Yessica Villalobos MD   50 mg at 11/18/24 0834    pantoprazole (PROTONIX) EC tablet 40 mg  40 mg Oral QAM AC Yessica Villalobos MD   40 mg at 11/18/24 0441    sennosides (SENOKOT) tablet 1 tablet  1 tablet Oral Daily Zac Hurley MD   1 tablet at 11/18/24 0730    thiamine (B-1) tablet 100 mg  100 mg Oral Daily Zac Hurley MD   100 mg at 11/18/24 0731       PRN meds:  Current Facility-Administered Medications   Medication Dose Route Frequency Provider Last Rate Last Admin    acetaminophen (TYLENOL) tablet 650 mg  650 mg Oral Q4H PRN Zac Hurley MD   650 mg at 11/17/24 2150    glucose gel 15-30 g  15-30 g Oral Q15 Min PRN Zac Hurley MD        Or    dextrose 50 % injection 25-50 mL  25-50 mL Intravenous Q15 Min PRN Zac Hurley MD        Or    glucagon injection 1 mg  1 mg Subcutaneous Q15 Min PRN Zac Hurley MD        diclofenac (VOLTAREN) 1 % topical gel 4 g  4 g Topical 4x Daily PRN Zac Hurley MD   4 g at 11/17/24 1540    famotidine (PEPCID) tablet 20 mg  20 mg Oral BID PRN Zac Hurley MD        menthol (Topical Analgesic) 2.5% (BENGAY VANISHING SCENT) 2.5 % topical gel   Topical 4x Daily PRN Yessica Villalobos MD   Given at 11/16/24 0842    polyethylene glycol (MIRALAX) Packet 17 g  17 g Oral Daily PRN Zac Hurley MD        QUEtiapine (SEROquel) quarter-tab 6.25 mg  6.25 mg Oral At Bedtime PRN Zac Hurley MD   6.25 mg at 11/17/24 2150         PHYSICAL EXAM  /75 (BP Location: Right arm)   Pulse 74   Temp 98  F (36.7  C) (Oral)   Resp 18   Ht 1.753 m (5' 9\")   Wt 97 kg (213 lb 13.5 oz)   SpO2 99%   BMI 31.58 kg/m    General: in no acute distress, conversational and alert, resting comfortably in bed  HEENT: Helmet not on in bed. Nares clear, conjunctiva white  Pulmonary: on room air, no " acute distress. CTAB.   Cardiovascular: Warm, well perfused  Abdominal: soft, non-tender to palpation, non-distended  Extremities: warm peripherally, no edema in BLEs  Skin: warm, dry, without erythema, ecchymosis. Some areas of dryness on abdomen and chest - clear outline, flakey, not itchy  Neuro: Conjugate gaze, left facial droop. 2/5 strength in left shoulder abduction. 2/5 strength in left EF. 0/5 strength in left elbow extension. 1/5 strength in knee extension. 0/5 strength in left DF/PF.      LABS  Results for orders placed or performed during the hospital encounter of 11/04/24 (from the past 24 hours)   Glucose by meter   Result Value Ref Range    GLUCOSE BY METER POCT 96 70 - 99 mg/dL   Glucose by meter   Result Value Ref Range    GLUCOSE BY METER POCT 149 (H) 70 - 99 mg/dL   Glucose by meter   Result Value Ref Range    GLUCOSE BY METER POCT 124 (H) 70 - 99 mg/dL   Basic metabolic panel   Result Value Ref Range    Sodium 140 135 - 145 mmol/L    Potassium 3.8 3.4 - 5.3 mmol/L    Chloride 106 98 - 107 mmol/L    Carbon Dioxide (CO2) 22 22 - 29 mmol/L    Anion Gap 12 7 - 15 mmol/L    Urea Nitrogen 7.5 6.0 - 20.0 mg/dL    Creatinine 0.78 0.67 - 1.17 mg/dL    GFR Estimate >90 >60 mL/min/1.73m2    Calcium 9.1 8.8 - 10.4 mg/dL    Glucose 100 (H) 70 - 99 mg/dL   CBC with platelets   Result Value Ref Range    WBC Count 4.9 4.0 - 11.0 10e3/uL    RBC Count 4.35 (L) 4.40 - 5.90 10e6/uL    Hemoglobin 11.3 (L) 13.3 - 17.7 g/dL    Hematocrit 34.7 (L) 40.0 - 53.0 %    MCV 80 78 - 100 fL    MCH 26.0 (L) 26.5 - 33.0 pg    MCHC 32.6 31.5 - 36.5 g/dL    RDW 16.7 (H) 10.0 - 15.0 %    Platelet Count 205 150 - 450 10e3/uL   Glucose by meter   Result Value Ref Range    GLUCOSE BY METER POCT 108 (H) 70 - 99 mg/dL       ASSESSMENT AND PLAN  Carl Sierra is a 49 year old left hand dominant male with past medical history of HTN and undiagnosed GLENN, who initially presented to [facility] 2 with hypertensive emergency, left upper extremity  weakness and numbness due to right MCA ischemic stroke. He underwent right craniectomy on October 5 for the management of cerebral edema and midline shift. Hospital course was complicated but he clinically improved and was transferred to ARU.     Updates Today:  - Bowel Program: Miralax decreased to QD PRN. Senna decreased to 1 tab QD. Will continue bisacodyl suppository qPM following dinner.     Admission to acute inpatient rehab: 11/4/2024   Impairment group code: 01.1 Stroke      PT, OT and SLP 60 minutes of each on a daily basis, 6x a week, for 25 days,  in addition to rehab nursing and close management of physiatrist.     Impairment of ADL's:  OT for 60 min daily, 6x a week for 25 days, to work on upper and lower body self care, dressing, toileting, bathing, energy conservation techniques with use of ADs as needed   Impairment of mobility:   PT for 60 min daily, 6x a week for 25 days, to work on strengthening, endurance buildup, transfers and most likely WC mobility.   Impairment of cognition/language/swallow:   SLP for 60 min daily, 6x a week  for cognitive evaluation and treatment strategies for higher level cognitive deficits and memory impairment   Rehab RN to administer medication, patient education on medication taking, VS monitoring, bowel regimen, glucose monitoring and wound care/surgical wound dressing changes and monitoring.      Medical Conditions  # Right MCA ischemic stroke s/p TNK (10/2/2024)  # Cerebral edema s/p decompressive craniectomy (10/5/24)  # Left Sided Hemiparesis, Flaccid  # Spasticity  Patient presented to [facility] on 10/2 with hypertensive emergency, left upper extremity numbness and weakness.  Stroke code was initiated on arrival and CTA head/neck revealed short segment severe stenosis of the posterior branch of the M2 segment of the right MCA. Decision was made to give tenecteplase.  This did not significantly improve his symptoms.  Brain MRI additionally revealed  small right posterior cerebellar stroke and multiple infarcts. Completed 7 day course of keppra. Patient had left sided flaccid hemiplegia on admission, with some return of strength since that time. He has developed some increased tone in the LUE/LLE.   - continue ASA and plavix  - continue acetaminophen-caffeine (Excedrin Tension Headache) 500-65 mg tablet daily (was started on 10/25) . Could consider modafinil if fatigue is a persistent issue.   - stroke secondary prevention as below  - helmet when OOB   - follow up with neurosurgery ([facility]) for bone flap replacement after discharge. Would be direct admission day prior for lumbar drain placement and bone flap replacement the following day.  - Outpatient stroke neurology follow up (6 weeks post discharge)  - Outpatient PM&R follow up (1-2 months post discharge).   - Baclofen 10 mg BID initiated on 11/14. Uptitrate as needed.       # Headaches  # Neck pain   # L knee pain   Headaches seem to be well controlled on tylenol PRN and excedrin. Typically starts towards the end of the day and also responded to lying flat and hydration. Pain seems myofascial vs AC joint related in nature with no clinical s/s of radiculopathy. L knee pain for 20 years due to sports injury and likely early OA.   - Discontinued robaxin to avoid concurrent dosing with baclofen.    - continue tylenol 650 mg Q4H PRN   - added topical bengay and diclofenac cream   - Gabapentin 300 mg at bedtime for headaches  - can consider TENS for the myofascial pain   - Capsaicin cream prn 11/18    # HTN  Not managed prior to admission . Has had intermittent hypertension while at ARU.   - continue metoprolol 50 bid   - continue losartan 50 bid (was dosed bid for better control of morning elevation in BP and can be changed to daily dosing)  - amlodipine 10 daily      # HLD  LDL 98 on 10/3.   - continue lipitor 40mg QD  - repeat lipid panel in 3 months      # T2DM  HgbA1c was 9.2 10/3  - DM  education   - continue lantus 7 units bid. Could consider transition to oral agent.   - continue low dose sliding scale insulin   - BG check QID  - repeat HgbA1C in 3 months      # NICM/HFrEF  TTE performed at OSH. EF 40 to 45% with global hypokinesis but no intracardiac shunt   - repeat TTE and cardiology referral (if outpatient) around 12/5.      # Possible undiagnosed GLENN   Family reported episodes of apnea during the night. Has not needed nocturnal O2 while in ARU.   - continue prn O2  - formal sleep study as outpatient        # Normocytic Anemia   Mild and stable, likely due to chronic disease vs CHAD.   - Will continue to monitor with qMon/Thurs CBC     # Insomnia   - continue melatonin   - Seroquel 6.25 mg PO HS PRN. Would discuss cessation with patient prior to discharge to TCU.       # Depression  # Adjustment to disability  Worsening mood in the setting of his stroke and disability.   - rehab psych consult   - spiritual health consult   - can consider selective serotonin reuptake inhibitor vs SNRI      # Neurogenic bowel  - Miralax one packet dailyPRN   - Senna 1 tab qAM  -  Bisacodyl 10mg suppository QD     # Neurogenic bladder:   - ok to use primofit  - will start using urinal during the day and possibly toileting using the commode pending his course       FEN: regular with thin. Continued B1 supplement.   DVT Prophylaxis: DAPT, lovenox 40 mg QD  GI Prophylaxis: Protonix 40 mg QD.  Code: Full - confirmed upon admission   Disposition: he will be WC based and will require assistance with all ADLs and mobility; home is not accessible. Will need as ramp and possibly chair lift for stairs.   ELOS/Discharge Date:  4 weeks and most likely TCU after that before discharge to home.  Rehab prognosis:  Good   Follow up Appointments on Discharge:   Outpatient PCP in 1-2 weeks.   Outpatient PM&R in 1-2 months.  Stroke neurology  (6-8 weeks post discharge)  Neurosurgery ([facility])  Heart Failure Clinic (With TTE  ordered prior)   Sleep medicine (if desired by patient)   Psychology     Patient reviewed with attending physician, Dr. Vargas, who agrees with the assessment and plan.    Dominga Kirk MD  [facility] PM&R PGY-2  11/18/2024  Pager #: [phone]

## 2024-11-18 NOTE — PLAN OF CARE
Discharge Planner Post-Acute Rehab SLP:      Discharge Plan: TCU. Ongoing SLP     Precautions: fall, crani/helmet, impulsivity     Current Status:  Hearing: WFL  Vision: glasses; significant visual impairment (baseline per patient) as well as left neglect/inattention  Communication: Mild dysarthria; speech intelligibility 80% in conversation  Cognition: Moderate cogntiive-communication impairment in areas of attention, memory, executive function and visuospatial skills.   Swallow: Regular diet and thin liquids (0). Set up assist as needed, upright position/chair position is ideal, small bites/sips, slow rate, and alternate solids and liquids. ARU swallow eval only, pt independent with swallow strategies.     Assessment: AM Session: Reviewed word seach left as homework from prior session. Pt reporting it was a challenging activity. Able to idntify 10 words primarily in R field of vision. Did not use red line for activity. Engaged in deductive reasoning puzzle. Requiring mild verbal cueing to look all the way to L side. Able to complete with 100% accuracy. Word search with added L visual anchor left as homework to be checked at future session.     PM Session: Followed up word search left from prior session. With implementation of L visual anchor, able to identify words in L field of vision. reported that anchor made it easier to carry out task. Pt requested to keep word search to complete further. Introduced novel card game, speed. Overall required moderate levels of verbal cueing to check for errors, but able to correct when brought to attention. Required consistent verbal cueing to draw cards from pile in L field of vision, benefitted from red anchor on this side. Pt accuracy and processing speed improved as game progressed.     Other Barriers to Discharge (Family Training, etc): none if discharging to TCU.

## 2024-11-18 NOTE — PROGRESS NOTES
Discharge Planner Post-Acute Rehab OT:      Discharge Plan: TCU     Precautions: Falls, Craniectomy - helmet OOB, LUE/LLE hemiparesis, L neglect, do not leave alone at EOB/commode  *Safe sitting plan     Current Status:  ADLs:  Mobility: Ax2 with meile steady. Wheelchair based in TIS W/C   Grooming: Min A seated.  Dressing: UB Max A seated in chair. LB A x 2 supine.   Bathing: liko lift Ax2 to/from purple shower chair/commode. Assistance with washing/rinsing/drying 6/10 body parts.   Toileting: Emile steady Ax2 with toilet transfer with commode overlay. Dependent hygiene/clothing.  IADLs: PLOF IND. Recommend assist.  Vision/Cognition: Full time corrective lenses. L neglect, possible peripheral field deficits. Moderate cognitive deficits attention, recall, comprehension, problem solving.      Assessment: Utilized Xcite for postural control to promote midline posture; refer to care plan note for further details.  Progressed pt to Ax2 with emile steady with nsg staff. Provided pt with L prasad neurexa plus to wear with transfers/mobility.      Other Barriers to Discharge (DME, Family Training, etc):   Home set up: 1 LUPE. 16 stairs to access bedroom/bathroom. Can stay on main level with bedroom/bathroom but no shower.   Caregiver support: Partner working remotely, involved and present on unit.   Equipment needs pending progress.

## 2024-11-18 NOTE — PLAN OF CARE
Goal Outcome Evaluation:      Plan of Care Reviewed With: patient    Overall Patient Progress: no change    Patient is alert and oriented, calls to make needs known. Appeared sleeping on safety checks. No pain complaints. No PRN given/requested. Turned and repositioned. Has Primofit in place but had leaked. Had a BM today and requested to use the bed pan. No acute issues. Comfortable in bed. Fall precautions maintained, call light in reach, safety checks completed.    Continue with POC.

## 2024-11-19 ENCOUNTER — APPOINTMENT (OUTPATIENT)
Dept: OCCUPATIONAL THERAPY | Facility: CLINIC | Age: 49
DRG: 057 | End: 2024-11-19
Attending: PHYSICAL MEDICINE & REHABILITATION
Payer: COMMERCIAL

## 2024-11-19 ENCOUNTER — APPOINTMENT (OUTPATIENT)
Dept: PHYSICAL THERAPY | Facility: CLINIC | Age: 49
DRG: 057 | End: 2024-11-19
Attending: PHYSICAL MEDICINE & REHABILITATION
Payer: COMMERCIAL

## 2024-11-19 ENCOUNTER — APPOINTMENT (OUTPATIENT)
Dept: SPEECH THERAPY | Facility: CLINIC | Age: 49
DRG: 057 | End: 2024-11-19
Attending: PHYSICAL MEDICINE & REHABILITATION
Payer: COMMERCIAL

## 2024-11-19 LAB
GLUCOSE BLDC GLUCOMTR-MCNC: 100 MG/DL (ref 70–99)
GLUCOSE BLDC GLUCOMTR-MCNC: 108 MG/DL (ref 70–99)
GLUCOSE BLDC GLUCOMTR-MCNC: 129 MG/DL (ref 70–99)
GLUCOSE BLDC GLUCOMTR-MCNC: 131 MG/DL (ref 70–99)

## 2024-11-19 PROCEDURE — 128N000003 HC R&B REHAB

## 2024-11-19 PROCEDURE — 97530 THERAPEUTIC ACTIVITIES: CPT | Mod: GP

## 2024-11-19 PROCEDURE — 250N000013 HC RX MED GY IP 250 OP 250 PS 637

## 2024-11-19 PROCEDURE — 250N000013 HC RX MED GY IP 250 OP 250 PS 637: Performed by: PHYSICAL MEDICINE & REHABILITATION

## 2024-11-19 PROCEDURE — 250N000011 HC RX IP 250 OP 636

## 2024-11-19 PROCEDURE — 97112 NEUROMUSCULAR REEDUCATION: CPT | Mod: GO | Performed by: OCCUPATIONAL THERAPIST

## 2024-11-19 PROCEDURE — 99231 SBSQ HOSP IP/OBS SF/LOW 25: CPT | Mod: GC | Performed by: PHYSICAL MEDICINE & REHABILITATION

## 2024-11-19 PROCEDURE — 97130 THER IVNTJ EA ADDL 15 MIN: CPT | Mod: GN

## 2024-11-19 PROCEDURE — 97129 THER IVNTJ 1ST 15 MIN: CPT | Mod: GN

## 2024-11-19 RX ADMIN — LOSARTAN POTASSIUM 50 MG: 50 TABLET, FILM COATED ORAL at 20:18

## 2024-11-19 RX ADMIN — INSULIN GLARGINE 7 UNITS: 100 INJECTION, SOLUTION SUBCUTANEOUS at 20:19

## 2024-11-19 RX ADMIN — ATORVASTATIN CALCIUM 40 MG: 40 TABLET, FILM COATED ORAL at 20:18

## 2024-11-19 RX ADMIN — CLOPIDOGREL BISULFATE 75 MG: 75 TABLET ORAL at 07:57

## 2024-11-19 RX ADMIN — PANTOPRAZOLE SODIUM 40 MG: 40 TABLET, DELAYED RELEASE ORAL at 06:43

## 2024-11-19 RX ADMIN — LIDOCAINE 1 PATCH: 4 PATCH TOPICAL at 20:18

## 2024-11-19 RX ADMIN — BISACODYL 10 MG: 10 SUPPOSITORY RECTAL at 18:22

## 2024-11-19 RX ADMIN — SENNOSIDES 1 TABLET: 8.6 TABLET, FILM COATED ORAL at 07:57

## 2024-11-19 RX ADMIN — BACLOFEN 10 MG: 10 TABLET ORAL at 07:56

## 2024-11-19 RX ADMIN — ENOXAPARIN SODIUM 40 MG: 40 INJECTION SUBCUTANEOUS at 15:32

## 2024-11-19 RX ADMIN — Medication 6.25 MG: at 22:03

## 2024-11-19 RX ADMIN — METOPROLOL TARTRATE 50 MG: 50 TABLET, FILM COATED ORAL at 20:18

## 2024-11-19 RX ADMIN — THIAMINE HCL TAB 100 MG 100 MG: 100 TAB at 07:57

## 2024-11-19 RX ADMIN — AMLODIPINE BESYLATE 10 MG: 10 TABLET ORAL at 07:57

## 2024-11-19 RX ADMIN — Medication 5 MG: at 20:18

## 2024-11-19 RX ADMIN — ASPIRIN 81 MG CHEWABLE TABLET 81 MG: 81 TABLET CHEWABLE at 07:56

## 2024-11-19 RX ADMIN — CLOTRIMAZOLE: 1 CREAM TOPICAL at 20:19

## 2024-11-19 RX ADMIN — ACETAMINOPHEN, CAFFEINE 1 TABLET: 500; 65 TABLET, FILM COATED ORAL at 07:56

## 2024-11-19 RX ADMIN — LOSARTAN POTASSIUM 50 MG: 50 TABLET, FILM COATED ORAL at 07:57

## 2024-11-19 RX ADMIN — INSULIN GLARGINE 7 UNITS: 100 INJECTION, SOLUTION SUBCUTANEOUS at 07:55

## 2024-11-19 RX ADMIN — BACLOFEN 10 MG: 10 TABLET ORAL at 20:18

## 2024-11-19 RX ADMIN — METOPROLOL TARTRATE 50 MG: 50 TABLET, FILM COATED ORAL at 07:56

## 2024-11-19 NOTE — PROGRESS NOTES
On track for a discharge to [facility] on Friday. [facility] let SW know pt's room would be shared, SW updated pt and his family. Pt's family says a shared room would not work, they are concerned with pt's mental health if he had to share a room based on his experience at [facility]. CANDELARIO emailed [facility].    [phone]    [facility]  Post Acute Float   ARU/[facility]    Phone: [phone]  Fax: [phone]

## 2024-11-19 NOTE — PLAN OF CARE
Goal Outcome Evaluation:      Plan of Care Reviewed With: patient    Overall Patient Progress: no changeOverall Patient Progress: no change Patient is alert and oriented x 4 can be forgetful. Upgraded to A-2 emile manzano but there is a concern during transfer this evening due to patient leaning to left side with left upper side flaccid. Patient is on bowel program. Incontinent of bladder uses primo fit at night. Last BM 11/18. VS and BG monitored. No care concern at this time. Call light is with in reach alarm is on.

## 2024-11-19 NOTE — PROGRESS NOTES
[facility]   Acute Rehabilitation Unit  Daily Progress Note    INTERVAL HISTORY  No acute events overnight. Vitals stable. Nursing notes reviewed, no acute concerns. Reports sleeping ok. Could not try capsaicin cream until this morning, liked that it felt warm. Will continue to try using it. Had a BM w the suppository yesterday. No pain with urination. No SOB, chest pain. Continues to have stable neck pain. Encouraging PO intake. Plan to discharge to [facility] Friday 11/22. Patient denied any further questions/concerns at this time.      Functional  PT: 11/19  Bed Mobility: Savanna L, mod-A R rolling, Max A supine<>sit  Transfer: Ax2 sarastedy w/ nsg.   Gait: unable, unsafe  Stairs: unable, unsafe  Balance: Able to sit unsupported SBA to min-A. Dynamic posterior/L lean, pt works to correct and is aware of midline. Static standing with RUE support with up to modA for steadying, does best with visual feedback from mirror to maintain midline.     OT: 11/19  ADLs:  Mobility: Ax2 with emile steady. Wheelchair based.   Grooming: Min A seated.  Dressing: UB Max A seated in chair. LB A x 2 supine.   Bathing: liko lift Ax2 to/from purple shower chair/commode. Assistance with washing/rinsing/drying 6/10 body parts.   Toileting: Emile steady Ax2 with toilet transfer with commode overlay. Dependent hygiene/clothing.  IADLs: PLOF IND. Recommend assist.  Vision/Cognition: Full time corrective lenses. L neglect, possible peripheral field deficits. Moderate cognitive deficits attention, recall, comprehension, problem solving.     SLP: 11/18  Hearing: WFL  Vision: glasses; significant visual impairment (baseline per patient) as well as left neglect/inattention  Communication: Mild dysarthria; speech intelligibility 80% in conversation  Cognition: Moderate cogntiive-communication impairment in areas of attention, memory, executive function and visuospatial skills.   Swallow: Regular diet and thin  Outpatient Urology Follow up Note    Luke returns following circumcision performed yesterday.  His dressing was removed and he was given postoperative instructions.  Surgical site appears as expected without issues.   liquids (0). Set up assist as needed, upright position/chair position is ideal, small bites/sips, slow rate, and alternate solids and liquids. ARU swallow eval only, pt independent with swallow strategies.    MEDICATIONS  Scheduled meds  Current Facility-Administered Medications   Medication Dose Route Frequency Provider Last Rate Last Admin    acetaminophen-caffeine (EXCEDRIN TENSION HEADACHE) 500-65 MG tablet 1 tablet  1 tablet Oral Daily Angel Vargas DO   1 tablet at 11/19/24 0756    amLODIPine (NORVASC) tablet 10 mg  10 mg Oral Daily Yessica Villalobos MD   10 mg at 11/19/24 0757    aspirin (ASA) chewable tablet 81 mg  81 mg Oral Daily Zac Hurley MD   81 mg at 11/19/24 0756    atorvastatin (LIPITOR) tablet 40 mg  40 mg Oral QPM Zac Hurley MD   40 mg at 11/18/24 1945    baclofen (LIORESAL) tablet 10 mg  10 mg Oral BID Zac Hurley MD   10 mg at 11/19/24 0756    bisacodyl (DULCOLAX) suppository 10 mg  10 mg Rectal Daily Angel Vargas DO   10 mg at 11/18/24 1948    clopidogrel (PLAVIX) tablet 75 mg  75 mg Oral Daily Zac Hurley MD   75 mg at 11/19/24 0757    clotrimazole (LOTRIMIN) 1 % cream   Topical BID Rosio Montague MD   Given at 11/18/24 1952    enoxaparin ANTICOAGULANT (LOVENOX) injection 40 mg  40 mg Subcutaneous Q24H Zac Hurley MD   40 mg at 11/18/24 1417    insulin aspart (NovoLOG) injection (RAPID ACTING)  1-3 Units Subcutaneous TID Zac Ruby MD   1 Units at 11/17/24 1719    insulin aspart (NovoLOG) injection (RAPID ACTING)  1-3 Units Subcutaneous At Bedtime Zac Hurley MD        insulin glargine (LANTUS PEN) injection 7 Units  7 Units Subcutaneous QAM Zac Ruby MD   7 Units at 11/19/24 0755    insulin glargine (LANTUS PEN) injection 7 Units  7 Units Subcutaneous At Bedtime Zac Hurley MD   7 Units at 11/18/24 1949    Lidocaine (LIDOCARE) 4 % Patch 1 patch  1 patch Transdermal Q24h Angel Vargas, DO   1 patch at 11/18/24  "1958    losartan (COZAAR) tablet 50 mg  50 mg Oral BID Yessica Villalobos MD   50 mg at 11/19/24 0757    melatonin tablet 5 mg  5 mg Oral At Bedtime Angel Vargas DO   5 mg at 11/18/24 1945    metoprolol tartrate (LOPRESSOR) tablet 50 mg  50 mg Oral BID Yessica Villalobos MD   50 mg at 11/19/24 0756    pantoprazole (PROTONIX) EC tablet 40 mg  40 mg Oral QAM AC Yessica Villalobos MD   40 mg at 11/19/24 0643    sennosides (SENOKOT) tablet 1 tablet  1 tablet Oral Daily Zac Hurley MD   1 tablet at 11/19/24 0757    thiamine (B-1) tablet 100 mg  100 mg Oral Daily Zac Hurley MD   100 mg at 11/19/24 0757       PRN meds:  Current Facility-Administered Medications   Medication Dose Route Frequency Provider Last Rate Last Admin    acetaminophen (TYLENOL) tablet 650 mg  650 mg Oral Q4H PRN Zac Hurley MD   650 mg at 11/18/24 2143    capsaicin (ZOSTRIX) 0.075 % cream   Topical TID PRN Angel Vargas DO        glucose gel 15-30 g  15-30 g Oral Q15 Min PRN Zac Hurley MD        Or    dextrose 50 % injection 25-50 mL  25-50 mL Intravenous Q15 Min PRN Zac Hurley MD        Or    glucagon injection 1 mg  1 mg Subcutaneous Q15 Min PRN Zac Hurley MD        diclofenac (VOLTAREN) 1 % topical gel 4 g  4 g Topical 4x Daily PRN Zac Hurley MD   4 g at 11/17/24 1540    famotidine (PEPCID) tablet 20 mg  20 mg Oral BID PRN Zac Hurley MD        menthol (Topical Analgesic) 2.5% (BENGAY VANISHING SCENT) 2.5 % topical gel   Topical 4x Daily PRN Yessica Villalobos MD   Given at 11/16/24 0842    polyethylene glycol (MIRALAX) Packet 17 g  17 g Oral Daily PRN Zac Hurley MD        QUEtiapine (SEROquel) quarter-tab 6.25 mg  6.25 mg Oral At Bedtime PRN Zac Hurley, MD   6.25 mg at 11/18/24 2205         PHYSICAL EXAM  /75 (BP Location: Right arm, Patient Position: Semi-Vargas's, Cuff Size: Adult Large)   Pulse 78   Temp 98.7  F (37.1  C) (Oral)   Resp 16   Ht 1.753 m (5' 9\")   Wt 97 kg (213 " lb 13.5 oz)   SpO2 98%   BMI 31.58 kg/m    General: in no acute distress, conversational and alert, resting comfortably in chair  HEENT: Helmet on. Nares clear, conjunctiva white  Pulmonary: on room air, no acute distress. CTAB.   Cardiovascular: Warm, well perfused  Abdominal: soft, non-tender to palpation, non-distended  Extremities: warm peripherally, no edema in BLEs  Skin: warm, dry, without erythema, ecchymosis. Some areas of dryness on abdomen and chest - clear outline, flakey, not itchy  Neuro: Conjugate gaze, left facial droop. Formal strength exam not completed today.     LABS  Results for orders placed or performed during the hospital encounter of 11/04/24 (from the past 24 hours)   Glucose by meter   Result Value Ref Range    GLUCOSE BY METER POCT 119 (H) 70 - 99 mg/dL   Glucose by meter   Result Value Ref Range    GLUCOSE BY METER POCT 122 (H) 70 - 99 mg/dL   Glucose by meter   Result Value Ref Range    GLUCOSE BY METER POCT 129 (H) 70 - 99 mg/dL   Glucose by meter   Result Value Ref Range    GLUCOSE BY METER POCT 100 (H) 70 - 99 mg/dL       ASSESSMENT AND PLAN  Carl Sierra is a 49 year old left hand dominant male with past medical history of HTN and undiagnosed GLENN, who initially presented to October 2 with hypertensive emergency, left upper extremity weakness and numbness due to right MCA ischemic stroke. He underwent right craniectomy on October 5 for the management of cerebral edema and midline shift. Hospital course was complicated but he clinically improved and was transferred to ARU.     ===========================================================    Updates Today:  - Patient had a bowel movement 11/18  - Pain tolerable, trying capsaicin cream  - No major medical changes made today    ============================================================    Admission to acute inpatient rehab: 11/4/2024   Impairment group code: 01.1 Stroke      PT, OT and SLP 60 minutes of each on a daily basis, 6x a week,  for 25 days,  in addition to rehab nursing and close management of physiatrist.     Impairment of ADL's:  OT for 60 min daily, 6x a week for 25 days, to work on upper and lower body self care, dressing, toileting, bathing, energy conservation techniques with use of ADs as needed   Impairment of mobility:   PT for 60 min daily, 6x a week for 25 days, to work on strengthening, endurance buildup, transfers and most likely WC mobility.   Impairment of cognition/language/swallow:   SLP for 60 min daily, 6x a week  for cognitive evaluation and treatment strategies for higher level cognitive deficits and memory impairment   Rehab RN to administer medication, patient education on medication taking, VS monitoring, bowel regimen, glucose monitoring and wound care/surgical wound dressing changes and monitoring.      Medical Conditions  # Right MCA ischemic stroke s/p TNK (10/2/2024)  # Cerebral edema s/p decompressive craniectomy (10/5/24)  # Left Sided Hemiparesis, Flaccid  # Spasticity  Patient presented to [facility] [place] on 10/2 with hypertensive emergency, left upper extremity numbness and weakness.  Stroke code was initiated on arrival and CTA head/neck revealed short segment severe stenosis of the posterior branch of the M2 segment of the right MCA. Decision was made to give tenecteplase.  This did not significantly improve his symptoms.  Brain MRI additionally revealed small right posterior cerebellar stroke and multiple infarcts. Completed 7 day course of keppra. Patient had left sided flaccid hemiplegia on admission, with some return of strength since that time. He has developed some increased tone in the LUE/LLE.   - ASA and plavix  - Acetaminophen-caffeine (Excedrin Tension Headache) 500-65 mg tablet daily (was started on 10/25) . Could consider modafinil if fatigue is a persistent issue.   - stroke secondary prevention as below  - helmet when OOB   - F/up Neuro (6 weeks post discharge), PM&R (1-2 months  post discharge), neurosurgery ([facility]) for bone flap replacement after discharge.   - Baclofen 10 mg BID 11/14      # Headaches  # Neck pain   # L knee pain   Headaches controlled w tylenol PRN, excedrin. Typically starts towards the end of the day and also responded to lying flat and hydration. Pain seems myofascial vs AC joint related, no clinical s/s of radiculopathy. L knee pain x20 years d/t sports injury + likely early OA.   - Discontinued robaxin to avoid concurrent dosing with baclofen.    - tylenol 650 mg Q4H PRN   - added topical bengay and diclofenac cream   - Gabapentin 300 mg at bedtime for headaches  - can consider TENS for the myofascial pain   - Capsaicin cream prn 11/18    # HTN  Not managed PTA. Intermittent HTN while at ARU.   - metoprolol 50 bid   - losartan 50 bid (was bid for better control of morning HTN, can be changed to daily dosing)  - amlodipine 10 daily      # HLD  LDL 98 on 10/3.   - continue lipitor 40mg QD  - repeat lipid panel in 3 months      # T2DM  HgbA1c 9.2 10/3.  - DM education   - Lantus 7 units bid, SSI. Could consider transition to oral agent.   - BG check QID  - f/up PCP     # NICM/HFrEF  TTE performed at OSH. EF 40 to 45% with global hypokinesis but no intracardiac shunt   - repeat TTE and cardiology referral (if outpatient) around 12/5.      # Possible undiagnosed GLENN   Family reported episodes of apnea during the night. Has not needed nocturnal O2 while in ARU.   - continue prn O2  - Sleep study as outpatient        # Normocytic Anemia   Mild and stable, likely due to chronic disease vs CHAD.   - qMon/Thurs CBC     # Insomnia   - continue melatonin   - Seroquel 6.25 mg PO HS PRN      # Depression  # Adjustment to disability  Worsening mood in the setting of his stroke and disability.   - rehab psych consult   - spiritual health consult   - can consider SSR I vs SNRI      # Neurogenic bowel  - Miralax one packet dailyPRN   - Senna 1 tab qAM  - Bisacodyl  10mg suppository QD     # Neurogenic bladder:   - ok to use primofit  - Urinal during the day, possibly toileting using the commode pending his course       FEN: regular with thin. Continued B1 supplement.   DVT Prophylaxis: DAPT, lovenox 40 mg QD  GI Prophylaxis: Protonix 40 mg QD.  Code: Full - confirmed upon admission  Disposition: he will be WC based and will require assistance with all ADLs and mobility; home is not accessible. Will need as ramp and possibly chair lift for stairs.   ELOS/Discharge Date:  11/22, Tameka Ríos  Rehab prognosis:  Good   Follow up Appointments on Discharge:   PCP in 1-2 weeks, needs sleep study  PM&R in 1-2 months.  Stroke neurology (6-8 weeks post discharge)  Neurosurgery ([facility])  Heart Failure Clinic (With TTE ordered prior)   Psychology     Patient reviewed with attending physician, Dr. Vargas, who agrees with the assessment and plan.    Dominga Kirk MD  [facility] PM&R PGY-2  11/19/2024  Pager #: [phone]

## 2024-11-19 NOTE — PLAN OF CARE
Discharge Plan: TCU      Precautions: fall, crani, helmet OOB. L neglect Do not leave alone EOB/commode.      Current Status: *Spouse okay to provide second assist with transfers.  Bed Mobility: Savanna L, mod-A R rolling, Max A supine<>sit  Transfer: Ax2 sarastedy w/ nsg.   Gait: unable, unsafe  Stairs: unable, unsafe  Balance: Able to sit unsupported SBA to min-A. Dynamic posterior/L lean, pt works to correct and is aware of midline. Static standing with RUE support with up to modA for steadying, does best with visual feedback from mirror to maintain midline.     Outcome Measures:   PASS               11/5: 0/36 11/9: 5/36  [place]              11/5: 0/56   11/15: 2/56     Assessment: Ok to continue sarastedy Ax2 w/ nsg, see updated order. With standing trials, does best w/ R anchored standing w/ high table for RUE support.      Other Barriers to Discharge (DME, Family Training, etc):   1 LUPE (threshold) + 15STI to access bedroom/bathroom. Option for main floor living in [facility].      DME: pending progress    Postural Assessment for Stroke Scale (PASS)    Maintaining posture -   1) Sitting without support - 2  2) Standing with support (feet position free) - 0 maxA  3) Standing without support (feet position free) - 0  4) Standing on nonparetic leg - 0  5) Standing on paretic leg - 0    Changing posture -  6) Rolling supine -> affected side - 1   7) Rolling supine -> unaffected side - 2  8) Sup->sitting edge of bed - 0 max  9) Sitting edge of bed -> sup - 0 max  10) Sit->stand without support - 0  11) Stand->sit without support - 0  12) Standing,  pencil from floor without support - 0    Total Score - 5/36    The PASS assesses a patient's ability to change and maintain posture during different balance activities. It is not associated with falls risk, but is used to track progress with the patient's ability to control their posture and balance throughout the course of the patient's admission.    A score of <22 points  at admission to Acute Rehab is predictive that pt is not likely to be walking independently at 3 months.   (Jono, et al. 2021. Postural Maintenance Is Associated with Walking Ability in People Received Acute Rehabilitation after Stroke)

## 2024-11-19 NOTE — PLAN OF CARE
FOCUS/GOAL  Bowel management, Bladder management, Pain management, and Prevention of secondary complications    ASSESSMENT, INTERVENTIONS AND CONTINUING PLAN FOR GOAL:  Pt transfers assist of 2 with liko lift.  On bowel program, suppository given in the evening and BM produced.  Mixed continence of both bowel/bladder.  LBM 11/18.  Has primo fit on tonight.  Uses call light appropriately.  Has lidocaine patch on right shoulder.  Requested PRN cream, request sent to pharmacy to be tubed up (passing to night shift, still has not came).  PRN tylenol and seroquel given at bedtime.  Regular thin diet, takes pills whole with water.  BG checks BID, 129 at bedtime. Pleasant, alert and oriented x4.  Goal Outcome Evaluation:

## 2024-11-19 NOTE — PLAN OF CARE
Goal Outcome Evaluation:    Pt AxO, able to make needs known. VSS, pain managed by scheduled meds. On regular texture, thin liquid diet. Able to take his meds whole with thin liquids. A2 with the emile manzano. Spouse at bedside through the shift. Cleared to be 2nd assist with transfers via emile manzano. Continent of bowel and bladder, able to use the urinal. No BM this shift. Participated in therapies. Care plan in place.  Bowel program implemented this afternoon, still awaiting results. Endorsed accordingly to incoming NOD.

## 2024-11-19 NOTE — PROGRESS NOTES
"Discharge Planner Post-Acute Rehab OT:      Discharge Plan: TCU     Precautions: Falls, Craniectomy - helmet OOB, LUE/LLE hemiparesis, L neglect, do not leave alone at EOB/commode  *Safe sitting plan     Current Status:  ADLs:  Mobility: Ax2 with emile steady. Wheelchair based.   Grooming: Min A seated.  Dressing: UB Max A seated in chair. LB A x 2 supine.   Bathing: liko lift Ax2 to/from purple shower chair/commode. Assistance with washing/rinsing/drying 6/10 body parts.   Toileting: Emile steady Ax2 with toilet transfer with commode overlay. Dependent hygiene/clothing.  IADLs: PLOF IND. Recommend assist.  Vision/Cognition: Full time corrective lenses. L neglect, possible peripheral field deficits. Moderate cognitive deficits attention, recall, comprehension, problem solving.      Assessment: Utilized FES for LUE neuro re-education and sensorimotor skills; refer care plan note for further details.   Provided pt 18\" manual W/C with specialty back with lateral supports to promote increased IND with W/C mobility, and improve sitting posture. Pt tolerating well.      Other Barriers to Discharge (DME, Family Training, etc):   Home set up: 1 LUPE. 16 stairs to access bedroom/bathroom. Can stay on main level with bedroom/bathroom but no shower.   Caregiver support: Partner working remotely, involved and present on unit.   Equipment needs pending progress.   "

## 2024-11-19 NOTE — PLAN OF CARE
Discharge Planner Post-Acute Rehab SLP:      Discharge Plan: TCU. Ongoing SLP     Precautions: fall, crani/helmet, impulsivity     Current Status:  Hearing: WFL  Vision: glasses; significant visual impairment (baseline per patient) as well as left neglect/inattention  Communication: Mild dysarthria; speech intelligibility 80% in conversation  Cognition: Moderate cogntiive-communication impairment in areas of attention, memory, executive function and visuospatial skills.   Swallow: Regular diet and thin liquids (0). Set up assist as needed, upright position/chair position is ideal, small bites/sips, slow rate, and alternate solids and liquids. ARU swallow eval only, pt independent with swallow strategies.     Assessment: Pt engaging in word search upon arrival. Requested to continue activity. Practiced L scanning with intermittent usage of red visual anchor. Requiring consistent verbal cues in place of red line. Required increased time to scan and find words. Ultimately able to find words in L field of vision.      Other Barriers to Discharge (Family Training, etc): none if discharging to TCU.

## 2024-11-20 ENCOUNTER — APPOINTMENT (OUTPATIENT)
Dept: PHYSICAL THERAPY | Facility: CLINIC | Age: 49
DRG: 057 | End: 2024-11-20
Attending: PHYSICAL MEDICINE & REHABILITATION
Payer: COMMERCIAL

## 2024-11-20 ENCOUNTER — APPOINTMENT (OUTPATIENT)
Dept: OCCUPATIONAL THERAPY | Facility: CLINIC | Age: 49
DRG: 057 | End: 2024-11-20
Attending: PHYSICAL MEDICINE & REHABILITATION
Payer: COMMERCIAL

## 2024-11-20 ENCOUNTER — TELEPHONE (OUTPATIENT)
Dept: FAMILY MEDICINE | Facility: CLINIC | Age: 49
End: 2024-11-20
Payer: COMMERCIAL

## 2024-11-20 ENCOUNTER — APPOINTMENT (OUTPATIENT)
Dept: SPEECH THERAPY | Facility: CLINIC | Age: 49
DRG: 057 | End: 2024-11-20
Attending: PHYSICAL MEDICINE & REHABILITATION
Payer: COMMERCIAL

## 2024-11-20 LAB
GLUCOSE BLDC GLUCOMTR-MCNC: 126 MG/DL (ref 70–99)
GLUCOSE BLDC GLUCOMTR-MCNC: 131 MG/DL (ref 70–99)
GLUCOSE BLDC GLUCOMTR-MCNC: 135 MG/DL (ref 70–99)
GLUCOSE BLDC GLUCOMTR-MCNC: 87 MG/DL (ref 70–99)

## 2024-11-20 PROCEDURE — 250N000013 HC RX MED GY IP 250 OP 250 PS 637: Performed by: PHYSICAL MEDICINE & REHABILITATION

## 2024-11-20 PROCEDURE — 97112 NEUROMUSCULAR REEDUCATION: CPT | Mod: GP

## 2024-11-20 PROCEDURE — 97150 GROUP THERAPEUTIC PROCEDURES: CPT | Mod: GN

## 2024-11-20 PROCEDURE — 97112 NEUROMUSCULAR REEDUCATION: CPT | Mod: GO | Performed by: OCCUPATIONAL THERAPIST

## 2024-11-20 PROCEDURE — 97535 SELF CARE MNGMENT TRAINING: CPT | Mod: GO | Performed by: OCCUPATIONAL THERAPIST

## 2024-11-20 PROCEDURE — 97130 THER IVNTJ EA ADDL 15 MIN: CPT | Mod: GN

## 2024-11-20 PROCEDURE — 250N000013 HC RX MED GY IP 250 OP 250 PS 637

## 2024-11-20 PROCEDURE — 250N000011 HC RX IP 250 OP 636

## 2024-11-20 PROCEDURE — 128N000003 HC R&B REHAB

## 2024-11-20 PROCEDURE — 97129 THER IVNTJ 1ST 15 MIN: CPT | Mod: GN

## 2024-11-20 PROCEDURE — 99231 SBSQ HOSP IP/OBS SF/LOW 25: CPT | Mod: GC | Performed by: PHYSICAL MEDICINE & REHABILITATION

## 2024-11-20 RX ORDER — CAPSAICIN 0.75 MG/G
CREAM TOPICAL 3 TIMES DAILY
Status: DISCONTINUED | OUTPATIENT
Start: 2024-11-20 | End: 2024-11-22 | Stop reason: HOSPADM

## 2024-11-20 RX ORDER — BACLOFEN 10 MG/1
10 TABLET ORAL 2 TIMES DAILY
Status: DISCONTINUED | OUTPATIENT
Start: 2024-11-20 | End: 2024-11-22 | Stop reason: HOSPADM

## 2024-11-20 RX ADMIN — PANTOPRAZOLE SODIUM 40 MG: 40 TABLET, DELAYED RELEASE ORAL at 06:35

## 2024-11-20 RX ADMIN — CLOTRIMAZOLE: 1 CREAM TOPICAL at 08:50

## 2024-11-20 RX ADMIN — ENOXAPARIN SODIUM 40 MG: 40 INJECTION SUBCUTANEOUS at 13:18

## 2024-11-20 RX ADMIN — CLOTRIMAZOLE: 1 CREAM TOPICAL at 20:56

## 2024-11-20 RX ADMIN — METOPROLOL TARTRATE 50 MG: 50 TABLET, FILM COATED ORAL at 08:40

## 2024-11-20 RX ADMIN — BACLOFEN 10 MG: 10 TABLET ORAL at 20:52

## 2024-11-20 RX ADMIN — ASPIRIN 81 MG CHEWABLE TABLET 81 MG: 81 TABLET CHEWABLE at 08:40

## 2024-11-20 RX ADMIN — BISACODYL 10 MG: 10 SUPPOSITORY RECTAL at 19:02

## 2024-11-20 RX ADMIN — LOSARTAN POTASSIUM 50 MG: 50 TABLET, FILM COATED ORAL at 08:40

## 2024-11-20 RX ADMIN — CLOPIDOGREL BISULFATE 75 MG: 75 TABLET ORAL at 08:40

## 2024-11-20 RX ADMIN — LOSARTAN POTASSIUM 50 MG: 50 TABLET, FILM COATED ORAL at 20:52

## 2024-11-20 RX ADMIN — INSULIN GLARGINE 7 UNITS: 100 INJECTION, SOLUTION SUBCUTANEOUS at 08:48

## 2024-11-20 RX ADMIN — THIAMINE HCL TAB 100 MG 100 MG: 100 TAB at 08:40

## 2024-11-20 RX ADMIN — ATORVASTATIN CALCIUM 40 MG: 40 TABLET, FILM COATED ORAL at 20:52

## 2024-11-20 RX ADMIN — BACLOFEN 10 MG: 10 TABLET ORAL at 08:40

## 2024-11-20 RX ADMIN — AMLODIPINE BESYLATE 10 MG: 10 TABLET ORAL at 08:40

## 2024-11-20 RX ADMIN — Medication 5 MG: at 20:52

## 2024-11-20 RX ADMIN — SENNOSIDES 1 TABLET: 8.6 TABLET, FILM COATED ORAL at 08:40

## 2024-11-20 RX ADMIN — LIDOCAINE 1 PATCH: 4 PATCH TOPICAL at 20:52

## 2024-11-20 RX ADMIN — ACETAMINOPHEN 650 MG: 325 TABLET, FILM COATED ORAL at 19:02

## 2024-11-20 RX ADMIN — ACETAMINOPHEN, CAFFEINE 1 TABLET: 500; 65 TABLET, FILM COATED ORAL at 08:40

## 2024-11-20 RX ADMIN — METOPROLOL TARTRATE 50 MG: 50 TABLET, FILM COATED ORAL at 20:51

## 2024-11-20 RX ADMIN — INSULIN GLARGINE 7 UNITS: 100 INJECTION, SOLUTION SUBCUTANEOUS at 21:07

## 2024-11-20 NOTE — PLAN OF CARE
Goal Outcome Evaluation:      Plan of Care Reviewed With: patient    Overall Patient Progress: no change      Pt. Alert and Oriented x4. Calm and Cooperative. Takes pills whole w/ thin liquids. On bowel program, has 1 BM this shift. Continent w/ both bowel and bladder. Uses purewick at night. Transfers Ax2 w/ Sera steady. Uses helmet and arm sling when OOB. BG= 129mg/dl, bedtime. PRN Serouquel given per request. Lidocaine patch applied at right shoulder. Applied PCD pump per POC, but at left leg only, per pt. Request. Able to use the call light appropriately. Continue POC.       Patient's most recent vital signs are:     Vital signs:  BP: 136/85  Temp: 98.2  HR: 80  RR: 16  SpO2: 100 %     Patient does not have new respiratory symptoms.  Patient does not have new sore throat.  Patient does not have a fever greater than 99.5.

## 2024-11-20 NOTE — PROGRESS NOTES
[facility]   Acute Rehabilitation Unit  Daily Progress Note    INTERVAL HISTORY  No acute events overnight. Vitals stable. Nursing notes reviewed, no acute concerns. Reported sleeping well overnight but difficulty falling asleep, hoping to have baclofen scheduled slightly earlier to help with this. Pain was significantly improved with capsaicin cream, is going to try using it more. Was wondering if blood glucoses can be checked less often. Discontinued short acting insulin and checks given that his glucoses have been well controlled with glargine. Was working with therapies on FES, feels they've been going well. Looking forward to going to [facility]. Patient denied any further questions/concerns at this time.    Functional  PT: 11/20  Bed Mobility: Savanna L, mod-A R rolling, Max A supine<>sit  Transfer: Liko Ax2  Gait: unable, unsafe  Stairs: unable, unsafe  Balance: Able to sit unsupported SBA to min-A. Dynamic posterior/L lean, pt works to correct and is aware of midline. Static standing with RUE support with up to modA for steadying, does best with visual feedback from mirror to maintain midline.     OT: 11/20  ADLs:  Mobility: Ax2 with candie steady. Wheelchair based.   Grooming: SBA seated  Dressing: UB Max A seated in chair. LB Max A with candie steady  Bathing: liko lift Ax2 to/from purple shower chair/commode. Assistance with washing/rinsing/drying 6/10 body parts.   Toileting: Candie steady Ax2 with toilet transfer with commode overlay. Dependent hygiene/clothing.  IADLs: PLOF IND. Recommend assist.  Vision/Cognition: Full time corrective lenses. L neglect, possible peripheral field deficits. Moderate cognitive deficits attention, recall, comprehension, problem solving.    SLP: 11/20  Hearing: WFL  Vision: glasses; significant visual impairment (baseline per patient) as well as left neglect/inattention  Communication: Mild dysarthria; speech intelligibility 80% in  conversation  Cognition: Moderate cogntiive-communication impairment in areas of attention, memory, executive function and visuospatial skills.   Swallow: Regular diet and thin liquids (0). Set up assist as needed, upright position/chair position is ideal, small bites/sips, slow rate, and alternate solids and liquids. ARU swallow eval only, pt independent with swallow strategies.    MEDICATIONS  Scheduled meds  Current Facility-Administered Medications   Medication Dose Route Frequency Provider Last Rate Last Admin    acetaminophen-caffeine (EXCEDRIN TENSION HEADACHE) 500-65 MG tablet 1 tablet  1 tablet Oral Daily Angel Vargas DO   1 tablet at 11/20/24 0840    amLODIPine (NORVASC) tablet 10 mg  10 mg Oral Daily Yessica Villalobos MD   10 mg at 11/20/24 0840    aspirin (ASA) chewable tablet 81 mg  81 mg Oral Daily Zac Hurley MD   81 mg at 11/20/24 0840    atorvastatin (LIPITOR) tablet 40 mg  40 mg Oral QPM Zac Hurley MD   40 mg at 11/19/24 2018    baclofen (LIORESAL) tablet 10 mg  10 mg Oral BID Zac Hurley MD   10 mg at 11/20/24 0840    bisacodyl (DULCOLAX) suppository 10 mg  10 mg Rectal Daily Angel Vargas DO   10 mg at 11/19/24 1822    clopidogrel (PLAVIX) tablet 75 mg  75 mg Oral Daily Zac Hurley MD   75 mg at 11/20/24 0840    clotrimazole (LOTRIMIN) 1 % cream   Topical BID Rosio Montague MD   Given at 11/20/24 0850    enoxaparin ANTICOAGULANT (LOVENOX) injection 40 mg  40 mg Subcutaneous Q24H Zac Hurley MD   40 mg at 11/20/24 1318    insulin aspart (NovoLOG) injection (RAPID ACTING)  1-3 Units Subcutaneous TID  Zac Hurley MD   1 Units at 11/17/24 1719    insulin aspart (NovoLOG) injection (RAPID ACTING)  1-3 Units Subcutaneous At Bedtime Zac Hurley MD        insulin glargine (LANTUS PEN) injection 7 Units  7 Units Subcutaneous QAM  Zac Hurley MD   7 Units at 11/20/24 0848    insulin glargine (LANTUS PEN) injection 7 Units  7 Units  Subcutaneous At Bedtime Zac Hurley MD   7 Units at 11/19/24 2019    Lidocaine (LIDOCARE) 4 % Patch 1 patch  1 patch Transdermal Q24h Angel Vargas DO   1 patch at 11/19/24 2018    losartan (COZAAR) tablet 50 mg  50 mg Oral BID Yessica Villalobos MD   50 mg at 11/20/24 0840    melatonin tablet 5 mg  5 mg Oral At Bedtime Angel Vargas DO   5 mg at 11/19/24 2018    metoprolol tartrate (LOPRESSOR) tablet 50 mg  50 mg Oral BID Yessica Villalobos MD   50 mg at 11/20/24 0840    pantoprazole (PROTONIX) EC tablet 40 mg  40 mg Oral QAM AC Yessica Villalobos MD   40 mg at 11/20/24 0635    sennosides (SENOKOT) tablet 1 tablet  1 tablet Oral Daily Zac Hurley MD   1 tablet at 11/20/24 0840    thiamine (B-1) tablet 100 mg  100 mg Oral Daily Zac Hurley MD   100 mg at 11/20/24 0840       PRN meds:  Current Facility-Administered Medications   Medication Dose Route Frequency Provider Last Rate Last Admin    acetaminophen (TYLENOL) tablet 650 mg  650 mg Oral Q4H PRN Zac Hurley MD   650 mg at 11/18/24 2143    capsaicin (ZOSTRIX) 0.075 % cream   Topical TID PRN Angel Vargas DO        glucose gel 15-30 g  15-30 g Oral Q15 Min PRN Zac Hurley MD        Or    dextrose 50 % injection 25-50 mL  25-50 mL Intravenous Q15 Min PRN Zac Hurley MD        Or    glucagon injection 1 mg  1 mg Subcutaneous Q15 Min PRN Zac Hurley MD        diclofenac (VOLTAREN) 1 % topical gel 4 g  4 g Topical 4x Daily PRN Zac Hurley MD   4 g at 11/17/24 1540    famotidine (PEPCID) tablet 20 mg  20 mg Oral BID PRN Zac Hurley MD        menthol (Topical Analgesic) 2.5% (BENGAY VANISHING SCENT) 2.5 % topical gel   Topical 4x Daily PRN Yessica Villalobos MD   Given at 11/16/24 0842    polyethylene glycol (MIRALAX) Packet 17 g  17 g Oral Daily PRN Zac Hurley MD        QUEtiapine (SEROquel) quarter-tab 6.25 mg  6.25 mg Oral At Bedtime PRN Zac Hurley MD   6.25 mg at 11/19/24 6013         PHYSICAL  "EXAM  /81   Pulse 79   Temp 97.4  F (36.3  C) (Oral)   Resp 16   Ht 1.753 m (5' 9\")   Wt 97 kg (213 lb 13.5 oz)   SpO2 99%   BMI 31.58 kg/m    General: in no acute distress, conversational and alert, working with PT, doing FES  HEENT: Helmet on. Nares clear, conjunctiva white  Pulmonary: on room air, no acute distress.  Cardiovascular: Warm, well perfused  Abdominal: soft, non-tender to palpation, non-distended  Extremities: warm peripherally, no edema in BLEs  Skin: warm, dry, without erythema, ecchymosis.   Neuro: Conjugate gaze, left facial droop. Formal strength exam not completed today. FES on L leg.    LABS  Results for orders placed or performed during the hospital encounter of 11/04/24 (from the past 24 hours)   Glucose by meter   Result Value Ref Range    GLUCOSE BY METER POCT 131 (H) 70 - 99 mg/dL   Glucose by meter   Result Value Ref Range    GLUCOSE BY METER POCT 129 (H) 70 - 99 mg/dL   Glucose by meter   Result Value Ref Range    GLUCOSE BY METER POCT 87 70 - 99 mg/dL   Glucose by meter   Result Value Ref Range    GLUCOSE BY METER POCT 131 (H) 70 - 99 mg/dL       ASSESSMENT AND PLAN  Carl Sierra is a 49 year old left hand dominant male with past medical history of HTN and undiagnosed GLENN, who initially presented to October 2 with hypertensive emergency, left upper extremity weakness and numbness due to right MCA ischemic stroke. He underwent right craniectomy on October 5 for the management of cerebral edema and midline shift. Hospital course was complicated but he clinically improved and was transferred to ARU.     ===========================================================    Updates Today:  - Baclofen scheduled at 8pm instead of 9pm per patient request  - Short acting insulin, AC BG checks discontinued as blood sugars have been well controlled    ============================================================    Admission to acute inpatient rehab: 11/4/2024   Impairment group code: 01.1 " Stroke      PT, OT and SLP 60 minutes of each on a daily basis, 6x a week, for 25 days,  in addition to rehab nursing and close management of physiatrist.     Impairment of ADL's:  OT for 60 min daily, 6x a week for 25 days, to work on upper and lower body self care, dressing, toileting, bathing, energy conservation techniques with use of ADs as needed   Impairment of mobility:   PT for 60 min daily, 6x a week for 25 days, to work on strengthening, endurance buildup, transfers and most likely WC mobility.   Impairment of cognition/language/swallow:   SLP for 60 min daily, 6x a week  for cognitive evaluation and treatment strategies for higher level cognitive deficits and memory impairment   Rehab RN to administer medication, patient education on medication taking, VS monitoring, bowel regimen, glucose monitoring and wound care/surgical wound dressing changes and monitoring.      Medical Conditions  # Right MCA ischemic stroke s/p TNK (10/2/2024)  # Cerebral edema s/p decompressive craniectomy (10/5/24)  # Left Sided Hemiparesis, Flaccid  # Spasticity  Patient presented to [facility] on 10/2 with hypertensive emergency, left upper extremity numbness and weakness.  Stroke code was initiated on arrival and CTA head/neck revealed short segment severe stenosis of the posterior branch of the M2 segment of the right MCA. Decision was made to give tenecteplase.  This did not significantly improve his symptoms.  Brain MRI additionally revealed small right posterior cerebellar stroke and multiple infarcts. Completed 7 day course of keppra. Patient had left sided flaccid hemiplegia on admission, with some return of strength since that time. He has developed some increased tone in the LUE/LLE.   - ASA and plavix  - Acetaminophen-caffeine (Excedrin Tension Headache) 500-65 mg tablet daily (was started on 10/25) . Could consider modafinil if fatigue is a persistent issue.   - stroke secondary prevention as below  -  helmet when OOB   - F/up Neuro (6 weeks post discharge), PM&R (1-2 months post discharge), neurosurgery ([facility]) for bone flap replacement after discharge.   - Baclofen 10 mg BID 11/14      # Headaches  # Neck pain   # L knee pain   Headaches controlled w tylenol PRN, excedrin. Typically starts towards the end of the day and also responded to lying flat and hydration. Pain seems myofascial vs AC joint related, no clinical s/s of radiculopathy. L knee pain x20 years d/t sports injury + likely early OA.   - Discontinued robaxin to avoid concurrent dosing with baclofen.    - tylenol 650 mg Q4H PRN   - added topical bengay and diclofenac cream   - Gabapentin 300 mg at bedtime for headaches  - can consider TENS for the myofascial pain   - Capsaicin cream prn 11/18    # HTN  Not managed PTA. Intermittent HTN while at ARU.   - metoprolol 50 bid   - losartan 50 bid (was bid for better control of morning HTN, can be changed to daily dosing)  - amlodipine 10 daily      # HLD  LDL 98 on 10/3.   - continue lipitor 40mg QD  - repeat lipid panel in 3 months      # T2DM  HgbA1c 9.2 10/3.  - DM education   - Lantus 7 units bid, SSI. Could consider transition to oral agent.   - BG check QID  - f/up PCP     # NICM/HFrEF  TTE performed at OSH. EF 40 to 45% with global hypokinesis but no intracardiac shunt   - repeat TTE and cardiology referral (if outpatient) around 12/5.      # Possible undiagnosed GLENN   Family reported episodes of apnea during the night. Has not needed nocturnal O2 while in ARU.   - continue prn O2  - Sleep study as outpatient        # Normocytic Anemia   Mild and stable, likely due to chronic disease vs CHAD.   - qMon/Thurs CBC     # Insomnia   - continue melatonin   - Seroquel 6.25 mg PO HS PRN      # Depression  # Adjustment to disability  Worsening mood in the setting of his stroke and disability.   - rehab psych consult   - spiritual health consult   - can consider SSR I vs SNRI      #  Neurogenic bowel  - Miralax one packet dailyPRN   - Senna 1 tab qAM  - Bisacodyl 10mg suppository QD     # Neurogenic bladder:   - ok to use primofit  - Urinal during the day, possibly toileting using the commode pending his course       FEN: regular with thin. Continued B1 supplement.   DVT Prophylaxis: DAPT, lovenox 40 mg QD  GI Prophylaxis: Protonix 40 mg QD.  Code: Full - confirmed upon admission  Disposition: he will be WC based and will require assistance with all ADLs and mobility; home is not accessible. Will need as ramp and possibly chair lift for stairs.   ELOS/Discharge Date:  11/22, [facility]  Rehab prognosis:  Good   Follow up Appointments on Discharge:   PCP in 1-2 weeks, needs sleep study  PM&R in 1-2 months.  Stroke neurology (6-8 weeks post discharge)  Neurosurgery ([facility])  Heart Failure Clinic (With TTE ordered prior)   Psychology     Patient reviewed with attending physician, Dr. Vargas, who agrees with the assessment and plan.    Dominga Kirk MD  [facility] PM&R PGY-2  11/20/2024  Pager #: [phone]

## 2024-11-20 NOTE — PROGRESS NOTES
Sw called and spoke to Moses from [facility]. They were running into some insurance issue for authorization. Patient's insurance wanted referral to come from patient's primary care doctor. They called patient's PCP and the office refused to help. Our provider was (Dr Vargas) contacted and was listed for patient pcp.    Still waiting on insurance authorization.       SW went to speak to patient's family about the shared room they are said would think about it and let us know tomorrow if they would accept the bed or not.    Next Steps    Follow up with patient's family  Call [facility] for updates on authorization    TRA Cooper  [facility], Acute Inpatient Rehab Unit   [address] [place] [address]  Phone: [phone]  Fax: [phone]

## 2024-11-20 NOTE — TELEPHONE ENCOUNTER
01/12/18 0900 01/12/18 1000 01/12/18 1100   [facility] Group Therapy   Group Name Community Reintegration Mental Awareness Stress Management   Specific Interventions Current Events Resocialization Sensory Stimulation  (pet therapy)   Participation Level Active;Appropriate;Attentive Active;Appropriate;Attentive Active;Appropriate;Attentive   Participation Quality Cooperative Cooperative Cooperative;Social   Insight/Motivation Good Good Good   Affect/Mood Display Appropriate Appropriate Appropriate   Cognition Alert;Oriented Alert;Oriented Alert;Oriented       01/12/18 1300   [facility] Group Therapy   Group Name Therapeutic Recreation   Specific Interventions --    Participation Level --    Participation Quality Refused;Lack of Interest   Insight/Motivation --    Affect/Mood Display --    Cognition --         Order/Referral Request    Who is requesting: patient    Orders being requested: Referral to [facility]    Reason service is needed/diagnosis: Per insurance company - admission to skilled nursing facility    When are orders needed by: ASAP    Has this been discussed with Provider: No    Does patient have a preference on a Group/Provider/Facility? [facility]    Does patient have an appointment scheduled?: No    Where to send orders: Fax form(being faxed to you) to [phone]    Okay to leave a detailed message?: Yes at Other phone number:  (rachel) [phone]

## 2024-11-20 NOTE — PROGRESS NOTES
Skilled set up on :  Pt dependent for proper placement of electrodes on LUE for optimum muscle contraction, safe positioning on w/c within frame and cushion for prevention of skin breakdown, feet secured to foot pedals, w/c secured to  w/ Q-straints.  Passive motion assessed to ensure proper positioning.       Pt performed 30 minutes of active FES ergometry with 100% stimulation applied to above muscles at 30 rpm with .5 Nm resistance.  This OT adjusted e-stim and cycling parameters in real-time to ensure palpable muscle contractions throughout session.       Changes in parameters that were part of this treatment:   -resistance     Functional outcomes from this intervention include:   -reduced spasticity  -improved sensory awareness and proprioception  -improved muscle strength  -improved motor coordination

## 2024-11-20 NOTE — PLAN OF CARE
Goal Outcome Evaluation:      Plan of Care Reviewed With: patient    Overall Patient Progress: no change    Patient is alert and oriented, calls to make needs known. Slept mostly this shift. No pain complaints. No PRN given/requested. Has Primofit on. Was turned and repositioned. Fall precautions maintained, call light in reach, safety checks completed.     Continue with POC.

## 2024-11-20 NOTE — PLAN OF CARE
Discharge Plan: TCU     Precautions: fall, crani, helmet OOB. L neglect Do not leave alone EOB/commode.      Current Status:  Bed Mobility: Savanna L, mod-A R rolling, Max A supine<>sit  Transfer: Leanao Ax2  Gait: unable, unsafe  Stairs: unable, unsafe  Balance: Able to sit unsupported SBA to min-A. Dynamic posterior/L lean, pt works to correct and is aware of midline. Static standing with RUE support with up to modA for steadying, does best with visual feedback from mirror to maintain midline.     Outcome Measures:   PASS               11/5: 0/36  Rice              11/5: 0/56     Assessment: Facilitated FES for sensory motor re-integration. Pt requires frequent cues for active pedaling, with cues pt demonstrates L LE activation.      Other Barriers to Discharge (DME, Family Training, etc):   1 LUPE (threshold) + 15STI to access bedroom/bathroom. Option for main floor living in [facility].      DME: pending progress    ------------------------------------------------------------------------  Skilled set up on :  Pt dependent for proper placement of electrodes on L TA, gastroc, hamstrings, quads for optimum muscle contraction, safe positioning on w/c within frame and cushion for prevention of skin breakdown, feet secured to foot pedals, w/c secured to  w/ Q-straints.  Passive motion assessed to ensure proper positioning.      Pt performed 35 minutes of active FES ergometry with 100% stimulation applied to above muscles at 35 rpm with 1.04 Nm resistance.  This PT adjusted e-stim and cycling parameters in real-time to ensure palpable muscle contractions throughout session.      Changes in parameters that were part of this treatment:   -resistance  -pulse width, frequency  -amplitude  -pedal speed      Functional outcomes from this intervention include:   -reduced spasticity  -improved sensory awareness and proprioception  -improved muscle strength  -improved motor coordination    Session Summary:   -Average Power:  3.4  -Asymmetry: L 2%  -Active Minutes: 9

## 2024-11-20 NOTE — PLAN OF CARE
"Discharge Planner Post-Acute Rehab SLP:      Discharge Plan: TCU. Ongoing SLP     Precautions: fall, crani/helmet, impulsivity     Current Status:  Hearing: WFL  Vision: glasses; significant visual impairment (baseline per patient) as well as left neglect/inattention  Communication: Mild dysarthria; speech intelligibility 80% in conversation  Cognition: Moderate cogntiive-communication impairment in areas of attention, memory, executive function and visuospatial skills.   Swallow: Regular diet and thin liquids (0). Set up assist as needed, upright position/chair position is ideal, small bites/sips, slow rate, and alternate solids and liquids. ARU swallow eval only, pt independent with swallow strategies.     Assessment: Pt engaging in category sorting and word memorization activity. With red line L anchor, able to categorize appropriately with mild verbal cueing. Pt instructed to code words for delayed memory recall. Pt began deductive reasoning task as distraction. Requested to complete without L visual anchor, however unable to complete. Once red line added, required mod/max verbal cues to complete with 100% accuracy. When probed list from beginning of session, pt able to recall 11/16 words after a 15 minute delay.       Pt attended Cognitive training class today with group of 4 patients. Pt selected for class d/t documented cognitive impairment as measured through standardized or informal evaluation. Class includes group task specific to area of cognitive impairment. Pt was instructed in education, strategies, and application of area to daily settings.  Group activity consisted of a card game based task \"[place] corner\".  Patient was able to learn the rules of the task but ongoing difficulties being able to attend to the left side.  A red box was placed to tommy the left edge but patient not independently utilizing and still requiring verbal cues to attend to left.  When attending to the left appropriately, patient " able to make appropriate plays.  Patient appropriate for the group setting and frequently observed to be utilizing appropriate humor for the situation.    Other Barriers to Discharge (Family Training, etc): none if discharging to TCU.

## 2024-11-20 NOTE — PLAN OF CARE
Goal Outcome Evaluation:      Plan of Care Reviewed With: patient    Overall Patient Progress: no change;Overall Patient Progress: no change      Patient is alert and oriented x4. Able to make needs known. Pt denied SOB, N/V and chest pain. Pt is blood sugar check with insulin given per order parameter. Cranial incision site is open to air. Pt wears helmet when out of bed. Pt is continent of bowel and bladder. Assist of 2 with Candie Castillo. Pt's significant other and mother visited today. Call light within reach.    Patient's most recent vital signs are:     Vital signs:  BP: 114/81  Temp: 97.4  HR: 79  RR: 16  SpO2: 99 %     Patient does not have new respiratory symptoms.  Patient does not have new sore throat.  Patient does not have a fever greater than 99.5.        Problem: Stroke Rehabilitation  Goal: Optimal Adjustment to Stroke  Outcome: Progressing  Intervention: Promote Patient and Family Adjustment to Stroke  Recent Flowsheet Documentation  Taken 11/20/2024 0840 by Hardik Roland RN  Supportive Measures: active listening utilized     Problem: Stroke Rehabilitation  Goal: Optimal Adjustment to Stroke  Intervention: Promote Patient and Family Adjustment to Stroke  Recent Flowsheet Documentation  Taken 11/20/2024 0840 by Hardik Roland RN  Supportive Measures: active listening utilized     Problem: Comorbidity Management  Goal: Blood Glucose Levels Within Targeted Range  Outcome: Progressing     Problem: Comorbidity Management  Goal: Maintenance of Heart Failure Symptom Control  Outcome: Progressing     Problem: Fall Injury Risk  Goal: Absence of Fall and Fall-Related Injury  Outcome: Progressing     Problem: Mobility Impairment  Goal: Optimal Mobility Davidson and Safety  Outcome: Progressing

## 2024-11-20 NOTE — PROGRESS NOTES
Discharge Planner Post-Acute Rehab OT:     Discharge Plan: TCU     Precautions: Falls, Craniectomy - helmet OOB, LUE/LLE hemiparesis, L neglect, do not leave alone at EOB/commode  *Safe sitting plan     Current Status:  ADLs:  Mobility: Ax2 with emile steady. Wheelchair based.   Grooming: SBA seated  Dressing: UB Max A seated in chair. LB Max A with emile steady  Bathing: liko lift Ax2 to/from purple shower chair/commode. Assistance with washing/rinsing/drying 6/10 body parts.   Toileting: Emile steady Ax2 with toilet transfer with commode overlay. Dependent hygiene/clothing.  IADLs: PLOF IND. Recommend assist.  Vision/Cognition: Full time corrective lenses. L neglect, possible peripheral field deficits. Moderate cognitive deficits attention, recall, comprehension, problem solving.      Assessment: Utilized FES for LUE neuro re-education and sensorimotor skills; refer care plan note for further details.   Completed LBD tasks with emile steady with pt demonstrating increased IND.      Other Barriers to Discharge (DME, Family Training, etc):   Home set up: 1 LUPE. 16 stairs to access bedroom/bathroom. Can stay on main level with bedroom/bathroom but no shower.   Caregiver support: Partner working remotely, involved and present on unit.   Equipment needs pending progress.

## 2024-11-21 ENCOUNTER — APPOINTMENT (OUTPATIENT)
Dept: PHYSICAL THERAPY | Facility: CLINIC | Age: 49
DRG: 057 | End: 2024-11-21
Attending: PHYSICAL MEDICINE & REHABILITATION
Payer: COMMERCIAL

## 2024-11-21 ENCOUNTER — APPOINTMENT (OUTPATIENT)
Dept: SPEECH THERAPY | Facility: CLINIC | Age: 49
DRG: 057 | End: 2024-11-21
Attending: PHYSICAL MEDICINE & REHABILITATION
Payer: COMMERCIAL

## 2024-11-21 ENCOUNTER — APPOINTMENT (OUTPATIENT)
Dept: OCCUPATIONAL THERAPY | Facility: CLINIC | Age: 49
DRG: 057 | End: 2024-11-21
Attending: PHYSICAL MEDICINE & REHABILITATION
Payer: COMMERCIAL

## 2024-11-21 LAB
ANION GAP SERPL CALCULATED.3IONS-SCNC: 12 MMOL/L (ref 7–15)
BUN SERPL-MCNC: 6.5 MG/DL (ref 6–20)
CALCIUM SERPL-MCNC: 9.1 MG/DL (ref 8.8–10.4)
CHLORIDE SERPL-SCNC: 106 MMOL/L (ref 98–107)
CREAT SERPL-MCNC: 0.7 MG/DL (ref 0.67–1.17)
EGFRCR SERPLBLD CKD-EPI 2021: >90 ML/MIN/1.73M2
ERYTHROCYTE [DISTWIDTH] IN BLOOD BY AUTOMATED COUNT: 16.6 % (ref 10–15)
GLUCOSE BLDC GLUCOMTR-MCNC: 109 MG/DL (ref 70–99)
GLUCOSE BLDC GLUCOMTR-MCNC: 116 MG/DL (ref 70–99)
GLUCOSE SERPL-MCNC: 108 MG/DL (ref 70–99)
HCO3 SERPL-SCNC: 23 MMOL/L (ref 22–29)
HCT VFR BLD AUTO: 33.5 % (ref 40–53)
HGB BLD-MCNC: 11.2 G/DL (ref 13.3–17.7)
MCH RBC QN AUTO: 26.7 PG (ref 26.5–33)
MCHC RBC AUTO-ENTMCNC: 33.4 G/DL (ref 31.5–36.5)
MCV RBC AUTO: 80 FL (ref 78–100)
PLATELET # BLD AUTO: 217 10E3/UL (ref 150–450)
POTASSIUM SERPL-SCNC: 3.6 MMOL/L (ref 3.4–5.3)
RBC # BLD AUTO: 4.2 10E6/UL (ref 4.4–5.9)
SODIUM SERPL-SCNC: 141 MMOL/L (ref 135–145)
WBC # BLD AUTO: 4.8 10E3/UL (ref 4–11)

## 2024-11-21 PROCEDURE — 97130 THER IVNTJ EA ADDL 15 MIN: CPT | Mod: GN | Performed by: SPEECH-LANGUAGE PATHOLOGIST

## 2024-11-21 PROCEDURE — 99232 SBSQ HOSP IP/OBS MODERATE 35: CPT | Mod: GC | Performed by: PHYSICAL MEDICINE & REHABILITATION

## 2024-11-21 PROCEDURE — 97530 THERAPEUTIC ACTIVITIES: CPT | Mod: GO | Performed by: OCCUPATIONAL THERAPIST

## 2024-11-21 PROCEDURE — 97530 THERAPEUTIC ACTIVITIES: CPT | Mod: GP

## 2024-11-21 PROCEDURE — 85027 COMPLETE CBC AUTOMATED: CPT

## 2024-11-21 PROCEDURE — 90832 PSYTX W PT 30 MINUTES: CPT | Performed by: CLINICAL NEUROPSYCHOLOGIST

## 2024-11-21 PROCEDURE — 250N000011 HC RX IP 250 OP 636

## 2024-11-21 PROCEDURE — 97112 NEUROMUSCULAR REEDUCATION: CPT | Mod: GP

## 2024-11-21 PROCEDURE — 97129 THER IVNTJ 1ST 15 MIN: CPT | Mod: GN | Performed by: SPEECH-LANGUAGE PATHOLOGIST

## 2024-11-21 PROCEDURE — 128N000003 HC R&B REHAB

## 2024-11-21 PROCEDURE — 250N000013 HC RX MED GY IP 250 OP 250 PS 637

## 2024-11-21 PROCEDURE — 36415 COLL VENOUS BLD VENIPUNCTURE: CPT

## 2024-11-21 PROCEDURE — 250N000013 HC RX MED GY IP 250 OP 250 PS 637: Performed by: PHYSICAL MEDICINE & REHABILITATION

## 2024-11-21 PROCEDURE — 97112 NEUROMUSCULAR REEDUCATION: CPT | Mod: GO | Performed by: OCCUPATIONAL THERAPIST

## 2024-11-21 PROCEDURE — 80048 BASIC METABOLIC PNL TOTAL CA: CPT

## 2024-11-21 RX ORDER — SENNOSIDES 8.6 MG
1 TABLET ORAL 2 TIMES DAILY
DISCHARGE
Start: 2024-11-21

## 2024-11-21 RX ORDER — AMLODIPINE BESYLATE 10 MG/1
10 TABLET ORAL DAILY
DISCHARGE
Start: 2024-11-22

## 2024-11-21 RX ORDER — LOSARTAN POTASSIUM 50 MG/1
50 TABLET ORAL 2 TIMES DAILY
DISCHARGE
Start: 2024-11-21

## 2024-11-21 RX ORDER — CLOTRIMAZOLE 1 %
CREAM (GRAM) TOPICAL 2 TIMES DAILY
DISCHARGE
Start: 2024-11-21

## 2024-11-21 RX ORDER — CLOPIDOGREL BISULFATE 75 MG/1
75 TABLET ORAL DAILY
DISCHARGE
Start: 2024-11-22

## 2024-11-21 RX ORDER — BISACODYL 10 MG
10 SUPPOSITORY, RECTAL RECTAL DAILY
DISCHARGE
Start: 2024-11-21

## 2024-11-21 RX ORDER — ASPIRIN 81 MG/1
81 TABLET, CHEWABLE ORAL DAILY
DISCHARGE
Start: 2024-11-22

## 2024-11-21 RX ORDER — LANOLIN ALCOHOL/MO/W.PET/CERES
100 CREAM (GRAM) TOPICAL DAILY
DISCHARGE
Start: 2024-11-22

## 2024-11-21 RX ORDER — SENNOSIDES 8.6 MG
1 TABLET ORAL 2 TIMES DAILY
Status: DISCONTINUED | OUTPATIENT
Start: 2024-11-21 | End: 2024-11-22 | Stop reason: HOSPADM

## 2024-11-21 RX ORDER — LIDOCAINE 4 G/G
1 PATCH TOPICAL EVERY 24 HOURS
DISCHARGE
Start: 2024-11-21

## 2024-11-21 RX ORDER — CAPSAICIN 0.75 MG/G
CREAM TOPICAL 3 TIMES DAILY
DISCHARGE
Start: 2024-11-21

## 2024-11-21 RX ORDER — ATORVASTATIN CALCIUM 40 MG/1
40 TABLET, FILM COATED ORAL EVERY EVENING
DISCHARGE
Start: 2024-11-21

## 2024-11-21 RX ORDER — PANTOPRAZOLE SODIUM 40 MG/1
40 TABLET, DELAYED RELEASE ORAL
DISCHARGE
Start: 2024-11-22

## 2024-11-21 RX ORDER — METOPROLOL TARTRATE 50 MG
50 TABLET ORAL 2 TIMES DAILY
DISCHARGE
Start: 2024-11-21

## 2024-11-21 RX ORDER — QUETIAPINE FUMARATE 25 MG/1
6.25 TABLET, FILM COATED ORAL
DISCHARGE
Start: 2024-11-21

## 2024-11-21 RX ORDER — BACLOFEN 10 MG/1
10 TABLET ORAL 2 TIMES DAILY
DISCHARGE
Start: 2024-11-21

## 2024-11-21 RX ADMIN — LOSARTAN POTASSIUM 50 MG: 50 TABLET, FILM COATED ORAL at 07:59

## 2024-11-21 RX ADMIN — BISACODYL 10 MG: 10 SUPPOSITORY RECTAL at 18:28

## 2024-11-21 RX ADMIN — CLOTRIMAZOLE: 1 CREAM TOPICAL at 08:01

## 2024-11-21 RX ADMIN — CAPSAICIN: 0.75 CREAM TOPICAL at 21:46

## 2024-11-21 RX ADMIN — METOPROLOL TARTRATE 50 MG: 50 TABLET, FILM COATED ORAL at 08:00

## 2024-11-21 RX ADMIN — CAPSAICIN: 0.75 CREAM TOPICAL at 08:01

## 2024-11-21 RX ADMIN — Medication 5 MG: at 21:43

## 2024-11-21 RX ADMIN — ACETAMINOPHEN, CAFFEINE 1 TABLET: 500; 65 TABLET, FILM COATED ORAL at 08:00

## 2024-11-21 RX ADMIN — PANTOPRAZOLE SODIUM 40 MG: 40 TABLET, DELAYED RELEASE ORAL at 08:00

## 2024-11-21 RX ADMIN — BACLOFEN 10 MG: 10 TABLET ORAL at 21:43

## 2024-11-21 RX ADMIN — CAPSAICIN: 0.75 CREAM TOPICAL at 13:53

## 2024-11-21 RX ADMIN — LOSARTAN POTASSIUM 50 MG: 50 TABLET, FILM COATED ORAL at 21:42

## 2024-11-21 RX ADMIN — AMLODIPINE BESYLATE 10 MG: 10 TABLET ORAL at 08:00

## 2024-11-21 RX ADMIN — METOPROLOL TARTRATE 50 MG: 50 TABLET, FILM COATED ORAL at 21:42

## 2024-11-21 RX ADMIN — ASPIRIN 81 MG CHEWABLE TABLET 81 MG: 81 TABLET CHEWABLE at 08:00

## 2024-11-21 RX ADMIN — SENNOSIDES 1 TABLET: 8.6 TABLET, FILM COATED ORAL at 21:44

## 2024-11-21 RX ADMIN — CLOPIDOGREL BISULFATE 75 MG: 75 TABLET ORAL at 08:00

## 2024-11-21 RX ADMIN — ATORVASTATIN CALCIUM 40 MG: 40 TABLET, FILM COATED ORAL at 21:44

## 2024-11-21 RX ADMIN — INSULIN GLARGINE 7 UNITS: 100 INJECTION, SOLUTION SUBCUTANEOUS at 21:47

## 2024-11-21 RX ADMIN — ENOXAPARIN SODIUM 40 MG: 40 INJECTION SUBCUTANEOUS at 13:53

## 2024-11-21 RX ADMIN — SENNOSIDES 1 TABLET: 8.6 TABLET, FILM COATED ORAL at 08:00

## 2024-11-21 RX ADMIN — CLOTRIMAZOLE: 1 CREAM TOPICAL at 21:47

## 2024-11-21 RX ADMIN — THIAMINE HCL TAB 100 MG 100 MG: 100 TAB at 08:00

## 2024-11-21 RX ADMIN — BACLOFEN 10 MG: 10 TABLET ORAL at 08:00

## 2024-11-21 RX ADMIN — INSULIN GLARGINE 7 UNITS: 100 INJECTION, SOLUTION SUBCUTANEOUS at 08:00

## 2024-11-21 NOTE — PROGRESS NOTES
Discharge Planner Post-Acute Rehab OT:      Discharge Plan: TCU     Precautions: Falls, Craniectomy - helmet OOB, LUE/LLE hemiparesis, L neglect, do not leave alone at EOB/commode  *Safe sitting plan     Current Status:  ADLs:  Mobility: Ax2 with emile steady. Wheelchair based.   Grooming: SBA seated  Dressing: UB Max A seated in chair. LB Max A with emile steady  Bathing: liko lift Ax2 to/from purple shower chair/commode. Assistance with washing/rinsing/drying 6/10 body parts.   Toileting: Emile steady Ax2 with toilet transfer with commode overlay. Dependent hygiene/clothing.  IADLs: PLOF IND. Recommend assist.  Vision/Cognition: Full time corrective lenses. L neglect, possible peripheral field deficits. Moderate cognitive deficits attention, recall, comprehension, problem solving.      Assessment: Utilized Xcite for LUE neuro re-education and sensorimotor skills; refer to care plan note for further details.      Other Barriers to Discharge (DME, Family Training, etc):   Home set up: 1 LUPE. 16 stairs to access bedroom/bathroom. Can stay on main level with bedroom/bathroom but no shower.   Caregiver support: Partner working remotely, involved and present on unit.   Equipment needs pending progress.

## 2024-11-21 NOTE — PLAN OF CARE
Acute Rehab Care Conference/Team Rounds      Type: Team Rounds    Present: Dr. Vargas, Resident Dominga Kirk, Dr. Elias Neuropsychologist, Elizabeth Gerard PT, Angela Moran OT, Cassie Benitez SLP, Anabella Malagon PCS, Kay Caldwell , Wendy Sims RD, Judy Cannon RN, and Carl Sierra Patient.       Discharge Barriers/Treatment/Education    Rehab Diagnosis:  post CVA with L hemiparesis and neglect     Active Medical Co-morbidities/Prognosis:     Patient Active Problem List   Diagnosis    Acute ischemic right MCA stroke (H)   Gait instability  HTN        Safety: A & O X 4 A 2 with sear steady    Pain: Denies pain    Medications, Skin, Tubes/Lines:  PO intake     Swallowing/Nutrition: Regular solids/Thin Liquids (0). ARU SLP clinical swallow evaluation only, patient independently utilizes trained compensatory swallow strategies.     Bowel/Bladder: Continent of B & B (Primo fit)    Psychosocial: Lives with significant other, Renea. Grew up in [place], mother still lives there but is able to fly in. Significant other's family supportive. Works full-time as an  at the [employer].     ADLs/IADLs: Pt is making steady progress with ADLs. Pt requires mod A with UBD tasks, and max A with LBD tasks with emile steady. Pt requires Ax2 to/from toilet with commode overlay with emile steady, and Ax2 with toilet hygiene with emile steady. Pt requires SBA with G/H tasks seated. Utilizing FES and Xcite for LUE neuro re-education and sensorimotor skills. Recommend ongoing OT services at TCU.     Mobility: Pt demonstrates steady progress in functional mobility, most notable progress this past week in postural midline and emerging L LE activation. Functionally pt requires Ax1-2 for bed mobility, transfers with serasteady Ax1-2. Pt will benefit from ongoing rehab at TCU.    Cognition/Language: Min to mild dysarthria, does not impact communication of wants/needs. Language intact.  Pt making slow but steady progress towards cognitive goals, left neglect continues to impact performance on SLP cognitive-linguistic tasks. With visual compensatory strategy (red anchor line), pt requiring mild to max cues to attend to left side. Pt motivated to improve, ongoing SLP for cognition.    Community Re-Entry: Recommend ongoing rehab at [facility] prior to community re-entry    Transportation: [facility] based transport for short rides <2 hr, stretcher based for longer durations.    Decision maker: self    Plan of Care and goals reviewed and updated.    Discharge Plan/Recommendations    Fall Precautions: continue    Patient/Family input to goals: yes     Estimated length of stay: 18 days     Overall plan for the patient: reach a level of mod I       Utilization Review and Continued Stay Justification    Medical Necessity Criteria:    For any criteria that is not met, please document reason and plan for discharge, transfer, or modification of plan of care to address.    Requires intensive rehabilitation program to treat functional deficits?: Yes    Requires 3x per week or greater involvement of rehabilitation physician to oversee rehabilitation program?: Yes    Requires rehabilitation nursing interventions?: Yes    Patient is making functional progress?: Yes    There is a potential for additional functional progress? Yes    Patient is participating in therapy 3 hours per day a minimum of 5 days per week or 15 hours per week in 7 day period?:Yes    Has discharge needs that require coordinated discharge planning approach?:Yes      Barriers/Concerns related to meeting medical necessity criteria:  none    Team Plan to Address Concern:  As needed      Final Physician Sign off    Statement of Approval:   I agree with all the recommendations detailed in this document.      Angel Vargas, DO      Patient Goals  Social Work Goals: Confirm discharge recommendations with therapy, coordinate safe discharge plan and remain  available to support and assist as needed.    OT Predicted Duration/Target Date for Goal Attainment: 12/03/24  Therapy Frequency (OT): 6 times/week  OT: Hygiene/Grooming: independent  OT: Upper Body Dressing: Minimal assist  OT: Lower Body Dressing: Minimal assist  OT: Upper Body Bathing: Minimal assist  OT: Lower Body Bathing: Minimal assist  OT: Bed Mobility: Minimal assist  OT: Transfer: Minimal assist  OT: Toilet Transfer/Toileting: Moderate assist  OT: Cognitive: Patient/caregiver will verbalize understanding of cognitive assessment results/recommendations as needed for safe discharge planning  OT: Goal 1: Patient will demo Min A functional transfer to shower using DME/AE prn     PT Predicted Duration/Target Date for Goal Attainment: 12/02/24  PT Frequency: 6x/week  PT: Bed Mobility: Minimal assist  PT: Transfers: Minimal assist  PT: Wheelchair Mobility: 150 feet, manual wheelchair  PT: Goal 1: Pt and family will independently verbalize 3 fall prevention strategies  PT: Goal 2: Pt's family will complete caregiver training and demonstrate 100% consistency w/ safe assist  for transfers, bed mobility, w/c management and positioning w/ pt prior to discharge.                      SLP Predicted Duration/Target Date for Goal Attainment: 12/18/24  Therapy Frequency (SLP Eval): 6 times/week  SLP: Goal 1: Patient will attend to left side within tasks given strategies and mod cues.  SLP: Goal 2: Patient will recall important information with use of external aids and strategies given min cues.  SLP: Goal 3: Patient will complete easy to moderate level executive function and problem solving/reasoning tasks with 80% accuracy given min cues.           Nursing Goals  Bowel and Bladder care  Fall prevention   Medication Education  Skin Care protection     Patient Goal:  Pain Management: The pt. will rate his pain as 4 or lower during his stay at the unit    Patient/Family Goal: Bowel: The pt, will be continent of bowel by use  of toileting or devices by the next BM    Patient/Family Goal: Bladder: The pt. will be continent of bladder by use of timed toiletting or assistive devices     Goal: Safety Management: the pt. will use call light appropriately and wait for assistance for any requests they may have

## 2024-11-21 NOTE — DISCHARGE SUMMARY
General acute hospital   Acute Rehabilitation Unit  Discharge summary     Date of Admission: 11/4/2024  Date of Discharge: 11/22/24  Disposition: [facility]  Primary Care Physician: No primary care provider on file.  Attending physician: Angel Vargas DO      DISCHARGE DIAGNOSIS    Impairment group code: 01.1 Stroke   L hemiplegia, dysarthria, spacticity, L neglect  Impaired functional mobility  Impaired ADLs  Impaired cognition   Cerebral edema s/p decompressive craniectomy  Headaches  Neck pain  Hyperlipidemia  Hypertension  Type II Diabetes  NICM/HFrEF  Insomnia  Neurogenic bowel/bladder      BRIEF SUMMARY  Copied from HPI from H&P: Carl Sierra is a 49 year old left hand dominant male with past medical history of untreated/undiagnosed obstructive sleep apnea and hypertension who was initially admitted on October 2nd with hypertensive emergency, left upper extremity numbness and weakness.  He was found to have right MCA ischemic stroke and NORMA.  He received TNK on October 2 with no significant improvement of his symptoms.  Brain MRI also showed small right posterior cerebellar stroke and multiple old infarcts.     His hospital course was complicated by progression of his symptoms due to worsening cerebral edema and midline shift requiring right decompressive craniectomy on October 5.  He was also diagnosed by DM II, HLD, nonischemic cardiomyopathy and heart failure with reserved ejection fraction, oral candidiasis, and strep group B and H. Influenza infection (received 5 days course of amoxicillin 10/11-16)  and depression.     Repeat head CT on 10/29 showed revolving infarction, unchanged degree of edema and herniation and some petechial hemorrhage which was also grossly unchanged.      Unfortunately his left-sided weakness has been persistent. Staples were removed from his surgical incision site and he has to use helmet when he is out of bed.  He has had some episodes of  waxing and waning of his mental status and headaches.  It seems like his mental status and headaches have both improved with Tylenol and other medical management.  He required NG tube placement and tube feeding early after his surgery but that was removed and he has been able to eat well.  He had hyponatremia which resolved.  Neurosurgery team is planning for a cranioplasty in about 4 to 6 weeks.  Cardiology team also recommended repeat echocardiogram in a month and follow-up in clinic.     During acute hospitalization, patient was seen and evaluated by PT, OT, and SLP who collectively recommended that patient would benefit from ongoing therapies in the acute inpatient rehabilitation setting, and patient was admitted on 11/4/2024.      Per therapy notes, he has been able to follow one-step commands.  He requires max assist of 2 and lift for transfers and also A of 2 for all mobility and ADLs.  He is on a regular diet with thin liquids but needs assistance with eating.  He has been constipated and receiving bowel meds.  They have been using primofit all day and all night.  He has left-sided inattention, impaired cognition and language which needs further evaluation during his rehab stay.     Upon arrival to the rehab unit, patient was seen at the bedside.  His partner Renea and mother Mya were both available and very supportive.  He reported intermittent headaches, some left knee pain and some tightness and muscle spasms in his neck area and right trap.  He feels the urge for voiding but has been using primofit as above.  He also noted that he has been constipated and his last bowel movement was about 2 days ago.  He also reported left-sided weakness and some numbness.  Appetite has been low. Family and really both confirmed changes in his cognition and language.    REHABILITATION COURSE  OT 11/21: ADLs:  Mobility: Ax2 with emile steady. Wheelchair based.   Grooming: SBA seated  Dressing: UB Max A seated in chair.  LB Max A with emile steady  Bathing: liko lift Ax2 to/from purple shower chair/commode. Assistance with washing/rinsing/drying 6/10 body parts.   Toileting: Emile steady Ax2 with toilet transfer with commode overlay. Dependent hygiene/clothing.  IADLs: PLOF IND. Recommend assist.  Vision/Cognition: Full time corrective lenses. L neglect, possible peripheral field deficits. Moderate cognitive deficits attention, recall, comprehension, problem solving.   Assessment: Utilized Xcite for LUE neuro re-education and sensorimotor skills; refer to care plan note for further details.   Other Barriers to Discharge (DME, Family Training, etc):   Home set up: 1 LUPE. 16 stairs to access bedroom/bathroom. Can stay on main level with bedroom/bathroom but no shower.   Caregiver support: Partner working remotely, involved and present on unit.   Equipment needs pending progress.      PT 11/21: Goals partially met.  Barriers to achieving goals:   discharge from facility.  Therapy recommendation(s):    Continued therapy is recommended.  Rationale/Recommendations:  recommend TCU for ongoing rehab.  Bed Mobility: modA  Transfer: serasteady Ax2 nsg, Ax1 therapy  Gait: unable, unsafe  Stairs: unable, unsafe  Balance: seated balance requires [place], do not leave alone on toilet or EOB due to L/posterior lean  Outcome Measures:   PASS               11/5: 0/36              11/21: 8/36  Rice              11/5: 0/56      SLP 11/22:  Goals partially met.  Barriers to achieving goals:   discharge from facility.  Therapy recommendation(s):    Continued therapy is recommended.  Rationale/Recommendations:  Ongoing SLP for cognition.  Status 11/21:  Hearing: WFL  Vision: glasses; significant visual impairment (baseline per patient) as well as left neglect/inattention  Communication: Mild dysarthria; speech intelligibility 80% in conversation  Cognition: Moderate cogntiive-communication impairment in areas of attention, memory, executive function and  visuospatial skills.   Swallow: Regular diet and thin liquids (0). Set up assist as needed, upright position/chair position is ideal, small bites/sips, slow rate, and alternate solids and liquids. ARU SLP swallow eval only, pt independent with swallow strategies  CLQT 11/05:  Cognitive Domain                   Severity Rating/Score  Attention                                             Severe/46  Memory                                               Moderate/137  Executive Functions                            Severe/5  Language                                           WNL/29  Visuospatial Skills                               Severe/20  Composite Severity Rating                 Moderate/1.8  Clock Drawing Severity Rating           Mild/11      MEDICAL COURSE  # Right MCA ischemic stroke s/p TNK (10/2/2024)  # Cerebral edema s/p decompressive craniectomy (10/5/24)  # Left Sided Hemiparesis, Flaccid  # Spasticity  Patient [facility] to [facility] with hypertensive emergency, left upper extremity numbness and weakness.  Stroke code was initiated on arrival and CTA head/neck revealed short segment severe stenosis of the posterior branch of the M2 segment of the right MCA. Decision was made to give tenecteplase.  This did not significantly improve his symptoms.  Brain MRI additionally revealed small right posterior cerebellar stroke and multiple infarcts. Completed 7 day course of keppra. Patient had left sided flaccid hemiplegia on admission, with some return of strength since that time. He has developed some increased tone in the LUE/LLE.   - ASA and plavix  - Acetaminophen-caffeine (Excedrin Tension Headache) 500-65 mg tablet daily. Could consider modafinil if fatigue is a persistent issue.   - helmet when OOB   - F/up Neuro (6 weeks post discharge), PM&R (1-2 months post discharge), neurosurgery ([facility]) for bone flap replacement after discharge.   - Baclofen 10 mg BID      #  Headaches  # Neck pain   # L knee pain   Headaches controlled w tylenol PRN, excedrin. Typically starts towards the end of the day and also responded to lying flat and hydration. Pain seems myofascial vs AC joint related, no clinical s/s of radiculopathy. L knee pain x20 years d/t sports injury + likely early OA.   - Discontinued robaxin to avoid concurrent dosing with baclofen.    - topical bengay and diclofenac cream   - Gabapentin 300 mg at bedtime for headaches  - Capsaicin cream TID     # HTN  Not managed PTA. Intermittent HTN while at ARU.   - metoprolol 50 bid   - losartan 50 bid  - amlodipine 10 daily      # HLD  LDL 98 on 10/3.   - lipitor 40mg QD  - repeat lipid panel in 3 months      # T2DM  HgbA1c 9.2 10/3.  - Lantus 7 units bid. Could consider transition to oral agent.   - f/up PCP     # NICM/HFrEF  TTE performed at OSH. EF 40 to 45% with global hypokinesis but no intracardiac shunt   - repeat TTE and cardiology referral ordered     # Possible undiagnosed GLENN   Family reported episodes of apnea during the night. Has not needed nocturnal O2 while in ARU.   - Sleep study as outpatient        # Normocytic Anemia   Mild and stable, likely due to chronic disease vs CHAD. Stable prior to discharge.     # Insomnia   - melatonin   - Seroquel 6.25 mg PO HS PRN      # Depression  # Adjustment to disability  Worsening mood in the setting of his stroke and disability.   - Referral placed for OP psychology     # Neurogenic bowel  - Miralax one packet daily PRN   - Senna 1 tab BID  - Bisacodyl 10mg suppository QD     # Neurogenic bladder:   - Primofit. Urinal during the day    DISCHARGE MEDICATIONS  Current Discharge Medication List        START taking these medications    Details   acetaminophen-caffeine (EXCEDRIN TENSION HEADACHE) 500-65 MG TABS Take 1 tablet by mouth daily.    Associated Diagnoses: Acute ischemic right MCA stroke (H)      amLODIPine (NORVASC) 10 MG tablet Take 1 tablet (10 mg) by mouth daily.     Associated Diagnoses: Acute ischemic right MCA stroke (H)      aspirin (ASA) 81 MG chewable tablet Take 1 tablet (81 mg) by mouth daily.    Associated Diagnoses: Acute ischemic right MCA stroke (H)      atorvastatin (LIPITOR) 40 MG tablet Take 1 tablet (40 mg) by mouth every evening.    Associated Diagnoses: Acute ischemic right MCA stroke (H)      baclofen (LIORESAL) 10 MG tablet Take 1 tablet (10 mg) by mouth 2 times daily.    Associated Diagnoses: Acute ischemic right MCA stroke (H)      bisacodyl (DULCOLAX) 10 MG suppository Place 1 suppository (10 mg) rectally daily.    Associated Diagnoses: Acute ischemic right MCA stroke (H)      capsaicin (ZOSTRIX) 0.075 % cream Apply topically 3 times daily.    Associated Diagnoses: Acute ischemic right MCA stroke (H)      clopidogrel (PLAVIX) 75 MG tablet Take 1 tablet (75 mg) by mouth daily.    Associated Diagnoses: Acute ischemic right MCA stroke (H)      clotrimazole (LOTRIMIN) 1 % external cream Apply topically 2 times daily.    Associated Diagnoses: Acute ischemic right MCA stroke (H)      !! insulin glargine (LANTUS PEN) 100 UNIT/ML pen Inject 7 Units subcutaneously every morning (before breakfast).    Comments: If Lantus is not covered by insurance, may substitute Basaglar or Semglee or other insulin glargine product per insurance preference at same dose and frequency.    Associated Diagnoses: Acute ischemic right MCA stroke (H)      !! insulin glargine (LANTUS PEN) 100 UNIT/ML pen Inject 7 Units subcutaneously at bedtime.    Comments: If Lantus is not covered by insurance, may substitute Basaglar or Semglee or other insulin glargine product per insurance preference at same dose and frequency.    Associated Diagnoses: Acute ischemic right MCA stroke (H)      Lidocaine (LIDOCARE) 4 % Patch Place 1 patch over 12 hours onto the skin every 24 hours. To prevent lidocaine toxicity, patient should be patch free for 12 hrs daily.    Associated Diagnoses: Acute ischemic  right MCA stroke (H)      losartan (COZAAR) 50 MG tablet Take 1 tablet (50 mg) by mouth 2 times daily.    Associated Diagnoses: Acute ischemic right MCA stroke (H)      melatonin 5 MG tablet Take 1 tablet (5 mg) by mouth at bedtime.    Associated Diagnoses: Acute ischemic right MCA stroke (H)      metoprolol tartrate (LOPRESSOR) 50 MG tablet Take 1 tablet (50 mg) by mouth 2 times daily.    Associated Diagnoses: Acute ischemic right MCA stroke (H)      pantoprazole (PROTONIX) 40 MG EC tablet Take 1 tablet (40 mg) by mouth every morning (before breakfast).    Associated Diagnoses: Acute ischemic right MCA stroke (H)      QUEtiapine (SEROQUEL) 25 MG tablet Take 0.25 tablets (6.25 mg) by mouth nightly as needed (sleep, anxiety).    Associated Diagnoses: Acute ischemic right MCA stroke (H)      sennosides (SENOKOT) 8.6 MG tablet Take 1 tablet by mouth 2 times daily.    Associated Diagnoses: Acute ischemic right MCA stroke (H)      thiamine (B-1) 100 MG tablet Take 1 tablet (100 mg) by mouth daily.    Associated Diagnoses: Acute ischemic right MCA stroke (H)       !! - Potential duplicate medications found. Please discuss with provider.        CONTINUE these medications which have NOT CHANGED    Details   fluticasone (FLONASE) 50 MCG/ACT spray Spray 1 spray into both nostrils daily as needed for rhinitis or allergies               DISCHARGE INSTRUCTIONS AND FOLLOW UP  Discharge Procedure Orders   Primary Care Referral   Standing Status: Future   Referral Priority: Priority: 1-2 Weeks Referral Type: Consultation   Number of Visits Requested: 1     Adult Physical Medicine and Rehab  Referral   Standing Status: Future   Referral Priority: Routine: Next available opening Referral Type: Consultation   Number of Visits Requested: 1     Adult Neurology  Referral   Standing Status: Future   Referral Priority: Routine: Next available opening Referral Type: Consultation   Number of Visits Requested: 1     Adult  Neurosurgery  Referral   Standing Status: Future   Referral Priority: Routine: Next available opening Referral Type: Consultation   Referral Location: [facility] (an [facility] of [facility])   Requested Specialty: Neurological Surgery   Number of Visits Requested: 1     Adult Cardiology Eval  Referral   Standing Status: Future   Referral Priority: Routine: Next available opening Referral Type: CV Cardio consult   Requested Specialty: Cardiovascular Disease   Number of Visits Requested: 1     Adult Mental Health  Referral   Standing Status: Future   Referral Priority: Routine: Next available opening Referral Type: Mental Health Outpatient   Number of Visits Requested: 1     General info for SNF   Order Comments: Length of Stay Estimate: Short Term Care: Estimated # of Days <30  Condition at Discharge: Stable  Level of care:skilled   Rehabilitation Potential: Good  Admission H&P remains valid and up-to-date: Yes  Recent Chemotherapy: N/A  Use Nursing Home Standing Orders: Yes     Mantoux instructions   Order Comments: Give two-step Mantoux (PPD) Per Facility Policy Yes     Reason for your hospital stay   Order Comments: You were admitted for a stroke.   No driving (car, motorcycle, riding lawnmower, tractor, any motorized vehicle) until cleared by OT driving evaluation and by your PM&R physician. No power tools.     A responsible adult should be present with you when cooking in the kitchen and should supervise/double check medications and finances.    No smoking. This is very important to help prevent another stroke. If you feel the urge to smoke or need any assistance, please call [place] Quit Plan, call your primary for assistance with medications/counseling, etc.     No alcohol for at least 1 year.    To reduce the risk of subsequent stroke there are several important factors including optimal management of anticoagulants, blood pressure,  "cholesterol, diabetes and smoking abstinence.    Blood Pressure:  Keeping your blood pressures less than 130/80 has been shown to reduce risk of recurrent stroke. Recording your blood pressure and heart rate once daily in a log book can help you and your providers make decisions on optimal management. You are encouraged to bring your log book with you to your primary physician and/or cardiology doctor visits.    Several lifestyle modifications have been associated with blood pressure reduction and are an important part of a comprehensive plan. These include: weight loss (if over-weight); a diet low in salt and cholesterol and rich in fruits and vegetables; regular aerobic physical activity and limited alcohol consumption.    Diabetes:  Please follow up with you PCP regarding your diabetes management, continue current insulin regimen.    Cholesterol:  Traditional target levels for LDL cholesterol or \"bad cholesterol\" is less than 130 however once you have had a stroke, your target LDL level is now less than 70. Additional recommendations such as increasing your HDL or \"good\" cholesterol and lowering your triglyceride level can also be important.    Your most recent lipid panel was   Lab Results       Component                Value               Date                       CHOL                     153                 09/06/2017            Lab Results       Component                Value               Date                       HDL                      47                  09/06/2017            Lab Results       Component                Value               Date                       LDL                      82                  09/06/2017            Lab Results       Component                Value               Date                       TRIG                     118                 09/06/2017            No results found for: \"CHOLHDLRATIO\"    This should be followed up in 2-3 months with your primary " provider.    Smoking:  Finally one of the most important modifiable risk factors is to not smoke. This includes cigarettes, pipes, cigars, chewing tobacco and second hand smoke. Support through counseling, nicotine replacement, and oral smoking-cessation medications may all be helpful. Often people have been able to quit during their hospitalization but once returning to their familiar environment, the urges can be stronger. If this is the case, we encourage you to get support. There are numerous options, start by talking with your doctor.     Activity - Up with nursing assistance     Order Specific Question Answer Comments   Is discharge order? Yes      Full Code     Order Specific Question Answer Comments   Code status determined by: Discussion with patient/ legal decision maker      Physical Therapy Adult Consult   Order Comments: Evaluate and treat as clinically indicated.    Reason:  CVA     Occupational Therapy Adult Consult   Order Comments: Evaluate and treat as clinically indicated.    Reason:  CVA     Speech Language Path Adult Consult   Order Comments: Evaluate and treat as clinically indicated.    Reason:  CVA     Fall precautions     Diet   Order Comments: Follow this diet upon discharge: Current Diet:Orders Placed This Encounter      Room Service      Combination Diet Regular Diet; Thin Liquids (level 0)     Order Specific Question Answer Comments   Is discharge order? Yes           PHYSICAL EXAMINATION    Most recent Vital Signs:   Vitals:    11/20/24 1919 11/20/24 2051 11/21/24 0733 11/21/24 1550   BP: 120/74 139/78 138/87 117/71   BP Location: Right arm Right arm Right arm Right arm   Patient Position: Semi-Vargas's Semi-Vargas's     Cuff Size: Adult Large Adult Large     Pulse: 76 75 78 72   Resp:  16 16 16   Temp:  98  F (36.7  C) 97.8  F (36.6  C) 97.9  F (36.6  C)   TempSrc:  Oral Oral Oral   SpO2:   99% 100%   Weight:       Height:           General: Awake, alert, NAD, sitting comfortably in  wheelchair, returning from therapy  HEENT: Helmet in place, MMM  Cardiac: RRR, no murmurs, rubs, gallops, no LE edema  Pulm: CTAB, breathing comfortably on RA  Abd: Soft, nontender, nondistended, BS+  MSK: Moving all right sided extremities spontaneously, L side flaccid  Neuro: L facial droop, speech slurred but can enunciate  Skin: No lesions/rashes appreciated on visible skin      It was our pleasure to care for Carl Sierra during this hospitalization. Please do not hesitate to contact me should there be questions regarding the hospital course or discharge plan.      Patient reviewed with attending physician, Dr. Vargas, who agrees with the assessment and plan.    Dominga Kirk MD  N PM&R PGY-2  11/21/2024  Pager #: [phone]

## 2024-11-21 NOTE — PROGRESS NOTES
XCite stim unit for Activity Based Therapy. Skilled set up; determined appropriate FES parameters for each muscle group based off of strong tetanic response of muscle test.  Used the following activities from the software library: UE and hand library  Intervention and patient response:Pt completed 50 repetitions x2 sets with grasp/release,and 15 repetitions x4 sets with forward reach,grasp, retract with writer guiding through transitional movements.

## 2024-11-21 NOTE — PROGRESS NOTES
"Team rounds today. SW followed up with [facility] admissions, prior authorization was approved. On track to discharge to [facility] tomorrow. Pt's significant other requested pt be put on the waiting list for a private room, SW passed message along to admissions.     CANDELARIO called [facility] to set up a wheelchair ride, pickup window between 10:40-11:20.    CANDELARIO met with pt and his significant other to complete IRF and provide transportation details. Pt's significant other will be here in the morning to help pack pt's belongings    [facility]  [address] [place] [address]  RN Handoff: [phone]  Fax: [phone]    [facility] Pain Assessment    Pain Effect on Sleep  \"Over the past 5 days, how much of the time has pain made it hard for you to sleep at night?\"    0. Does not apply - I have not had any pain or hurting in the past 5 days    JUS Pisano  Post Acute Float   [facility]    Phone: [phone]  Fax: [phone]    "

## 2024-11-21 NOTE — PROGRESS NOTES
[facility]   Acute Rehabilitation Unit  Daily Progress Note    INTERVAL HISTORY  No acute events overnight. Vitals stable. Nursing notes reviewed, no acute concerns. Significant other with c/f slight increased cranial swelling. Afebrile. WBC normal. No s/s infx. CTM. LBM 11/19 (incontinent). On bowel program. Increased senna to BID. Discussed patient on team rounds. Looking forward to [facility]. Patient/family ok with patient being in a double room, would like to hopefully transfer to a single room to help with his mood/mental health if possible once one is available. Patient denied any further questions/concerns at this time.      Functional  PT: 11/20  Bed Mobility: Savanna L, mod-A R rolling, Max A supine<>sit  Transfer: Liko Ax2  Gait: unable, unsafe  Stairs: unable, unsafe  Balance: Able to sit unsupported SBA to min-A. Dynamic posterior/L lean, pt works to correct and is aware of midline. Static standing with RUE support with up to modA for steadying, does best with visual feedback from mirror to maintain midline.     OT: 11/21  ADLs:  Mobility: Ax2 with emile steady. Wheelchair based.   Grooming: SBA seated  Dressing: UB Max A seated in chair. LB Max A with emile steady  Bathing: liko lift Ax2 to/from purple shower chair/commode. Assistance with washing/rinsing/drying 6/10 body parts.   Toileting: Emile steady Ax2 with toilet transfer with commode overlay. Dependent hygiene/clothing.  IADLs: PLOF IND. Recommend assist.  Vision/Cognition: Full time corrective lenses. L neglect, possible peripheral field deficits. Moderate cognitive deficits attention, recall, comprehension, problem solving.     SLP: 11/20  Hearing: WFL  Vision: glasses; significant visual impairment (baseline per patient) as well as left neglect/inattention  Communication: Mild dysarthria; speech intelligibility 80% in conversation  Cognition: Moderate cogntiive-communication impairment in  areas of attention, memory, executive function and visuospatial skills.   Swallow: Regular diet and thin liquids (0). Set up assist as needed, upright position/chair position is ideal, small bites/sips, slow rate, and alternate solids and liquids. ARU swallow eval only, pt independent with swallow strategies.    MEDICATIONS  Scheduled meds  Current Facility-Administered Medications   Medication Dose Route Frequency Provider Last Rate Last Admin    acetaminophen-caffeine (EXCEDRIN TENSION HEADACHE) 500-65 MG tablet 1 tablet  1 tablet Oral Daily Angel Vargas DO   1 tablet at 11/21/24 0800    amLODIPine (NORVASC) tablet 10 mg  10 mg Oral Daily Yessica Villalobos MD   10 mg at 11/21/24 0800    aspirin (ASA) chewable tablet 81 mg  81 mg Oral Daily Zac Hulrey MD   81 mg at 11/21/24 0800    atorvastatin (LIPITOR) tablet 40 mg  40 mg Oral QPM Zac Hurley MD   40 mg at 11/20/24 2052    baclofen (LIORESAL) tablet 10 mg  10 mg Oral BID Angel Vargas DO   10 mg at 11/21/24 0800    bisacodyl (DULCOLAX) suppository 10 mg  10 mg Rectal Daily Angel Vargas DO   10 mg at 11/20/24 1902    capsaicin (ZOSTRIX) 0.075 % cream   Topical TID Angel Vargas DO   Given at 11/21/24 0801    clopidogrel (PLAVIX) tablet 75 mg  75 mg Oral Daily Zac Hurley MD   75 mg at 11/21/24 0800    clotrimazole (LOTRIMIN) 1 % cream   Topical BID Rosio Montague MD   Given at 11/21/24 0801    enoxaparin ANTICOAGULANT (LOVENOX) injection 40 mg  40 mg Subcutaneous Q24H Zac Hurley MD   40 mg at 11/20/24 1318    insulin glargine (LANTUS PEN) injection 7 Units  7 Units Subcutaneous QAM AC Zac Hurley MD   7 Units at 11/21/24 0800    insulin glargine (LANTUS PEN) injection 7 Units  7 Units Subcutaneous At Bedtime Zac Hurley MD   7 Units at 11/20/24 2107    Lidocaine (LIDOCARE) 4 % Patch 1 patch  1 patch Transdermal Q24h Angel Vargas, DO   1 patch at 11/20/24 2052    losartan (COZAAR) tablet 50  "mg  50 mg Oral BID Yessica Villalobos MD   50 mg at 11/21/24 0759    melatonin tablet 5 mg  5 mg Oral At Bedtime Angel Vargas DO   5 mg at 11/20/24 2052    metoprolol tartrate (LOPRESSOR) tablet 50 mg  50 mg Oral BID Yessica Villalobos MD   50 mg at 11/21/24 0800    pantoprazole (PROTONIX) EC tablet 40 mg  40 mg Oral QAM AC Yessica Villalobos MD   40 mg at 11/21/24 0800    sennosides (SENOKOT) tablet 1 tablet  1 tablet Oral Daily Zac Hurley MD   1 tablet at 11/21/24 0800    thiamine (B-1) tablet 100 mg  100 mg Oral Daily Zac Hurley MD   100 mg at 11/21/24 0800       PRN meds:  Current Facility-Administered Medications   Medication Dose Route Frequency Provider Last Rate Last Admin    acetaminophen (TYLENOL) tablet 650 mg  650 mg Oral Q4H PRN Zac Hurley MD   650 mg at 11/20/24 1902    glucose gel 15-30 g  15-30 g Oral Q15 Min PRN Zac Hurley MD        Or    dextrose 50 % injection 25-50 mL  25-50 mL Intravenous Q15 Min PRN Zac Hurley MD        Or    glucagon injection 1 mg  1 mg Subcutaneous Q15 Min PRN Zac Hurley MD        diclofenac (VOLTAREN) 1 % topical gel 4 g  4 g Topical 4x Daily PRN Zac Hurley MD   4 g at 11/17/24 1540    famotidine (PEPCID) tablet 20 mg  20 mg Oral BID PRN Zac Hurley MD        menthol (Topical Analgesic) 2.5% (BENGAY VANISHING SCENT) 2.5 % topical gel   Topical 4x Daily PRN Yessica Villalobos MD   Given at 11/16/24 0842    polyethylene glycol (MIRALAX) Packet 17 g  17 g Oral Daily PRN Zac Hurley MD        QUEtiapine (SEROquel) quarter-tab 6.25 mg  6.25 mg Oral At Bedtime PRN Zac Hurley MD   6.25 mg at 11/19/24 2203         PHYSICAL EXAM  /87 (BP Location: Right arm)   Pulse 78   Temp 97.8  F (36.6  C) (Oral)   Resp 16   Ht 1.753 m (5' 9\")   Wt 97 kg (213 lb 13.5 oz)   SpO2 99%   BMI 31.58 kg/m    General: in no acute distress, conversational and alert, sitting comfortably in chair  HEENT: Helmet on. Nares clear, conjunctiva " white  Pulmonary: on room air, no acute distress.  Cardiovascular: Warm, well perfused  Abdominal: soft, non-tender to palpation, non-distended  Extremities: warm peripherally, no edema in BLEs  Skin: warm, dry, without erythema, ecchymosis.   Neuro: Conjugate gaze, left facial droop. Formal strength exam not completed today.    LABS  Results for orders placed or performed during the hospital encounter of 11/04/24 (from the past 24 hours)   Glucose by meter   Result Value Ref Range    GLUCOSE BY METER POCT 131 (H) 70 - 99 mg/dL   Glucose by meter   Result Value Ref Range    GLUCOSE BY METER POCT 126 (H) 70 - 99 mg/dL   Glucose by meter   Result Value Ref Range    GLUCOSE BY METER POCT 135 (H) 70 - 99 mg/dL   Basic metabolic panel   Result Value Ref Range    Sodium 141 135 - 145 mmol/L    Potassium 3.6 3.4 - 5.3 mmol/L    Chloride 106 98 - 107 mmol/L    Carbon Dioxide (CO2) 23 22 - 29 mmol/L    Anion Gap 12 7 - 15 mmol/L    Urea Nitrogen 6.5 6.0 - 20.0 mg/dL    Creatinine 0.70 0.67 - 1.17 mg/dL    GFR Estimate >90 >60 mL/min/1.73m2    Calcium 9.1 8.8 - 10.4 mg/dL    Glucose 108 (H) 70 - 99 mg/dL   CBC with platelets   Result Value Ref Range    WBC Count 4.8 4.0 - 11.0 10e3/uL    RBC Count 4.20 (L) 4.40 - 5.90 10e6/uL    Hemoglobin 11.2 (L) 13.3 - 17.7 g/dL    Hematocrit 33.5 (L) 40.0 - 53.0 %    MCV 80 78 - 100 fL    MCH 26.7 26.5 - 33.0 pg    MCHC 33.4 31.5 - 36.5 g/dL    RDW 16.6 (H) 10.0 - 15.0 %    Platelet Count 217 150 - 450 10e3/uL   Glucose by meter   Result Value Ref Range    GLUCOSE BY METER POCT 116 (H) 70 - 99 mg/dL       ASSESSMENT AND PLAN  Carl Sierra is a 49 year old left hand dominant male with past medical history of HTN and undiagnosed GLENN, who initially presented to October 2 with hypertensive emergency, left upper extremity weakness and numbness due to right MCA ischemic stroke. He underwent right craniectomy on October 5 for the management of cerebral edema and midline shift. Hospital course was  complicated but he clinically improved and was transferred to ARU.     ===========================================================    Updates Today:  - Increased senna to BID   - Confirmed [facility] 11/22    ============================================================    Admission to acute inpatient rehab: 11/4/2024   Impairment group code: 01.1 Stroke      PT, OT and SLP 60 minutes of each on a daily basis, 6x a week, for 25 days,  in addition to rehab nursing and close management of physiatrist.     Impairment of ADL's:  OT for 60 min daily, 6x a week for 25 days, to work on upper and lower body self care, dressing, toileting, bathing, energy conservation techniques with use of ADs as needed   Impairment of mobility:   PT for 60 min daily, 6x a week for 25 days, to work on strengthening, endurance buildup, transfers and most likely WC mobility.   Impairment of cognition/language/swallow:   SLP for 60 min daily, 6x a week  for cognitive evaluation and treatment strategies for higher level cognitive deficits and memory impairment   Rehab RN to administer medication, patient education on medication taking, VS monitoring, bowel regimen, glucose monitoring and wound care/surgical wound dressing changes and monitoring.      Medical Conditions  # Right MCA ischemic stroke s/p TNK (10/2/2024)  # Cerebral edema s/p decompressive craniectomy (10/5/24)  # Left Sided Hemiparesis, Flaccid  # Spasticity  Patient presented to [facility] on 10/2 with hypertensive emergency, left upper extremity numbness and weakness.  Stroke code was initiated on arrival and CTA head/neck revealed short segment severe stenosis of the posterior branch of the M2 segment of the right MCA. Decision was made to give tenecteplase.  This did not significantly improve his symptoms.  Brain MRI additionally revealed small right posterior cerebellar stroke and multiple infarcts. Completed 7 day course of keppra. Patient had  left sided flaccid hemiplegia on admission, with some return of strength since that time. He has developed some increased tone in the LUE/LLE.   - ASA and plavix  - Acetaminophen-caffeine (Excedrin Tension Headache) 500-65 mg tablet daily (was started on 10/25) . Could consider modafinil if fatigue is a persistent issue.   - stroke secondary prevention as below  - helmet when OOB   - F/up Neuro (6 weeks post discharge), PM&R (1-2 months post discharge), neurosurgery ([facility]) for bone flap replacement after discharge.   - Baclofen 10 mg BID 11/14      # Headaches  # Neck pain   # L knee pain   Headaches controlled w tylenol PRN, excedrin. Typically starts towards the end of the day and also responded to lying flat and hydration. Pain seems myofascial vs AC joint related, no clinical s/s of radiculopathy. L knee pain x20 years d/t sports injury + likely early OA.   - Discontinued robaxin to avoid concurrent dosing with baclofen.    - tylenol 650 mg Q4H PRN   - added topical bengay and diclofenac cream   - Gabapentin 300 mg at bedtime for headaches  - can consider TENS for the myofascial pain   - Capsaicin cream prn 11/18, scheduled 11/20 TID    # HTN  Not managed PTA. Intermittent HTN while at ARU.   - metoprolol 50 bid   - losartan 50 bid (was bid for better control of morning HTN, can be changed to daily dosing)  - amlodipine 10 daily      # HLD  LDL 98 on 10/3.   - continue lipitor 40mg QD  - repeat lipid panel in 3 months      # T2DM  HgbA1c 9.2 10/3.  - DM education   - Lantus 7 units bid, SSI. Could consider transition to oral agent.   - BG check QID  - f/up PCP     # NICM/HFrEF  TTE performed at OSH. EF 40 to 45% with global hypokinesis but no intracardiac shunt   - repeat TTE and cardiology referral (if outpatient) around 12/5.      # Possible undiagnosed GLENN   Family reported episodes of apnea during the night. Has not needed nocturnal O2 while in ARU.   - continue prn O2  - Sleep study as  outpatient        # Normocytic Anemia   Mild and stable, likely due to chronic disease vs CHAD.   - qMon/Thurs CBC     # Insomnia   - continue melatonin   - Seroquel 6.25 mg PO HS PRN      # Depression  # Adjustment to disability  Worsening mood in the setting of his stroke and disability.   - rehab psych consult   - spiritual health consult   - can consider SSR I vs SNRI      # Neurogenic bowel  - Miralax one packet dailyPRN   - Senna 1 tab qAM, increased to BID 11/21  - Bisacodyl 10mg suppository QD     # Neurogenic bladder:   - ok to use primofit  - Urinal during the day, possibly toileting using the commode pending his course       FEN: regular with thin. Continued B1 supplement.   DVT Prophylaxis: DAPT, lovenox 40 mg QD  GI Prophylaxis: Protonix 40 mg QD.  Code: Full - confirmed upon admission  Disposition: he will be WC based and will require assistance with all ADLs and mobility; home is not accessible. Will need as ramp and possibly chair lift for stairs.   ELOS/Discharge Date:  11/22, [facility]  Rehab prognosis:  Good   Follow up Appointments on Discharge:   PCP in 1-2 weeks, needs sleep study  PM&R in 1-2 months.  Stroke neurology (6-8 weeks post discharge)  Neurosurgery ([facility])  Heart Failure Clinic (With TTE ordered prior)   Psychology     Patient reviewed with attending physician, Dr. Vargas, who agrees with the assessment and plan.    Dominga Kirk MD  [facility] PM&R PGY-2  11/21/2024  Pager #: [phone]

## 2024-11-21 NOTE — PLAN OF CARE
Discharge Planner Post-Acute Rehab SLP:      Discharge Plan: TCU. Ongoing SLP     Precautions: fall, crani/helmet, impulsivity     Current Status:  Hearing: WFL  Vision: glasses; significant visual impairment (baseline per patient) as well as left neglect/inattention  Communication: Mild dysarthria; speech intelligibility 80% in conversation  Cognition: Moderate cogntiive-communication impairment in areas of attention, memory, executive function and visuospatial skills.   Swallow: Regular diet and thin liquids (0). Set up assist as needed, upright position/chair position is ideal, small bites/sips, slow rate, and alternate solids and liquids. ARU swallow eval only, pt independent with swallow strategies.     Assessment: Patient participated in completion of moderate level deductive reasoning task with use of red line anchor. Patient completed with 70% accuracy given min cues including cues to scan left. Patient participated in visual scanning and time calculation task using chart and red line anchor. Patient completed calculations with 80% accuracy independently. Patient noted to correct self when words missed at times due to left inattention/neglect.        Other Barriers to Discharge (Family Training, etc): none if discharging to TCU.

## 2024-11-21 NOTE — PLAN OF CARE
Physical Therapy Discharge Summary    Reason for therapy discharge:    Discharged to transitional care facility.    Progress towards therapy goal(s). See goals on Care Plan in [facility] electronic health record for goal details.  Goals partially met.  Barriers to achieving goals:   discharge from facility.    Therapy recommendation(s):    Continued therapy is recommended.  Rationale/Recommendations:  recommend TCU for ongoing rehab.    Bed Mobility: modA  Transfer: serasteady Ax2 nsg, Ax1 therapy  Gait: unable, unsafe  Stairs: unable, unsafe  Balance: seated balance requires CGA-Savanna, do not leave alone on toilet or EOB due to L/posterior lean     Outcome Measures:   PASS               11/5: 0/36 11/21: 8/36  Rice              11/5: 0/56

## 2024-11-22 ENCOUNTER — TELEPHONE (OUTPATIENT)
Dept: CARDIOLOGY | Facility: CLINIC | Age: 49
End: 2024-11-22

## 2024-11-22 ENCOUNTER — APPOINTMENT (OUTPATIENT)
Dept: OCCUPATIONAL THERAPY | Facility: CLINIC | Age: 49
DRG: 057 | End: 2024-11-22
Attending: PHYSICAL MEDICINE & REHABILITATION
Payer: COMMERCIAL

## 2024-11-22 VITALS
HEIGHT: 69 IN | DIASTOLIC BLOOD PRESSURE: 87 MMHG | WEIGHT: 213.85 LBS | TEMPERATURE: 97.8 F | RESPIRATION RATE: 16 BRPM | HEART RATE: 70 BPM | SYSTOLIC BLOOD PRESSURE: 138 MMHG | OXYGEN SATURATION: 100 % | BODY MASS INDEX: 31.67 KG/M2

## 2024-11-22 LAB — GLUCOSE BLDC GLUCOMTR-MCNC: 100 MG/DL (ref 70–99)

## 2024-11-22 PROCEDURE — 250N000013 HC RX MED GY IP 250 OP 250 PS 637: Performed by: PHYSICAL MEDICINE & REHABILITATION

## 2024-11-22 PROCEDURE — 99239 HOSP IP/OBS DSCHRG MGMT >30: CPT | Mod: GC | Performed by: PHYSICAL MEDICINE & REHABILITATION

## 2024-11-22 PROCEDURE — 97535 SELF CARE MNGMENT TRAINING: CPT | Mod: GO

## 2024-11-22 PROCEDURE — 250N000013 HC RX MED GY IP 250 OP 250 PS 637

## 2024-11-22 RX ADMIN — LOSARTAN POTASSIUM 50 MG: 50 TABLET, FILM COATED ORAL at 09:32

## 2024-11-22 RX ADMIN — BACLOFEN 10 MG: 10 TABLET ORAL at 09:32

## 2024-11-22 RX ADMIN — PANTOPRAZOLE SODIUM 40 MG: 40 TABLET, DELAYED RELEASE ORAL at 09:31

## 2024-11-22 RX ADMIN — ACETAMINOPHEN, CAFFEINE 1 TABLET: 500; 65 TABLET, FILM COATED ORAL at 09:31

## 2024-11-22 RX ADMIN — THIAMINE HCL TAB 100 MG 100 MG: 100 TAB at 09:32

## 2024-11-22 RX ADMIN — CLOPIDOGREL BISULFATE 75 MG: 75 TABLET ORAL at 09:28

## 2024-11-22 RX ADMIN — CAPSAICIN: 0.75 CREAM TOPICAL at 09:33

## 2024-11-22 RX ADMIN — AMLODIPINE BESYLATE 10 MG: 10 TABLET ORAL at 09:28

## 2024-11-22 RX ADMIN — CLOTRIMAZOLE: 1 CREAM TOPICAL at 09:34

## 2024-11-22 RX ADMIN — ASPIRIN 81 MG CHEWABLE TABLET 81 MG: 81 TABLET CHEWABLE at 09:31

## 2024-11-22 RX ADMIN — METOPROLOL TARTRATE 50 MG: 50 TABLET, FILM COATED ORAL at 09:31

## 2024-11-22 RX ADMIN — SENNOSIDES 1 TABLET: 8.6 TABLET, FILM COATED ORAL at 09:28

## 2024-11-22 RX ADMIN — INSULIN GLARGINE 7 UNITS: 100 INJECTION, SOLUTION SUBCUTANEOUS at 09:34

## 2024-11-22 ASSESSMENT — ACTIVITIES OF DAILY LIVING (ADL)
ADLS_ACUITY_SCORE: 0

## 2024-11-22 NOTE — TELEPHONE ENCOUNTER
[facility] Call Center    Phone Message    May a detailed message be left on voicemail: yes     Reason for Call: Appointment Intake    Referring Provider Name: Dominga Fernandez MD   Diagnosis and/or Symptoms: Acute ischemic right MCA stroke (H) [I63.511]     NEW HF. Please assist patient.    Action Taken: Message routed to:  [facility] ([facility] Cardio    Travel Screening: Not Applicable     Date of Service:

## 2024-11-22 NOTE — PLAN OF CARE
Goal Outcome Evaluation:    Overall Patient Progress: no change    Pt is alert and oriented. Primofit on NOC. LBM 11/21. Ax2 emile manzano. Denied pain, SOB, CP, and n/t, Call light within reach and bed alarm on. Pt is able to make needs known. Planning for discharge 11/22. Will continue with POC.

## 2024-11-22 NOTE — PROGRESS NOTES
Goal Outcome Evaluation:  [id number]    Pt complained of pain at the right shoulder, scheduled capsaicin was applied and baclofen was also given. He refused the lidocaine patch, he claimed that it's not helping. Will continue to monitor.

## 2024-11-22 NOTE — PLAN OF CARE
Occupational Therapy Discharge Summary    Reason for therapy discharge:    Discharged to transitional care facility.    Progress towards therapy goal(s). See goals on Care Plan in [facility] electronic health record for goal details.  Goals partially met.  Barriers to achieving goals:   discharge from facility.    Therapy recommendation(s):    Continued therapy is recommended.  Rationale/Recommendations:  recommend continued OT at TCU to maximize IND and safety with ADLs and functional transfers.    ADLs:  Mobility: Ax2 with emile steady. Wheelchair based.   Grooming: SBA seated  Dressing: UB Max A seated in chair. LB Max A with emile steady  Bathing: liko lift Ax2 to/from purple shower chair/commode. Assistance with washing/rinsing/drying 6/10 body parts.   Toileting: Emile steady Ax2 with toilet transfer with commode overlay. Dependent hygiene/clothing.  IADLs: PLOF IND. Recommend assist.  Vision/Cognition: Full time corrective lenses. L neglect, possible peripheral field deficits. Moderate cognitive deficits attention, recall, comprehension, problem solving.

## 2024-11-22 NOTE — PLAN OF CARE
Goal Outcome Evaluation:    Patient AxO, able to make needs known. VSS, pain managed by scheduled meds. On regular texture, thin liquid diet. Able to take his meds whole with thin liquids. A2 with the emile manzano. Continent of bowel and bladder, able to use the urinal and the bathroom. No BM reported this shift. Had team rounds this AM. For possible discharge tomorrow to [facility]. Participated in therapies. Continue with POC.

## 2024-11-22 NOTE — PROGRESS NOTES
Speech Language Therapy Discharge Summary    Reason for therapy discharge:    Discharged to transitional care facility.    Progress towards therapy goal(s). See goals on Care Plan in [facility] electronic health record for goal details.  Goals partially met.  Barriers to achieving goals:   discharge from facility.    Therapy recommendation(s):    Continued therapy is recommended.  Rationale/Recommendations:  Ongoing SLP for cognition.    Status 11/21:  Hearing: WFL  Vision: glasses; significant visual impairment (baseline per patient) as well as left neglect/inattention  Communication: Mild dysarthria; speech intelligibility 80% in conversation  Cognition: Moderate cogntiive-communication impairment in areas of attention, memory, executive function and visuospatial skills.   Swallow: Regular diet and thin liquids (0). Set up assist as needed, upright position/chair position is ideal, small bites/sips, slow rate, and alternate solids and liquids. ARU SLP swallow eval only, pt independent with swallow strategies      CLQT 11/05:  Cognitive Domain                   Severity Rating/Score  Attention                                             Severe/46  Memory                                               Moderate/137  Executive Functions                            Severe/5  Language                                           WNL/29  Visuospatial Skills                               Severe/20  Composite Severity Rating                 Moderate/1.8  Clock Drawing Severity Rating           Mild/11

## 2024-11-22 NOTE — PROGRESS NOTES
PSYCHOLOGY PROGRESS NOTE    Start time: 1315  Stop Time:  1334  Total Duration in Minutes: 19 minutes    SUBJECTIVE:  Danii Sierra was seen today for a follow-up visit.  We discussed things that were covered in previous sessions.  Further psychoeducation was provided on the projected recovery timeline for stroke/TBI, which includes 1-2 years.  These was really disheartening to Danii, as he is eager to return to work.  He stated he is confused because he was able to write his name immediately following the injury, but feels he is regressing since his initial admission.     We breifly touched on some emotions he may be feeling, including anger and sadness and frustration.  He stated that he tends to rely on other people, such as his partner, Yue, and a brother for emotional support. We discussed it will be important to reach out to them when he is experiencing some difficulties.     He has been admitted to [facility] - which will be a good fit for him going forward.  We discussed how he may be motivated for good continence cares so he can engage in pool therapy.     Symptoms reported: frustration, anger, sadness.      Progress toward goals: The things Danii is depressed or anxious about are really dependent on biology - and so working towards acceptance will be paramount.     Interventions utilized: validation, education, rapport building.     OBJECTIVE: Danii in his wheelchair.  Spontaneous movements were noted in his upper extremities. Some dysarthria was noted, and he responded well to asking him to repeat himself for my comprehension. He appeared to follow conversation without significant difficulty. Thoughts were goal-directed and linear. No SI, HI, dahlia, or confusion noted. There was limited spontaneous conversation, and danii was dependent on me for topic maintenance and generation.     ASSESSMENT: Danii appears to be demonstrating relatively expected emotions following a major medical event. He does endorse some  "anxiety and feeling overwhelmed when looking at the \"big picture\" however, he responds well to cognitive re frames. I am a bit concerned that he will attempt to return to work too quickly, and potentially more education about this will need to be provided.  Additionally, I am a bit concerned about his mood - however, his participation in these discussions appears to be limited.       DIAGNOSIS:  Adjustment disorder with anxious mood  History of depression    PLAN: Discharge pending bed availability at [facility]. No further follow-up indicated at this time. I am available to the family if further assistance is required.     Carl may benefit from an single room for mental health purposes while at [facility].  He may also benefit from ongoing mental health services including meeting with a psychologist regularly.     Susan Elias PsyD, LP  Clinical Neuropsychologist      "

## 2024-11-23 ENCOUNTER — PATIENT OUTREACH (OUTPATIENT)
Dept: CARE COORDINATION | Facility: CLINIC | Age: 49
End: 2024-11-23
Payer: COMMERCIAL

## 2024-11-23 NOTE — PROGRESS NOTES
Connected Care Resource Center: Connected [facility] [place]    Background: Transitional Care Management program identified per system criteria and reviewed by Connected Care Resource Center team for possible outreach.    Assessment: Upon chart review, CCRC Team member will not proceed with patient outreach related to this episode of Transitional Care Management program due to reason below:    Non-[facility] TCU: CCRC team member noted patient discharged to TCU/ARU/LTACH. Patient is not established with a [facility] currently supported by Primary Care-Care Coordination therefore handoff to Primary Care-Care Coordination is not appropriate at this time.    Plan: Transitional Care Management episode addressed appropriately per reason noted above.      Sana Ledesma MA  [facility] [place], [facility]    *Connected Care Resource Team does NOT follow patient ongoing. Referrals are identified based on internal discharge reports and the outreach is to ensure patient has an understanding of their discharge instructions.

## 2024-11-25 ENCOUNTER — TELEPHONE (OUTPATIENT)
Dept: BEHAVIORAL HEALTH | Facility: CLINIC | Age: 49
End: 2024-11-25
Payer: COMMERCIAL

## 2024-11-25 NOTE — TELEPHONE ENCOUNTER
Writer spoke with pt on que and scheduled TC therapy appointment for [id number] order on 12/05/2024 @ 3:00 pm.    Marlene Chan  11/25/2024  1041

## 2024-11-25 NOTE — TELEPHONE ENCOUNTER
----- Message from Marlene BARNETT sent at 11/22/2024  8:30 AM CST -----  MRN: [id number]- Assess/Evaluate, will pass to  for initial. Romel 11/22/2024

## 2024-11-25 NOTE — TELEPHONE ENCOUNTER
First attempt to contact pt. Marlonr left a VM with TC contact info and encouraged a phone call back to schedule TC appointment for 9035 order. Marlonr will postpone for tomorrow.    Marlene Chan  11/25/2024  [phone]

## 2024-11-25 NOTE — TELEPHONE ENCOUNTER
----- Message from Marlene BARNETT sent at 11/22/2024  8:30 AM CST -----  MRN: [id number]- Assess/Evaluate, will pass to  for initial. Romel 11/22/2024

## 2024-11-26 NOTE — TELEPHONE ENCOUNTER
Referred by Dominga Fernandez MD [facility]. Ischemic cardiomyopathy (R) MCA stroke. EF 40-45%. Had echo in Oct 2024    Plan:  Next available Jan.     Attempts to Contact:   December 4, 2024 - left voicemail   December 12, 2024 - left voicemail       Patient contacted and appointment made with  on 1/17/25 with labs prior.

## 2024-12-02 ENCOUNTER — TELEPHONE (OUTPATIENT)
Dept: BEHAVIORAL HEALTH | Facility: CLINIC | Age: 49
End: 2024-12-02
Payer: COMMERCIAL

## 2024-12-02 NOTE — TELEPHONE ENCOUNTER
Writer spoke with pt and moved 12/05/2024 @ 3:00 pm appointment to 12/05/2024 @ 1:00 pm. Pt will call back to confirm again if does not work.    Marlene Chan  12/02/2024  [phone]

## 2024-12-03 ENCOUNTER — TELEPHONE (OUTPATIENT)
Dept: BEHAVIORAL HEALTH | Facility: CLINIC | Age: 49
End: 2024-12-03
Payer: COMMERCIAL

## 2024-12-03 NOTE — TELEPHONE ENCOUNTER
Patient called TC to review upcoming scheduled appt. Stated that time and day works and confirmed to send text link to pt for appt.     Wendy Carpenter  Transition Clinic Coordinator  12/03/24 9:40 AM

## 2024-12-04 ENCOUNTER — TELEPHONE (OUTPATIENT)
Dept: BEHAVIORAL HEALTH | Facility: CLINIC | Age: 49
End: 2024-12-04
Payer: COMMERCIAL

## 2024-12-04 NOTE — TELEPHONE ENCOUNTER
Pt reminder call for 12/05 TC appointment. VM left with TC info.     Marlene Chan  12/04/2024  [phone]

## 2024-12-05 ENCOUNTER — TELEPHONE (OUTPATIENT)
Dept: BEHAVIORAL HEALTH | Facility: CLINIC | Age: 49
End: 2024-12-05
Payer: COMMERCIAL

## 2024-12-05 NOTE — TELEPHONE ENCOUNTER
Call to patient regarding virtual visit today.  States he could not get connected and is currently at rehab facility.   Patient requests call in a few days to reschedule TC appt.   Earline Jama Psy.D, LP

## 2024-12-09 ENCOUNTER — TELEPHONE (OUTPATIENT)
Dept: BEHAVIORAL HEALTH | Facility: CLINIC | Age: 49
End: 2024-12-09
Payer: COMMERCIAL

## 2024-12-09 NOTE — TELEPHONE ENCOUNTER
----- Message from Marlene BARNETT sent at 12/5/2024  1:39 PM CST -----  can you reach out to : [phone] in a few days to see if he would like to r/s ? He is in at a rehab facility currently.    Also, can you cancel on your end, I don't want it to appear as if he no showed.   thanks

## 2024-12-09 NOTE — TELEPHONE ENCOUNTER
First attempt to contact pt. Marlonr left a VM with TC contact info and encouraged a phone call back to schedule return therapy TC appointment. Marlonr will postpone for tomorrow    Marlene Chan  12/09/2024  [phone]

## 2024-12-10 ENCOUNTER — TELEPHONE (OUTPATIENT)
Dept: BEHAVIORAL HEALTH | Facility: CLINIC | Age: 49
End: 2024-12-10
Payer: COMMERCIAL

## 2024-12-10 NOTE — TELEPHONE ENCOUNTER
Second attempt to reach to reschedule initial 9035 assess/evaluate appointment. LVM asking for call back to TC to reschedule.    Wendy Carpenter  Transition Clinic Coordinator  12/10/24 8:05 AM

## 2024-12-20 ENCOUNTER — TELEPHONE (OUTPATIENT)
Dept: PHYSICAL MEDICINE AND REHAB | Facility: CLINIC | Age: 49
End: 2024-12-20
Payer: COMMERCIAL

## 2024-12-20 DIAGNOSIS — Z98.890 STATUS POST CRANIECTOMY: Primary | ICD-10-CM

## 2024-12-20 NOTE — TELEPHONE ENCOUNTER
[facility] Call Center    Phone Message    May a detailed message be left on voicemail: yes     Reason for Call: Other: Frances calling stating an order needs to be put in for him to have a CT done at [facility] to see if the swelling has gone down enough to put his bone flap back on.  Please call her back to advise.      Action Taken: Message routed to:  [facility] ([facility]): PMR    Travel Screening: Not Applicable     Date of Service:

## 2024-12-23 NOTE — TELEPHONE ENCOUNTER
Cranioplasty in October 2024 at [facility] in [place] post stroke - typically surgeon would handle all f/u care including planning for flap replacement future surgery (including needed imaging)    Writer called to RN care coordinator Frances Loya of [facility] transitional Care Program in [place].    She has already done the foot work to get pt set up for flap closure.    The surgeons are [facility] and the surgery is already prior authorized by insurance to be done at [facility].    The surgeons could not get authorization for the CT, however, and insurance wants the PCP to give the order for CT.    Apparently Dr Vargas agreed to be pt's PCP and is now listed as PCP who has authorizing capacity for certain things, per insurance's guidelines.    ACTION:    Request to Dr Vargas for head CT to evaluate edema of brain for decision for bone flap closure, needs to be done at [facility].    Kirsten Mijares RN on 12/23/2024 at 9:57 AM    Routing message:  You agreed to be pt's PCP and his insurance requires that you write order for head CT to be done at [facility] for the purpose of Neurosurgeon needs to evaluate edema for decision to replace bone flap.  Please write order to outside imaging and I will coordinate with [facility] to have the TRP send him for the imaging.    Kirsten Mijares RN on 12/23/2024 at 10:00 AM

## 2024-12-24 NOTE — TELEPHONE ENCOUNTER
's order for head CT entered today (to be completed as outpatient at [facility], pt is currently admitted to [facility] in [place])    Left message to [facility], Frances Loya RN to get fax number to the unit Carl is admitted to.    Called to [facility] to get Imaging CT contacts:    CT phone # [phone]  CT dept. Fax # [phone]      Kirsten Mijares RN on 12/24/2024 at 10:45 AM

## 2024-12-30 ENCOUNTER — TELEPHONE (OUTPATIENT)
Dept: PHYSICAL MEDICINE AND REHAB | Facility: CLINIC | Age: 49
End: 2024-12-30
Payer: COMMERCIAL

## 2024-12-30 NOTE — TELEPHONE ENCOUNTER
Needed data to complete calls this afternoon:    [employer] PROVIDER LINE: [phone]        [facility] taX ID: [id number]  NPI: [id number]    CPT for head CT w/o contrast 74853  Ordering provider: Wali Knight MD  NPI [id number]    Dr Vargas is listed on [employer] account as PCP & must approve referrals out of network  We need to assign a referral to [facility]    Pt is currently an inpatient in TCU: [facility] (in [place])    Our TAX ID: [facility]  [id number]    Alicia NPI  [id number]  Angel Vargas       SITUATION:  2:02 pm today, call from Neurosurgeon/ [facility] coordinator, Maame ( [phone]) (this is office who has ordered head CT in preparation for the planned cranial bone flap replacement for Mr. Sierra)    Maame explained that her office did not need order from Dr Vargas for head CT (the surgeon did already order this) (see call of 12/20 from Yaneth [facility] RN coordinator).    What they DO NEED is a referral from Dr Vargas as the pt's PCP to go out of network to [facility] for anything and everything since that is out of network for this pt    BACKGROUND:  DOI: 10/2024 presented to ED & treated at [facility]   right MCA occlusion and NORMA.   progression of symptoms to left hemiplegia due to vasogenic cerebral edema requiring decompressive craniotomy on 10/5   newly diagnosed presumed NICM/HFrEF (EF 40 to 45% with global hypokinesis) but no intracardiac shunt, newly diagnosed diabetes     Transferred to [facility] 11/4/24 - needs to go to TCU related to lack of progress in therapies and is not able to be discharged to the community    [facility] (TCU status) transferred to [facility] on 11/22/24    Call from [facility] RN coordinator on Dec 20 to Dr Vargas because he is listed as opt's PCP and will have to authorize imaging to be done at [facility].    ASSESSMENT  / ACTION:  Writer contacted [employer] today and was disconnected 2 x and transferred 3 x, ended up that referral as such cannot be done over the phone, they gave me website and I located the required form, completed and faxed to [facility] Referral Department at 3 pm    Pt's name is Carl Sierra III, which writer updated in this record this afternoon, his name did not have suffix up until today in [place] registration (compared to [facility] and [facility]) so this also delayed getting verification with [facility] staff because the suffix is on his record there even though writer verified , address, phone.    REQUEST / RECOMMENDATION:  Writer spent several phone calls, texts and web searches to get the [facility] Direct Care Referral form sent in (faxed at 3 pm today) and spoke back to Maame at surgeon's office at 3:19 pm to update her to have [facility] check in the morning (per [employer]'s direction)  to see if the head CT can be done at Imaging at the [facility].    Kirsten Mijares RN on 2024 at 3:59 PM      4:08 pm Maame called back to  to report that [facility] states they did not receive the form I faxed an hour ago and pt will be cancelled for the head CT.    Kirsten Mijares RN on 2024 at 4:13 PM

## 2025-01-07 NOTE — CONFIDENTIAL NOTE
Writer faxed  Referral Department patient Referral Care Direction Form   Fax: [phone]   Provided call back number to confirm receipt of fax.   Scanned form to HIM.     Dianne Miller LPN   Neurosurgery

## 2025-01-09 ENCOUNTER — TELEPHONE (OUTPATIENT)
Dept: PHYSICAL MEDICINE AND REHAB | Facility: CLINIC | Age: 50
End: 2025-01-09
Payer: COMMERCIAL

## 2025-01-09 NOTE — TELEPHONE ENCOUNTER
Head CT booked for Fred 15 at 12:30  To be done at:   [email or web address] Location: [address] [address]     Purpose of this imaging: Neurosurgeon needs evaluation of cerebral edema prior to final planning for cranioplasty  See inpatient  & ICU records at [facility] October 2, 2024    Ordering provider is [facility]  Surgeon / ordering provider is Wali Knight MD  NPI [id number]  Julio Isabel at that office sent orders & instructions  Contact info: Maame ( [phone])   - per above office, CT does not require PA due to [facility] is within [employer] insurance network      Pt residing at [facility] in [place]    RN coordinator contact is Frances Loya RN regarding transportation or medical needs :  [phone]     Pt has not been seen by Dr Vargas since ARU discharge on 11/22/24    Kirsten Mijares, RN on 1/9/2025 at 3:03 PM

## 2025-01-14 DIAGNOSIS — I50.22 CHRONIC SYSTOLIC HEART FAILURE (H): Primary | ICD-10-CM

## 2025-01-15 ENCOUNTER — HOSPITAL ENCOUNTER (OUTPATIENT)
Dept: CT IMAGING | Facility: CLINIC | Age: 50
Discharge: HOME | End: 2025-01-15
Attending: NURSE PRACTITIONER
Payer: COMMERCIAL

## 2025-01-15 DIAGNOSIS — Z98.890 S/P CRANIOTOMY: ICD-10-CM

## 2025-01-15 LAB — RADIOLOGIST FLAGS: ABNORMAL

## 2025-01-15 PROCEDURE — 70450 CT HEAD/BRAIN W/O DYE: CPT

## 2025-01-15 PROCEDURE — 70450 CT HEAD/BRAIN W/O DYE: CPT | Mod: 26 | Performed by: RADIOLOGY

## 2025-01-17 ENCOUNTER — LAB (OUTPATIENT)
Dept: LAB | Facility: CLINIC | Age: 50
End: 2025-01-17
Payer: COMMERCIAL

## 2025-01-17 DIAGNOSIS — I50.22 CHRONIC SYSTOLIC HEART FAILURE (H): ICD-10-CM

## 2025-01-17 LAB
ALBUMIN SERPL BCG-MCNC: 4 G/DL (ref 3.5–5.2)
ALP SERPL-CCNC: 68 U/L (ref 40–150)
ALT SERPL W P-5'-P-CCNC: 16 U/L (ref 0–70)
ANION GAP SERPL CALCULATED.3IONS-SCNC: 9 MMOL/L (ref 7–15)
AST SERPL W P-5'-P-CCNC: 14 U/L (ref 0–45)
BILIRUB SERPL-MCNC: 0.9 MG/DL
BUN SERPL-MCNC: 8.6 MG/DL (ref 6–20)
CALCIUM SERPL-MCNC: 9.2 MG/DL (ref 8.8–10.4)
CHLORIDE SERPL-SCNC: 105 MMOL/L (ref 98–107)
CREAT SERPL-MCNC: 0.68 MG/DL (ref 0.67–1.17)
EGFRCR SERPLBLD CKD-EPI 2021: >90 ML/MIN/1.73M2
GLUCOSE SERPL-MCNC: 109 MG/DL (ref 70–99)
HCO3 SERPL-SCNC: 24 MMOL/L (ref 22–29)
POTASSIUM SERPL-SCNC: 3.8 MMOL/L (ref 3.4–5.3)
PROT SERPL-MCNC: 6.8 G/DL (ref 6.4–8.3)
SODIUM SERPL-SCNC: 138 MMOL/L (ref 135–145)

## 2025-01-17 PROCEDURE — 80053 COMPREHEN METABOLIC PANEL: CPT | Performed by: PATHOLOGY

## 2025-01-17 PROCEDURE — 36415 COLL VENOUS BLD VENIPUNCTURE: CPT | Performed by: PATHOLOGY

## 2025-01-28 ENCOUNTER — TELEPHONE (OUTPATIENT)
Dept: FAMILY MEDICINE | Facility: OTHER | Age: 50
End: 2025-01-28

## 2025-01-28 NOTE — TELEPHONE ENCOUNTER
Per Nursing home visit notes on 1/27:   RECOMMENDATIONS:   Carl Sierra III is medically cleared to proceed with surgery with general anesthesia  Hold Blood thinning agents x 7 days: Started on 1/22  Other Medication orders: Ok for meds on morning of surgery with small sips of water.    Other pre op instructions per surgery scheduling     Ruel Bhardwaj, APRN-CNP  1/27/2025  1:34 PM

## 2025-01-28 NOTE — TELEPHONE ENCOUNTER
Please call patient ASAP.  He has a preop today for his surgery this afternoon.  [place] if we can go through his medication list and verify what he is taking and verify that his specialists told him which ones to stop and continue especially when it comes to anything that is a diuretic or a blood thinner.     Josafat Marin PA-C

## 2025-01-28 NOTE — TELEPHONE ENCOUNTER
RN did call and speak with patient, patient's nurse, charge nurse and patient's mother. Patient is currently a patient at [facility]. His medications are being managed by nursing staff there.  Charge nurse said ASA and Plavix have been on hold since 1/22.  He did receive this morning per orders below:   Baclofen  Vitamin D  Entresto  Senna  Excedrin  Metformin  Protonix  Spironolactone  Thiamine    Efren Frederick RN on 1/28/2025 at 7:31 AM

## 2025-01-28 NOTE — TELEPHONE ENCOUNTER
Spoke with surgery center, they have pre-op from yesterday and don't need additional pre-op from clinic today. Attempted to call patient, but he is in clinic, huddled with provider and updated her. She will update patient.     Sandra Gilliland, RN, BSN

## 2025-01-28 NOTE — TELEPHONE ENCOUNTER
RN attempted to call [facility] to talk with charge nurse again. Unable to find number to talk to [facility] yet.     RN the did also try to call the surgeons office for the provider doing procedure today to see if they need an additional preop appointment.  No answer, had to leave a message for nursing staff to call us back as soon as possible as patient's appointment is at 9:20am.     Only other option is to maybe call patient's number again to see if he can get us in contact with the charge nurse again.